# Patient Record
Sex: MALE | Race: WHITE | Employment: OTHER | URBAN - METROPOLITAN AREA
[De-identification: names, ages, dates, MRNs, and addresses within clinical notes are randomized per-mention and may not be internally consistent; named-entity substitution may affect disease eponyms.]

---

## 2019-03-14 ENCOUNTER — HOSPITAL ENCOUNTER (OUTPATIENT)
Age: 74
Discharge: HOME | End: 2019-03-16
Payer: MEDICARE

## 2019-03-14 PROCEDURE — 87088 URINE BACTERIA CULTURE: CPT

## 2019-03-16 LAB — URINE CULTURE, ROUTINE: NORMAL

## 2019-11-05 ENCOUNTER — HOSPITAL ENCOUNTER (OUTPATIENT)
Age: 74
Discharge: HOME | End: 2019-11-07
Payer: MEDICARE

## 2019-11-05 LAB — PROSTATE SPECIFIC ANTIGEN: 1.42 NG/ML (ref 0–4)

## 2019-11-05 PROCEDURE — 84153 ASSAY OF PSA TOTAL: CPT

## 2022-03-25 ENCOUNTER — APPOINTMENT (OUTPATIENT)
Dept: ULTRASOUND IMAGING | Age: 77
End: 2022-03-25
Payer: MEDICARE

## 2022-03-25 ENCOUNTER — APPOINTMENT (OUTPATIENT)
Dept: GENERAL RADIOLOGY | Age: 77
End: 2022-03-25
Payer: MEDICARE

## 2022-03-25 ENCOUNTER — HOSPITAL ENCOUNTER (EMERGENCY)
Age: 77
Discharge: HOME | End: 2022-03-25
Attending: EMERGENCY MEDICINE
Payer: MEDICARE

## 2022-03-25 VITALS
BODY MASS INDEX: 31.83 KG/M2 | TEMPERATURE: 98.3 F | OXYGEN SATURATION: 95 % | SYSTOLIC BLOOD PRESSURE: 104 MMHG | WEIGHT: 235 LBS | DIASTOLIC BLOOD PRESSURE: 84 MMHG | RESPIRATION RATE: 14 BRPM | HEART RATE: 102 BPM | HEIGHT: 72 IN

## 2022-03-25 DIAGNOSIS — I48.91 ATRIAL FIBRILLATION WITH RVR (HCC): ICD-10-CM

## 2022-03-25 DIAGNOSIS — I48.91 NEW ONSET ATRIAL FIBRILLATION (HCC): Primary | ICD-10-CM

## 2022-03-25 LAB
ALBUMIN SERPL-MCNC: 4.3 G/DL (ref 3.5–5.2)
ALP BLD-CCNC: 90 U/L (ref 40–129)
ALT SERPL-CCNC: 19 U/L (ref 0–40)
ANION GAP SERPL CALCULATED.3IONS-SCNC: 11 MMOL/L (ref 7–16)
APTT: 27.2 SEC (ref 24.5–35.1)
AST SERPL-CCNC: 19 U/L (ref 0–39)
BACTERIA: NORMAL /HPF
BASOPHILS ABSOLUTE: 0.03 E9/L (ref 0–0.2)
BASOPHILS RELATIVE PERCENT: 0.7 % (ref 0–2)
BILIRUB SERPL-MCNC: 0.6 MG/DL (ref 0–1.2)
BILIRUBIN URINE: NEGATIVE
BLOOD, URINE: NEGATIVE
BUN BLDV-MCNC: 21 MG/DL (ref 6–23)
CALCIUM SERPL-MCNC: 9.2 MG/DL (ref 8.6–10.2)
CHLORIDE BLD-SCNC: 103 MMOL/L (ref 98–107)
CLARITY: CLEAR
CO2: 26 MMOL/L (ref 22–29)
COLOR: YELLOW
CREAT SERPL-MCNC: 1.2 MG/DL (ref 0.7–1.2)
EOSINOPHILS ABSOLUTE: 0.15 E9/L (ref 0.05–0.5)
EOSINOPHILS RELATIVE PERCENT: 3.6 % (ref 0–6)
GFR AFRICAN AMERICAN: >60
GFR NON-AFRICAN AMERICAN: 58 ML/MIN/1.73
GLUCOSE BLD-MCNC: 118 MG/DL (ref 74–99)
GLUCOSE URINE: NEGATIVE MG/DL
HCT VFR BLD CALC: 43.4 % (ref 37–54)
HEMOGLOBIN: 14.4 G/DL (ref 12.5–16.5)
IMMATURE GRANULOCYTES #: 0.01 E9/L
IMMATURE GRANULOCYTES %: 0.2 % (ref 0–5)
INR BLD: 1
KETONES, URINE: NEGATIVE MG/DL
LEUKOCYTE ESTERASE, URINE: NEGATIVE
LYMPHOCYTES ABSOLUTE: 1.26 E9/L (ref 1.5–4)
LYMPHOCYTES RELATIVE PERCENT: 30.4 % (ref 20–42)
MCH RBC QN AUTO: 31 PG (ref 26–35)
MCHC RBC AUTO-ENTMCNC: 33.2 % (ref 32–34.5)
MCV RBC AUTO: 93.5 FL (ref 80–99.9)
MONOCYTES ABSOLUTE: 0.34 E9/L (ref 0.1–0.95)
MONOCYTES RELATIVE PERCENT: 8.2 % (ref 2–12)
NEUTROPHILS ABSOLUTE: 2.35 E9/L (ref 1.8–7.3)
NEUTROPHILS RELATIVE PERCENT: 56.9 % (ref 43–80)
NITRITE, URINE: NEGATIVE
PDW BLD-RTO: 14.2 FL (ref 11.5–15)
PH UA: 6 (ref 5–9)
PLATELET # BLD: 237 E9/L (ref 130–450)
PMV BLD AUTO: 9.4 FL (ref 7–12)
POTASSIUM REFLEX MAGNESIUM: 3.6 MMOL/L (ref 3.5–5)
PRO-BNP: 2382 PG/ML (ref 0–450)
PROTEIN UA: NEGATIVE MG/DL
PROTHROMBIN TIME: 11.2 SEC (ref 9.3–12.4)
RBC # BLD: 4.64 E12/L (ref 3.8–5.8)
RBC UA: NORMAL /HPF (ref 0–2)
SODIUM BLD-SCNC: 140 MMOL/L (ref 132–146)
SPECIFIC GRAVITY UA: 1.01 (ref 1–1.03)
TOTAL PROTEIN: 7.1 G/DL (ref 6.4–8.3)
TROPONIN, HIGH SENSITIVITY: 22 NG/L (ref 0–11)
TROPONIN, HIGH SENSITIVITY: 26 NG/L (ref 0–11)
UROBILINOGEN, URINE: 0.2 E.U./DL
WBC # BLD: 4.1 E9/L (ref 4.5–11.5)
WBC UA: NORMAL /HPF (ref 0–5)

## 2022-03-25 PROCEDURE — 99285 EMERGENCY DEPT VISIT HI MDM: CPT

## 2022-03-25 PROCEDURE — 85025 COMPLETE CBC W/AUTO DIFF WBC: CPT

## 2022-03-25 PROCEDURE — 81001 URINALYSIS AUTO W/SCOPE: CPT

## 2022-03-25 PROCEDURE — 71045 X-RAY EXAM CHEST 1 VIEW: CPT

## 2022-03-25 PROCEDURE — 85610 PROTHROMBIN TIME: CPT

## 2022-03-25 PROCEDURE — 80053 COMPREHEN METABOLIC PANEL: CPT

## 2022-03-25 PROCEDURE — 85730 THROMBOPLASTIN TIME PARTIAL: CPT

## 2022-03-25 PROCEDURE — 6370000000 HC RX 637 (ALT 250 FOR IP): Performed by: STUDENT IN AN ORGANIZED HEALTH CARE EDUCATION/TRAINING PROGRAM

## 2022-03-25 PROCEDURE — 84484 ASSAY OF TROPONIN QUANT: CPT

## 2022-03-25 PROCEDURE — 93005 ELECTROCARDIOGRAM TRACING: CPT | Performed by: STUDENT IN AN ORGANIZED HEALTH CARE EDUCATION/TRAINING PROGRAM

## 2022-03-25 PROCEDURE — 83880 ASSAY OF NATRIURETIC PEPTIDE: CPT

## 2022-03-25 PROCEDURE — 93970 EXTREMITY STUDY: CPT

## 2022-03-25 PROCEDURE — 36415 COLL VENOUS BLD VENIPUNCTURE: CPT

## 2022-03-25 RX ORDER — ACETAMINOPHEN 500 MG
500 TABLET ORAL NIGHTLY
COMMUNITY
End: 2022-04-01

## 2022-03-25 RX ORDER — METOPROLOL SUCCINATE 25 MG/1
25 TABLET, EXTENDED RELEASE ORAL EVERY MORNING
COMMUNITY
End: 2022-03-25

## 2022-03-25 RX ORDER — LORAZEPAM 1 MG/1
.5-1 TABLET ORAL EVERY 12 HOURS PRN
COMMUNITY

## 2022-03-25 RX ORDER — KETOCONAZOLE 20 MG/G
CREAM TOPICAL NIGHTLY
COMMUNITY
End: 2022-04-01

## 2022-03-25 RX ORDER — METOPROLOL SUCCINATE 25 MG/1
50 TABLET, EXTENDED RELEASE ORAL EVERY MORNING
Qty: 14 TABLET | Refills: 1 | Status: SHIPPED | OUTPATIENT
Start: 2022-03-25 | End: 2022-05-06 | Stop reason: DRUGHIGH

## 2022-03-25 RX ORDER — HYDROCHLOROTHIAZIDE 12.5 MG/1
12.5 CAPSULE, GELATIN COATED ORAL EVERY MORNING
COMMUNITY
End: 2022-04-01 | Stop reason: ALTCHOICE

## 2022-03-25 RX ADMIN — METOPROLOL TARTRATE 25 MG: 25 TABLET, FILM COATED ORAL at 11:50

## 2022-03-25 RX ADMIN — APIXABAN 5 MG: 5 TABLET, FILM COATED ORAL at 09:25

## 2022-03-25 ASSESSMENT — ENCOUNTER SYMPTOMS
COUGH: 0
SORE THROAT: 0
BACK PAIN: 0
SHORTNESS OF BREATH: 1
NAUSEA: 0
ABDOMINAL PAIN: 0
DIARRHEA: 0
VOMITING: 0
RHINORRHEA: 0

## 2022-03-25 NOTE — ED PROVIDER NOTES
[address]  Department of Emergency Medicine     Written by: Cj Donis DO  Patient Name: Garry Myers  Attending Provider: Catherine Cordon DO  Admit Date: 3/25/2022  8:09 AM  MRN: [id number]    : 1945        Chief Complaint   Patient presents with    Irregular Heart Beat     pt states that he has a rapid irregular HR that began last night.     - Chief complaint    HPI   Garry Myers is a 64 y.o. male presenting to the ED for evaluation of Irregular Heart Beat (pt states that he has a rapid irregular HR that began last night. )      Patient has a past medical history of HTN, renal cell carcinoma s/p right-sided nephrectomy in  and is in remission, anxiety for which he takes occasional as-needed ativan. Patient is presenting for evaluation of irregular heart rate; associated symptoms have included restlessness at night and what seems to be described as orthopnea (patient states that he has been having to sleep with few extra pillows at night). His wife presents with him at bedside; states this evening she felt his pulse and it seemed very strong and fast so she looked at his apple watch and his EKG tracing was irregular and abnormal appearing to her so they came in. Patient denies any history of abnormal heart rhythm. He takes 1 baby aspirin daily, no other blood thinners. Patient also reports bilateral lower extremity swelling; states that he has had symptoms like the orthopnea and swelling in his legs on and off over the past year; states that he does not follow with a cardiologist.  His last stress test in heart catheterization was in ; denies any history of stents or ACS. He last saw his PCP in January for the symptoms and states he had an EKG done at that time and was not told that he had any sort of regular rhythm or abnormalities. Patient's complaints are moderate in severity, and intermittent and have been ongoing for the past 3 days.   No specific aggravating or alleviating factors. Denies any chest pain, palpitations, or sensation of heart racing. Denies any history of DVT or PE. Denies any lower extremity tenderness. Denies any fevers, neck pain or stiffness, cough or sore throat, abdominal pain, nausea or vomiting, diarrhea, black or bloody stools, urinary symptoms, numbness or tingling anywhere. Denies any exertional symptoms. Review of Systems   Constitutional: Negative for chills and fever. HENT: Negative for rhinorrhea and sore throat. Eyes: Negative for visual disturbance. Respiratory: Positive for shortness of breath (while laying flat at night). Negative for cough. Cardiovascular: Positive for leg swelling. Negative for chest pain and palpitations. Gastrointestinal: Negative for abdominal pain, diarrhea, nausea and vomiting. Genitourinary: Negative for dysuria and frequency. Musculoskeletal: Negative for back pain and myalgias. Skin: Negative for rash and wound. Neurological: Negative for weakness and headaches. Psychiatric/Behavioral: Negative for confusion. All other systems reviewed and are negative. Physical Exam  Vitals and nursing note reviewed. Constitutional:       General: He is not in acute distress. Appearance: He is not toxic-appearing. HENT:      Head: Normocephalic and atraumatic. Right Ear: External ear normal.      Left Ear: External ear normal.      Nose: Nose normal. No rhinorrhea. Mouth/Throat:      Mouth: Mucous membranes are moist.      Pharynx: Oropharynx is clear. Eyes:      Extraocular Movements: Extraocular movements intact. Conjunctiva/sclera: Conjunctivae normal.      Pupils: Pupils are equal, round, and reactive to light. Cardiovascular:      Rate and Rhythm: Tachycardia present. Rhythm irregular. Pulses: Normal pulses. Heart sounds: Normal heart sounds. No murmur heard.       Pulmonary:      Effort: Pulmonary effort is normal. No respiratory distress. Breath sounds: Normal breath sounds. No wheezing or rales. Abdominal:      General: Bowel sounds are normal.      Palpations: Abdomen is soft. Tenderness: There is no abdominal tenderness. There is no guarding or rebound. Musculoskeletal:         General: No tenderness. Normal range of motion. Cervical back: Normal range of motion and neck supple. Right lower leg: No edema. Left lower leg: No edema. Skin:     General: Skin is warm and dry. Capillary Refill: Capillary refill takes less than 2 seconds. Coloration: Skin is not jaundiced or pale. Findings: No rash. Neurological:      General: No focal deficit present. Mental Status: He is alert and oriented to person, place, and time. Sensory: No sensory deficit. Motor: No weakness. Psychiatric:         Mood and Affect: Mood normal.         Behavior: Behavior normal.          Procedures       [facility]     ED Course as of 03/27/22 2036   Fri Mar 25, 2022   1036 US DUP LOWER EXTREMITIES BILATERAL VENOUS  IMPRESSION:  1. No sonographic evidence of deep venous thrombosis in either lower  extremity. 2. Left popliteal cyst. [VG]   0674 Spoke with Dr. Juan Steve on behalf of Dr. Monk , discussed case, states patient can follow up in the office Monday and recommend d/c on eliquis and metoprolol. [VG]      ED Course User Index  [VG] Javid Zamorano DO       MDM    This is a 64 y.o. male presenting for evaluation of irregular heart rate; associated symptoms have included restlessness at night and what seems to be described as orthopnea (patient states that he has been having to sleep with few extra pillows at night). Please see HPI for further details. No acute distress, chest pain or sensation of palpitations. Vitals are notable for heart rate in the 120s130s and irregular; EKG shows atrial fibrillation. Patient takes 25 mg XR metoprolol every morning he states for hypertension.   States he did take abnormality in lead I and aVL; when compared to previous EKG from 2014, atrial fibrillation has now replaced sinus rhythm. I have discussed this patient with my attending, who has seen the patient and agrees with this disposition. Patient was seen and evaluated by myself and my attending Kalie Nagel DO. Assessment and Plan discussed with attending provider, please see attestation for final plan of care.       --------------------------------------------- PAST HISTORY ---------------------------------------------  Past Medical History:  has a past medical history of Arthritis, Environmental allergies, Hypertension, and Renal cell carcinoma ([facility] [address] Past Surgical History:  has a past surgical history that includes Diagnostic Cardiac Cath Lab Procedure; hernia repair; Knee cartilage surgery (years ago); Pilonidal cyst excision; joint replacement; Colonoscopy; Testicle removal (1995); and total nephrectomy (7/22/14). Social History:  reports that he has quit smoking. He has never used smokeless tobacco. He reports that he does not drink alcohol and does not use drugs. Family History: family history includes Heart Failure in his father. The patients home medications have been reviewed. Allergies: Patient has no known allergies.     -------------------------------------------------- RESULTS -------------------------------------------------  Labs:  Results for orders placed or performed during the hospital encounter of 03/25/22   CBC with Auto Differential   Result Value Ref Range    WBC 4.1 (L) 4.5 - 11.5 E9/L    RBC 4.64 3.80 - 5.80 E12/L    Hemoglobin 14.4 12.5 - 16.5 g/dL    Hematocrit 43.4 37.0 - 54.0 %    MCV 93.5 80.0 - 99.9 fL    MCH 31.0 26.0 - 35.0 pg    MCHC 33.2 32.0 - 34.5 %    RDW 14.2 11.5 - 15.0 fL    Platelets 559 787 - 070 E9/L    MPV 9.4 7.0 - 12.0 fL    Neutrophils % 56.9 43.0 - 80.0 %    Immature Granulocytes % 0.2 0.0 - 5.0 %    Lymphocytes % 30.4 20.0 - 42.0 %    Monocytes % 8.2 2.0 - 12.0 %    Eosinophils % 3.6 0.0 - 6.0 %    Basophils % 0.7 0.0 - 2.0 %    Neutrophils Absolute 2.35 1.80 - 7.30 E9/L    Immature Granulocytes # 0.01 E9/L    Lymphocytes Absolute 1.26 (L) 1.50 - 4.00 E9/L    Monocytes Absolute 0.34 0.10 - 0.95 E9/L    Eosinophils Absolute 0.15 0.05 - 0.50 E9/L    Basophils Absolute 0.03 0.00 - 0.20 E9/L   Comprehensive Metabolic Panel w/ Reflex to MG   Result Value Ref Range    Sodium 140 132 - 146 mmol/L    Potassium reflex Magnesium 3.6 3.5 - 5.0 mmol/L    Chloride 103 98 - 107 mmol/L    CO2 26 22 - 29 mmol/L    Anion Gap 11 7 - 16 mmol/L    Glucose 118 (H) 74 - 99 mg/dL    BUN 21 6 - 23 mg/dL    CREATININE 1.2 0.7 - 1.2 mg/dL    GFR Non-African American 58 >=60 mL/min/1.73    GFR African American >60     Calcium 9.2 8.6 - 10.2 mg/dL    Total Protein 7.1 6.4 - 8.3 g/dL    Albumin 4.3 3.5 - 5.2 g/dL    Total Bilirubin 0.6 0.0 - 1.2 mg/dL    Alkaline Phosphatase 90 40 - 129 U/L    ALT 19 0 - 40 U/L    AST 19 0 - 39 U/L   Troponin   Result Value Ref Range    Troponin, High Sensitivity 26 (H) 0 - 11 ng/L   Brain Natriuretic Peptide   Result Value Ref Range    Pro-BNP 2,382 (H) 0 - 450 pg/mL   Protime-INR   Result Value Ref Range    Protime 11.2 9.3 - 12.4 sec    INR 1.0    APTT   Result Value Ref Range    aPTT 27.2 24.5 - 35.1 sec   Urinalysis with Microscopic   Result Value Ref Range    Color, UA Yellow Straw/Yellow    Clarity, UA Clear Clear    Glucose, Ur Negative Negative mg/dL    Bilirubin Urine Negative Negative    Ketones, Urine Negative Negative mg/dL    Specific Gravity, UA 1.015 1.005 - 1.030    Blood, Urine Negative Negative    pH, UA 6.0 5.0 - 9.0    Protein, UA Negative Negative mg/dL    Urobilinogen, Urine 0.2 <2.0 E.U./dL    Nitrite, Urine Negative Negative    Leukocyte Esterase, Urine Negative Negative    WBC, UA NONE 0 - 5 /HPF    RBC, UA NONE 0 - 2 /HPF    Bacteria, UA NONE SEEN None Seen /HPF   Troponin   Result Value Ref Range    Troponin, High Sensitivity 22 (H) 0 - 11 ng/L   EKG 12 Lead   Result Value Ref Range    Ventricular Rate 127 BPM    Atrial Rate 83 BPM    QRS Duration 108 ms    Q-T Interval 342 ms    QTc Calculation (Bazett) 497 ms    R Axis -41 degrees    T Axis 90 degrees       Radiology:  US DUP LOWER EXTREMITIES BILATERAL VENOUS   Final Result   1. No sonographic evidence of deep venous thrombosis in either lower   extremity. 2. Left popliteal cyst.         XR CHEST PORTABLE   Final Result   Mild to moderate pulmonary vascular congestion.               ------------------------- NURSING NOTES AND VITALS REVIEWED ---------------------------  Date / Time Roomed:  3/25/2022  8:09 AM  ED Bed Assignment:  CAROL/CARLO    The nursing notes within the ED encounter and vital signs as below have been reviewed. /84   Pulse 102   Temp 98.3 °F (36.8 °C) (Oral)   Resp 14   Ht 6' (1.829 m)   Wt 235 lb (106.6 kg)   SpO2 95%   BMI 31.87 kg/m²   Oxygen Saturation Interpretation: Normal      ------------------------------------------ PROGRESS NOTES ------------------------------------------  8:57 PM EDT  I have spoken with the patient and discussed todays results, in addition to providing specific details for the plan of care and counseling regarding the diagnosis and prognosis. Their questions are answered at this time and they are agreeable with the plan. I discussed at length with them reasons for immediate return here for re evaluation. They will followup with their EP referral and primary care physician by calling their office on Monday.      --------------------------------- ADDITIONAL PROVIDER NOTES ---------------------------------  At this time the patient is without objective evidence of an acute process requiring hospitalization or inpatient management. They have remained hemodynamically stable throughout their entire ED visit and are stable for discharge with outpatient follow-up.      The plan has been discussed in detail and they are aware of the specific conditions for emergent return, as well as the importance of follow-up. Discharge Medication List as of 3/25/2022 12:07 PM      START taking these medications    Details   apixaban (ELIQUIS) 5 MG TABS tablet Take 1 tablet by mouth 2 times daily for 7 days, Disp-14 tablet, R-0Print             Diagnosis:  1. New onset atrial fibrillation ([facility] [address]    2. Atrial fibrillation with RVR ([facility] [address]        Disposition:  Patient's disposition: Discharge to home  Patient's condition is stable.        Rigo Presley, DO  Resident  03/27/22 0134

## 2022-03-25 NOTE — ED NOTES
Patient ambulated throughout the hallway without assistance. Patient tolerated well.      Evelyn Lanier, KRYSTINA  03/25/22 4409

## 2022-03-26 LAB
EKG ATRIAL RATE: 83 BPM
EKG Q-T INTERVAL: 342 MS
EKG QRS DURATION: 108 MS
EKG QTC CALCULATION (BAZETT): 497 MS
EKG R AXIS: -41 DEGREES
EKG T AXIS: 90 DEGREES
EKG VENTRICULAR RATE: 127 BPM

## 2022-03-26 PROCEDURE — 93010 ELECTROCARDIOGRAM REPORT: CPT | Performed by: INTERNAL MEDICINE

## 2022-04-01 ENCOUNTER — OFFICE VISIT (OUTPATIENT)
Dept: CARDIOLOGY CLINIC | Age: 77
End: 2022-04-01
Payer: MEDICARE

## 2022-04-01 VITALS
HEART RATE: 105 BPM | BODY MASS INDEX: 32.23 KG/M2 | DIASTOLIC BLOOD PRESSURE: 82 MMHG | WEIGHT: 238 LBS | RESPIRATION RATE: 12 BRPM | SYSTOLIC BLOOD PRESSURE: 108 MMHG | HEIGHT: 72 IN

## 2022-04-01 DIAGNOSIS — I10 HTN (HYPERTENSION), BENIGN: Primary | ICD-10-CM

## 2022-04-01 DIAGNOSIS — G47.33 OSA (OBSTRUCTIVE SLEEP APNEA): ICD-10-CM

## 2022-04-01 PROCEDURE — 93000 ELECTROCARDIOGRAM COMPLETE: CPT | Performed by: INTERNAL MEDICINE

## 2022-04-01 PROCEDURE — 99205 OFFICE O/P NEW HI 60 MIN: CPT | Performed by: INTERNAL MEDICINE

## 2022-04-01 RX ORDER — POTASSIUM CHLORIDE 20 MEQ/1
20 TABLET, EXTENDED RELEASE ORAL DAILY
Qty: 30 TABLET | Refills: 3 | Status: SHIPPED
Start: 2022-04-01 | End: 2022-04-06

## 2022-04-01 RX ORDER — SERTRALINE HYDROCHLORIDE 25 MG/1
25 TABLET, FILM COATED ORAL EVERY MORNING
COMMUNITY
Start: 2022-03-29

## 2022-04-01 RX ORDER — FUROSEMIDE 40 MG/1
40 TABLET ORAL DAILY
Qty: 30 TABLET | Refills: 3 | Status: SHIPPED
Start: 2022-04-01 | End: 2022-04-06

## 2022-04-01 NOTE — PROGRESS NOTES
OUTPATIENT CARDIOLOGY CONSULT    Name: Shaji Aviles    Age: 64 y.o. Date of Service: 4/1/2022    Reason for Consultation: New onset atrial fibrillation    Referring Physician: Radha Rosenbaum MD    History of Present Illness:  Shaji Aviles is a 64 y.o. male who presents today for further evaluation of new onset A. fib. No prior cardiac issues. He is active older gentleman who works out at [employer] most days per week for several hours. However over the past few weeks has noted decreased exercise tolerance and palpitations. Saw his PCP but was apparently in normal rhythm at the time. Palpitations got worse, also noted orthopnea requiring extra pillows. Went to the ER and found to be in atrial fibrillation with RVR in 130s. He was put on apixaban and metoprolol dose was increased and he was sent home. proBNP was elevated in the 2000s and x-ray showed evidence of pulmonary vascular congestion. He denies ever having chest pain. Since then he is feeling less palpitations but still short of breath with exertion and not able to exercise as much as he normally does. Notes mild lower extremity edema which he thinks is related to amlodipine. Wife states he has episodes of apnea while sleeping but does not snore. Does not drink alcohol. Has history of renal cell carcinoma and right nephrectomy. Review of Systems:  Complete review of systems otherwise negative except as described above.     Past Medical History:  Past Medical History:   Diagnosis Date    Arthritis     Environmental allergies     grass mold trees    Hypertension     Renal cell carcinoma [facility]) July 2014    s/p right nephrectomy       Past Surgical History:  Past Surgical History:   Procedure Laterality Date    COLONOSCOPY      DIAGNOSTIC CARDIAC CATH LAB PROCEDURE      HERNIA REPAIR      JOINT REPLACEMENT      beto hips    KNEE CARTILAGE SURGERY  years ago    right    PILONIDAL CYST EXCISION      TESTICLE REMOVAL  1995    TOTAL NEPHRECTOMY  7/22/14    right radical nephrectomy laparoscopic hand assisted with cystoscopy right ureteral catheter insertion        Family History:  Family History   Problem Relation Age of Onset    Heart Failure Father        Social History:  Social History     Tobacco Use    Smoking status: Former Smoker    Smokeless tobacco: Never Used    Tobacco comment: quit in 1971   Substance Use Topics    Alcohol use: No    Drug use: No        Allergies:  No Known Allergies    Current Medications:    Current Outpatient Medications:     sertraline (ZOLOFT) 25 MG tablet, TAKE ONE TABLET BY MOUTH EVERY DAY, Disp: , Rfl:     furosemide (LASIX) 40 MG tablet, Take 1 tablet by mouth daily, Disp: 30 tablet, Rfl: 3    potassium chloride (KLOR-CON M) 20 MEQ extended release tablet, Take 1 tablet by mouth daily, Disp: 30 tablet, Rfl: 3    LORazepam (ATIVAN) 1 MG tablet, Take 1 mg by mouth every 12 hours as needed for Anxiety. , Disp: , Rfl:     APPLE CIDER VINEGAR PO, Take 1 tablet by mouth every morning, Disp: , Rfl:     apixaban (ELIQUIS) 5 MG TABS tablet, Take 1 tablet by mouth 2 times daily for 7 days, Disp: 14 tablet, Rfl: 0    metoprolol succinate (TOPROL XL) 25 MG extended release tablet, Take 2 tablets by mouth every morning, Disp: 14 tablet, Rfl: 1    doxazosin (CARDURA) 8 MG tablet, Take 4 mg by mouth 2 times daily , Disp: , Rfl:     meloxicam (MOBIC) 15 MG tablet, Take 7.5 mg by mouth 2 times daily , Disp: , Rfl:     amLODIPine (NORVASC) 10 MG tablet, Take 10 mg by mouth nightly , Disp: , Rfl:     Physical Exam:  /82   Pulse 105   Resp 12   Ht 6' (1.829 m)   Wt 238 lb (108 kg)   BMI 32.28 kg/m²   Wt Readings from Last 3 Encounters:   04/01/22 238 lb (108 kg)   03/25/22 235 lb (106.6 kg)   07/15/14 222 lb (100.7 kg)     Appearance: Awake, alert, no acute respiratory distress  Skin: Intact, no rash  Eyes: EOMI, no conjunctival erythema  ENMT: Moist mucous membranes.     Neck: Supple, no elevated JVP, no carotid bruits  Lungs: Scant basilar rales  Cardiac: Irregular rhythm with a borderline tachycardic rate. S1 & S2 normal, no murmurs  Abdomen: Soft, nontender, +bowel sounds  Extremities: Moves all extremities x 4, 1+ pitting bilateral lower extremity edema  Neurologic: No focal motor deficits apparent, normal mood and affect  Peripheral Pulses: Intact posterior tibial pulses bilaterally    Laboratory Tests:  Lab Results   Component Value Date    CREATININE 1.2 03/25/2022    BUN 21 03/25/2022     03/25/2022    K 3.6 03/25/2022     03/25/2022    CO2 26 03/25/2022     No results found for: MG  Lab Results   Component Value Date    WBC 4.1 (L) 03/25/2022    HGB 14.4 03/25/2022    HCT 43.4 03/25/2022    MCV 93.5 03/25/2022     03/25/2022     Lab Results   Component Value Date    ALT 19 03/25/2022    AST 19 03/25/2022    ALKPHOS 90 03/25/2022    BILITOT 0.6 03/25/2022     Lab Results   Component Value Date    CKTOTAL 74 08/01/2014    CKMB 1.9 08/01/2014    TROPONINI <0.01 08/01/2014     Lab Results   Component Value Date    INR 1.0 03/25/2022    INR 1.2 08/01/2014    PROTIME 11.2 03/25/2022    PROTIME 12.9 (H) 08/01/2014     No results found for: TSHFT4, TSH  No results found for: LABA1C  No results found for: EAG  No results found for: CHOL  No results found for: TRIG  No results found for: HDL  No results found for: LDLCALC, LDLCHOLESTEROL  No results found for: LABVLDL, VLDL  No results found for: CHOLHDLRATIO  No results for input(s): PROBNP in the last 72 hours. Pro-BNP (pg/mL)   Date Value   03/25/2022 2,382 (H)      Troponin, High Sensitivity (ng/L)   Date Value   03/25/2022 22 (H)   03/25/2022 26 (H)     Cardiac Tests:  ECG:   Atrial fibrillation 105 bpm.  Normal axis and intervals. IVCD QRS duration 120 ms. Nonspecific ST changes    Chest X-ray:   3/25/22    Impression   Mild to moderate pulmonary vascular congestion.      Echocardiogram:    Stress test:      Cardiac catheterization:  Cath in [address] ([address]) \"minimal plaque\" in LAD   EF 55%    Orders Placed This Encounter   Procedures    Basic Metabolic Panel    Brain Natriuretic Peptide    EKG 12 lead    Baseline diagnostic sleep study        Requested Prescriptions     Signed Prescriptions Disp Refills    furosemide (LASIX) 40 MG tablet 30 tablet 3     Sig: Take 1 tablet by mouth daily    potassium chloride (KLOR-CON M) 20 MEQ extended release tablet 30 tablet 3     Sig: Take 1 tablet by mouth daily        ASSESSMENT / PLAN:  1. Newly diagnosed atrial fibrillation. [id number] at least 4-5  2. Mild acute heart failure, no ejection fraction. proBNP 2400 mildly hypervolemic  3. Hypertension  4. 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 History of renal cell cancer s/p right nephrectomy  6. Suspected sleep apnea    Recommendations:  Definitely symptomatic with likely very recent onset of atrial fibrillation and evidence of CHF. · Discontinue hydrochlorothiazide, start furosemide 40 mg daily with potassium supplementation  · Continue apixaban for stroke risk reduction  · Continue current cardiac medications otherwise; decrease amlodipine and/or doxazosin if blood pressure remains low  · Schedule MERT cardioversion with me  · Depending on EF, adjust GDMT as dictated  · Depending on EF, ischemic evaluation with heart catheterization versus stress test  · Notify me if breathing gets worse, or severe go back to the ER  · Check blood work in a week  · Recommend sleep study  · Aggressive risk factor modification  · Will plan follow-up after cardioversion    Risks and benefits of MERT cardioversion explained to patient, including risk of esophageal perforation, CVA, failure to restore sinus rhythm, and recurrence of atrial arrhythmias. He understands and agrees to proceed. Greater than 60 minutes spent on this encounter    Thank you for allowing me to participate in your patient's care.  Please feel free to contact me if you have any questions or concerns.     Autumn Daniel MD, [address] [facility]

## 2022-04-06 ENCOUNTER — HOSPITAL ENCOUNTER (INPATIENT)
Age: 77
LOS: 2 days | Discharge: HOME | DRG: 308 | End: 2022-04-08
Attending: EMERGENCY MEDICINE | Admitting: INTERNAL MEDICINE
Payer: MEDICARE

## 2022-04-06 ENCOUNTER — APPOINTMENT (OUTPATIENT)
Dept: GENERAL RADIOLOGY | Age: 77
DRG: 308 | End: 2022-04-06
Payer: MEDICARE

## 2022-04-06 DIAGNOSIS — I48.91 ATRIAL FIBRILLATION WITH RAPID VENTRICULAR RESPONSE (HCC): Primary | ICD-10-CM

## 2022-04-06 LAB
ALBUMIN SERPL-MCNC: 4.3 G/DL (ref 3.5–5.2)
ALP BLD-CCNC: 95 U/L (ref 40–129)
ALT SERPL-CCNC: 49 U/L (ref 0–40)
ANION GAP SERPL CALCULATED.3IONS-SCNC: 11 MMOL/L (ref 7–16)
AST SERPL-CCNC: 24 U/L (ref 0–39)
BASOPHILS ABSOLUTE: 0.02 E9/L (ref 0–0.2)
BASOPHILS RELATIVE PERCENT: 0.5 % (ref 0–2)
BILIRUB SERPL-MCNC: 1 MG/DL (ref 0–1.2)
BUN BLDV-MCNC: 13 MG/DL (ref 6–23)
CALCIUM SERPL-MCNC: 9.3 MG/DL (ref 8.6–10.2)
CHLORIDE BLD-SCNC: 102 MMOL/L (ref 98–107)
CO2: 24 MMOL/L (ref 22–29)
CREAT SERPL-MCNC: 1.1 MG/DL (ref 0.7–1.2)
EOSINOPHILS ABSOLUTE: 0.05 E9/L (ref 0.05–0.5)
EOSINOPHILS RELATIVE PERCENT: 1.2 % (ref 0–6)
GFR AFRICAN AMERICAN: >60
GFR NON-AFRICAN AMERICAN: >60 ML/MIN/1.73
GLUCOSE BLD-MCNC: 127 MG/DL (ref 74–99)
HCT VFR BLD CALC: 40.4 % (ref 37–54)
HEMOGLOBIN: 13.2 G/DL (ref 12.5–16.5)
IMMATURE GRANULOCYTES #: 0 E9/L
IMMATURE GRANULOCYTES %: 0 % (ref 0–5)
LYMPHOCYTES ABSOLUTE: 0.73 E9/L (ref 1.5–4)
LYMPHOCYTES RELATIVE PERCENT: 17.7 % (ref 20–42)
MCH RBC QN AUTO: 30.1 PG (ref 26–35)
MCHC RBC AUTO-ENTMCNC: 32.7 % (ref 32–34.5)
MCV RBC AUTO: 92 FL (ref 80–99.9)
MONOCYTES ABSOLUTE: 0.37 E9/L (ref 0.1–0.95)
MONOCYTES RELATIVE PERCENT: 9 % (ref 2–12)
NEUTROPHILS ABSOLUTE: 2.95 E9/L (ref 1.8–7.3)
NEUTROPHILS RELATIVE PERCENT: 71.6 % (ref 43–80)
PDW BLD-RTO: 13.9 FL (ref 11.5–15)
PLATELET # BLD: 207 E9/L (ref 130–450)
PMV BLD AUTO: 10.1 FL (ref 7–12)
POTASSIUM REFLEX MAGNESIUM: 3.8 MMOL/L (ref 3.5–5)
PRO-BNP: 2358 PG/ML (ref 0–450)
RBC # BLD: 4.39 E12/L (ref 3.8–5.8)
SARS-COV-2, NAAT: NOT DETECTED
SODIUM BLD-SCNC: 137 MMOL/L (ref 132–146)
TOTAL PROTEIN: 7.1 G/DL (ref 6.4–8.3)
TROPONIN, HIGH SENSITIVITY: 21 NG/L (ref 0–11)
TROPONIN, HIGH SENSITIVITY: 23 NG/L (ref 0–11)
TSH SERPL DL<=0.05 MIU/L-ACNC: 1.35 UIU/ML (ref 0.27–4.2)
WBC # BLD: 4.1 E9/L (ref 4.5–11.5)

## 2022-04-06 PROCEDURE — 85025 COMPLETE CBC W/AUTO DIFF WBC: CPT

## 2022-04-06 PROCEDURE — 71045 X-RAY EXAM CHEST 1 VIEW: CPT

## 2022-04-06 PROCEDURE — 99223 1ST HOSP IP/OBS HIGH 75: CPT | Performed by: INTERNAL MEDICINE

## 2022-04-06 PROCEDURE — 96361 HYDRATE IV INFUSION ADD-ON: CPT

## 2022-04-06 PROCEDURE — 36415 COLL VENOUS BLD VENIPUNCTURE: CPT

## 2022-04-06 PROCEDURE — 6360000002 HC RX W HCPCS: Performed by: INTERNAL MEDICINE

## 2022-04-06 PROCEDURE — 99284 EMERGENCY DEPT VISIT MOD MDM: CPT

## 2022-04-06 PROCEDURE — 93005 ELECTROCARDIOGRAM TRACING: CPT | Performed by: STUDENT IN AN ORGANIZED HEALTH CARE EDUCATION/TRAINING PROGRAM

## 2022-04-06 PROCEDURE — 84443 ASSAY THYROID STIM HORMONE: CPT

## 2022-04-06 PROCEDURE — 87635 SARS-COV-2 COVID-19 AMP PRB: CPT

## 2022-04-06 PROCEDURE — 6370000000 HC RX 637 (ALT 250 FOR IP): Performed by: INTERNAL MEDICINE

## 2022-04-06 PROCEDURE — 84484 ASSAY OF TROPONIN QUANT: CPT

## 2022-04-06 PROCEDURE — 96360 HYDRATION IV INFUSION INIT: CPT

## 2022-04-06 PROCEDURE — 80053 COMPREHEN METABOLIC PANEL: CPT

## 2022-04-06 PROCEDURE — 83880 ASSAY OF NATRIURETIC PEPTIDE: CPT

## 2022-04-06 PROCEDURE — 2580000003 HC RX 258: Performed by: INTERNAL MEDICINE

## 2022-04-06 PROCEDURE — 2580000003 HC RX 258: Performed by: EMERGENCY MEDICINE

## 2022-04-06 PROCEDURE — APPSS60 APP SPLIT SHARED TIME 46-60 MINUTES: Performed by: NURSE PRACTITIONER

## 2022-04-06 PROCEDURE — 2060000000 HC ICU INTERMEDIATE R&B

## 2022-04-06 PROCEDURE — 6370000000 HC RX 637 (ALT 250 FOR IP): Performed by: EMERGENCY MEDICINE

## 2022-04-06 RX ORDER — ONDANSETRON 2 MG/ML
4 INJECTION INTRAMUSCULAR; INTRAVENOUS EVERY 6 HOURS PRN
Status: DISCONTINUED | OUTPATIENT
Start: 2022-04-06 | End: 2022-04-08 | Stop reason: HOSPADM

## 2022-04-06 RX ORDER — POTASSIUM CHLORIDE 20 MEQ/1
20 TABLET, EXTENDED RELEASE ORAL EVERY MORNING
COMMUNITY
End: 2022-04-29 | Stop reason: ALTCHOICE

## 2022-04-06 RX ORDER — DOXAZOSIN MESYLATE 4 MG/1
4 TABLET ORAL 2 TIMES DAILY
Status: DISCONTINUED | OUTPATIENT
Start: 2022-04-06 | End: 2022-04-08

## 2022-04-06 RX ORDER — AMLODIPINE BESYLATE 10 MG/1
10 TABLET ORAL NIGHTLY
Status: DISCONTINUED | OUTPATIENT
Start: 2022-04-06 | End: 2022-04-08

## 2022-04-06 RX ORDER — MAGNESIUM SULFATE IN WATER 40 MG/ML
INJECTION, SOLUTION INTRAVENOUS
Status: DISCONTINUED
Start: 2022-04-06 | End: 2022-04-06 | Stop reason: WASHOUT

## 2022-04-06 RX ORDER — FUROSEMIDE 10 MG/ML
40 INJECTION INTRAMUSCULAR; INTRAVENOUS ONCE
Status: COMPLETED | OUTPATIENT
Start: 2022-04-06 | End: 2022-04-06

## 2022-04-06 RX ORDER — METOPROLOL SUCCINATE 25 MG/1
50 TABLET, EXTENDED RELEASE ORAL DAILY
Status: DISCONTINUED | OUTPATIENT
Start: 2022-04-06 | End: 2022-04-08 | Stop reason: HOSPADM

## 2022-04-06 RX ORDER — 0.9 % SODIUM CHLORIDE 0.9 %
1000 INTRAVENOUS SOLUTION INTRAVENOUS ONCE
Status: COMPLETED | OUTPATIENT
Start: 2022-04-06 | End: 2022-04-06

## 2022-04-06 RX ORDER — POTASSIUM CHLORIDE 20 MEQ/1
20 TABLET, EXTENDED RELEASE ORAL EVERY MORNING
Status: DISCONTINUED | OUTPATIENT
Start: 2022-04-07 | End: 2022-04-08

## 2022-04-06 RX ORDER — SODIUM CHLORIDE 0.9 % (FLUSH) 0.9 %
5-40 SYRINGE (ML) INJECTION EVERY 12 HOURS SCHEDULED
Status: DISCONTINUED | OUTPATIENT
Start: 2022-04-06 | End: 2022-04-08 | Stop reason: HOSPADM

## 2022-04-06 RX ORDER — LORAZEPAM 0.5 MG/1
0.5 TABLET ORAL EVERY 12 HOURS PRN
Status: DISCONTINUED | OUTPATIENT
Start: 2022-04-06 | End: 2022-04-08 | Stop reason: HOSPADM

## 2022-04-06 RX ORDER — ACETAMINOPHEN 325 MG/1
650 TABLET ORAL EVERY 4 HOURS PRN
Status: DISCONTINUED | OUTPATIENT
Start: 2022-04-06 | End: 2022-04-08 | Stop reason: HOSPADM

## 2022-04-06 RX ORDER — POTASSIUM CHLORIDE 20 MEQ/1
40 TABLET, EXTENDED RELEASE ORAL ONCE
Status: COMPLETED | OUTPATIENT
Start: 2022-04-06 | End: 2022-04-06

## 2022-04-06 RX ORDER — FUROSEMIDE 40 MG/1
40 TABLET ORAL EVERY MORNING
COMMUNITY
End: 2022-06-20 | Stop reason: ALTCHOICE

## 2022-04-06 RX ORDER — MAGNESIUM SULFATE HEPTAHYDRATE 500 MG/ML
2000 INJECTION, SOLUTION INTRAMUSCULAR; INTRAVENOUS ONCE
Status: DISCONTINUED | OUTPATIENT
Start: 2022-04-06 | End: 2022-04-06 | Stop reason: ALTCHOICE

## 2022-04-06 RX ORDER — FUROSEMIDE 40 MG/1
40 TABLET ORAL EVERY MORNING
Status: DISCONTINUED | OUTPATIENT
Start: 2022-04-07 | End: 2022-04-08 | Stop reason: HOSPADM

## 2022-04-06 RX ORDER — SODIUM CHLORIDE 9 MG/ML
INJECTION, SOLUTION INTRAVENOUS PRN
Status: DISCONTINUED | OUTPATIENT
Start: 2022-04-06 | End: 2022-04-08 | Stop reason: HOSPADM

## 2022-04-06 RX ORDER — SODIUM CHLORIDE 0.9 % (FLUSH) 0.9 %
5-40 SYRINGE (ML) INJECTION PRN
Status: DISCONTINUED | OUTPATIENT
Start: 2022-04-06 | End: 2022-04-08 | Stop reason: HOSPADM

## 2022-04-06 RX ORDER — ONDANSETRON 4 MG/1
4 TABLET, ORALLY DISINTEGRATING ORAL EVERY 8 HOURS PRN
Status: DISCONTINUED | OUTPATIENT
Start: 2022-04-06 | End: 2022-04-08 | Stop reason: HOSPADM

## 2022-04-06 RX ADMIN — POTASSIUM CHLORIDE 40 MEQ: 20 TABLET, EXTENDED RELEASE ORAL at 18:42

## 2022-04-06 RX ADMIN — METOPROLOL TARTRATE 25 MG: 25 TABLET, FILM COATED ORAL at 12:53

## 2022-04-06 RX ADMIN — Medication 10 ML: at 21:41

## 2022-04-06 RX ADMIN — FUROSEMIDE 40 MG: 10 INJECTION, SOLUTION INTRAMUSCULAR; INTRAVENOUS at 17:07

## 2022-04-06 RX ADMIN — LORAZEPAM 0.5 MG: 0.5 TABLET ORAL at 22:12

## 2022-04-06 RX ADMIN — APIXABAN 5 MG: 5 TABLET, FILM COATED ORAL at 21:40

## 2022-04-06 RX ADMIN — DOXAZOSIN 4 MG: 4 TABLET ORAL at 21:39

## 2022-04-06 RX ADMIN — SODIUM CHLORIDE 1000 ML: 9 INJECTION, SOLUTION INTRAVENOUS at 12:54

## 2022-04-06 RX ADMIN — METOPROLOL SUCCINATE 50 MG: 25 TABLET, EXTENDED RELEASE ORAL at 21:40

## 2022-04-06 ASSESSMENT — ENCOUNTER SYMPTOMS
ABDOMINAL PAIN: 0
VOICE CHANGE: 0
DIARRHEA: 0
VOMITING: 0
SHORTNESS OF BREATH: 1
TROUBLE SWALLOWING: 0
PHOTOPHOBIA: 0
COUGH: 0
NAUSEA: 0

## 2022-04-06 ASSESSMENT — PAIN SCALES - GENERAL
PAINLEVEL_OUTOF10: 0

## 2022-04-06 NOTE — PROGRESS NOTES
Database initiated. Patient A&O from home with wife. States he uses no assistive devices and is RA at baseline. He did leave his hearing aides at home. Pharmacy tech verified home medications.

## 2022-04-06 NOTE — ED PROVIDER NOTES
HPI    Patient is a 77-year-old male with past medical history of hypertension presenting the emergency department for evaluation of tachycardia. Patient has recent diagnosis of A. fib 2 weeks ago. He was seen in the emergency department and given a dose of metoprolol and eventually discharged. Patient follows with Dr. Yassine Anderson for cardiology. He states that he saw him last Friday. Patient is currently on Eliquis and taking as prescribed. Apparently, he was supposed to be cardioverted on 4/19/2022 with outpatient MERT to be done then as well. Today, patient was fatigued and wife checked his vitals at home and noticed a very high heart rate. Patient endorses shortness of breath but denies any chest pain, fever, chills, nausea, vomiting, diaphoresis, lightheadedness, syncope, urinary symptoms, constipation or diarrhea. Appears to be in A. fib RVR on monitor. Patient awake, alert and oriented x3 and hemodynamically stable. EKG and lab work obtained. Review of Systems   Constitutional: Positive for appetite change and fatigue. Negative for chills and fever. Eating and drinking less. Generalized weakness and fatigue. HENT: Negative for congestion, trouble swallowing and voice change. Eyes: Negative for photophobia and visual disturbance. Respiratory: Positive for shortness of breath. Negative for cough. Cardiovascular: Negative for chest pain and palpitations. Gastrointestinal: Negative for abdominal pain, diarrhea, nausea and vomiting. Genitourinary: Negative for dysuria, frequency and hematuria. Musculoskeletal: Negative for arthralgias. Skin: Negative for rash and wound. Neurological: Negative for dizziness, weakness and headaches. Psychiatric/Behavioral: Negative for behavioral problems and confusion. Physical Exam  Constitutional:       General: He is not in acute distress. Appearance: Normal appearance. He is not ill-appearing.    HENT:      Head: Normocephalic and atraumatic. Eyes:      Pupils: Pupils are equal, round, and reactive to light. Cardiovascular:      Rate and Rhythm: Tachycardia present. Rhythm irregular. Pulses: Normal pulses. Heart sounds: Normal heart sounds. Pulmonary:      Effort: Pulmonary effort is normal. No respiratory distress. Breath sounds: Normal breath sounds. No wheezing or rales. Abdominal:      Tenderness: There is no abdominal tenderness. There is no guarding or rebound. Musculoskeletal:      Cervical back: Normal range of motion and neck supple. Skin:     General: Skin is warm and dry. Neurological:      General: No focal deficit present. Mental Status: He is alert and oriented to person, place, and time. Cranial Nerves: No cranial nerve deficit. Coordination: Coordination normal.          Procedures   EKG: This EKG is signed and interpreted by me. Atrial fibrillation with rapid ventricular response. Ventricular rate of 121 bpm.  Left axis deviation. AR interval undetermined. QTC not prolonged. Minimal voltage criteria for LVH. Nonspecific ST abnormalities. No significant changes compared to previous EKG on 3/25/2022. MDM   Patient is a 77-year-old male with past medical history of hypertension presenting the ED due to tachycardia. Of note, patient recently diagnosed with A. fib 2 weeks ago. He sees Dr. Flo Moritz for cardiology. Scheduled for outpatient cardioversion on 4/19/2022. Today, patient felt fatigued and has had decreased appetite at home. Wife took his heart rate and noticed it was tachycardic. On initial evaluation, patient was awake, alert and hemodynamically stable. Appeared to be in A. fib RVR on monitor. Heart rate between 120 and 135. EKG showing A. fib RVR. Patient given Lopressor and fluids in the ED with improvement in his heart rate. Spoke with Dr. Aye Jiménez who will follow the patient as an inpatient.   Discussed the patient with Dr. Sandhya Rivera who accepted for admission. Patient stable for admission and agreeable with plan. ED Course as of 04/06/22 1602   Wed Apr 06, 2022   1303 Spoke with Dr. Jovon Bennett concerning patient. He is okay with work-up up until this point. Will see patient as inpatient. [PP]   [phone] with Dr. Jorge Palafox who agreed to accept the patient for admission. [PP]      ED Course User Index  [PP] Sophie Chase, DO      --------------------------------------------- PAST HISTORY ---------------------------------------------  Past Medical History:  has a past medical history of Arthritis, Environmental allergies, Hypertension, and Renal cell carcinoma ([facility] [address] Past Surgical History:  has a past surgical history that includes Diagnostic Cardiac Cath Lab Procedure; hernia repair; Knee cartilage surgery (years ago); Pilonidal cyst excision; joint replacement; Colonoscopy; Testicle removal (1995); and total nephrectomy (7/22/14). Social History:  reports that he has quit smoking. He has never used smokeless tobacco. He reports that he does not drink alcohol and does not use drugs. Family History: family history includes Heart Failure in his father. The patients home medications have been reviewed. Allergies: Patient has no known allergies.     -------------------------------------------------- RESULTS -------------------------------------------------    LABS:  Results for orders placed or performed during the hospital encounter of 04/06/22   COVID-19, Rapid    Specimen: Nasopharyngeal Swab   Result Value Ref Range    SARS-CoV-2, NAAT Not Detected Not Detected   CBC with Auto Differential   Result Value Ref Range    WBC 4.1 (L) 4.5 - 11.5 E9/L    RBC 4.39 3.80 - 5.80 E12/L    Hemoglobin 13.2 12.5 - 16.5 g/dL    Hematocrit 40.4 37.0 - 54.0 %    MCV 92.0 80.0 - 99.9 fL    MCH 30.1 26.0 - 35.0 pg    MCHC 32.7 32.0 - 34.5 %    RDW 13.9 11.5 - 15.0 fL    Platelets 848 811 - 358 E9/L    MPV 10.1 7.0 - 12.0 fL    Neutrophils % 71.6 43.0 - 80.0 % Immature Granulocytes % 0.0 0.0 - 5.0 %    Lymphocytes % 17.7 (L) 20.0 - 42.0 %    Monocytes % 9.0 2.0 - 12.0 %    Eosinophils % 1.2 0.0 - 6.0 %    Basophils % 0.5 0.0 - 2.0 %    Neutrophils Absolute 2.95 1.80 - 7.30 E9/L    Immature Granulocytes # 0.00 E9/L    Lymphocytes Absolute 0.73 (L) 1.50 - 4.00 E9/L    Monocytes Absolute 0.37 0.10 - 0.95 E9/L    Eosinophils Absolute 0.05 0.05 - 0.50 E9/L    Basophils Absolute 0.02 0.00 - 0.20 E9/L   Comprehensive Metabolic Panel w/ Reflex to MG   Result Value Ref Range    Sodium 137 132 - 146 mmol/L    Potassium reflex Magnesium 3.8 3.5 - 5.0 mmol/L    Chloride 102 98 - 107 mmol/L    CO2 24 22 - 29 mmol/L    Anion Gap 11 7 - 16 mmol/L    Glucose 127 (H) 74 - 99 mg/dL    BUN 13 6 - 23 mg/dL    CREATININE 1.1 0.7 - 1.2 mg/dL    GFR Non-African American >60 >=60 mL/min/1.73    GFR African American >60     Calcium 9.3 8.6 - 10.2 mg/dL    Total Protein 7.1 6.4 - 8.3 g/dL    Albumin 4.3 3.5 - 5.2 g/dL    Total Bilirubin 1.0 0.0 - 1.2 mg/dL    Alkaline Phosphatase 95 40 - 129 U/L    ALT 49 (H) 0 - 40 U/L    AST 24 0 - 39 U/L   Troponin   Result Value Ref Range    Troponin, High Sensitivity 23 (H) 0 - 11 ng/L   Brain Natriuretic Peptide   Result Value Ref Range    Pro-BNP 2,358 (H) 0 - 450 pg/mL   Troponin   Result Value Ref Range    Troponin, High Sensitivity 21 (H) 0 - 11 ng/L   TSH   Result Value Ref Range    TSH 1.350 0.270 - 4.200 uIU/mL   EKG 12 Lead   Result Value Ref Range    Ventricular Rate 121 BPM    Atrial Rate 129 BPM    QRS Duration 110 ms    Q-T Interval 302 ms    QTc Calculation (Bazett) 428 ms    R Axis -34 degrees    T Axis 46 degrees       RADIOLOGY:  XR CHEST PORTABLE   Final Result   Cardiomegaly.   There are no findings of failure or pneumonia.               ------------------------- NURSING NOTES AND VITALS REVIEWED ---------------------------  Date / Time Roomed:  4/6/2022  9:25 AM  ED Bed Assignment:  17/17    The nursing notes within the ED encounter and vital signs as below have been reviewed. Patient Vitals for the past 24 hrs:   BP Temp Pulse Resp SpO2   04/06/22 1509 128/89 -- 113 18 94 %   04/06/22 1315 (!) 150/88 -- 103 18 98 %   04/06/22 1253 -- -- 111 -- --   04/06/22 1211 (!) 187/98 -- 118 16 92 %   04/06/22 1145 -- -- 108 10 93 %   04/06/22 1050 (!) 147/103 -- 112 16 97 %   04/06/22 1045 -- -- -- (!) 6 --   04/06/22 0930 (!) 146/102 97.6 °F (36.4 °C) 125 16 95 %       Oxygen Saturation Interpretation: Normal    ------------------------------------------ PROGRESS NOTES ------------------------------------------    Counseling:  I have spoken with the patient and discussed todays results, in addition to providing specific details for the plan of care and counseling regarding the diagnosis and prognosis. Their questions are answered at this time and they are agreeable with the plan of admission.    --------------------------------- ADDITIONAL PROVIDER NOTES ---------------------------------  Consultations:  Time: 0402. Spoke with Dr. Jose Kamara.  Discussed case. They will admit the patient. This patient's ED course included: a personal history and physicial examination, re-evaluation prior to disposition, multiple bedside re-evaluations, IV medications, cardiac monitoring and continuous pulse oximetry    This patient has remained hemodynamically stable during their ED course. Diagnosis:  1. Atrial fibrillation with rapid ventricular response (HCC)        Disposition:  Patient's disposition: Admit to telemetry  Patient's condition is stable.             Daniel Heath DO  Resident  04/06/22 9122

## 2022-04-06 NOTE — CONSULTS
Inpatient Cardiology Consultation      Reason for Consult:  AF with RVR    Consulting Physician: Dr. Bobby Comer    Requesting Physician:  Dr. Dayami Hinkle    Date of Consultation: 4/6/2022    HISTORY OF PRESENT ILLNESS:   Mr. Mercy Chew is a 64year old male who is known to Dr. Calista Davis. Patient initially was seen by his PCP 3/25/2022 for intermittent episodes of palpitations, decreased exercise tolerance (prior to 3/20/2022 patient was very active / rides a stationary bike / rowing machine up to 6 times per week) and new onset of orthopnea --> sent to [facility] on 3/25/2022. He was found to be in AF with RVR and was started on Eliquis 5 mg BID and Toprol-XL was increased to 50 mg QD. He was discharged to home and was seen during initial consultation with Dr. Calista Davis on 4/1/2022. EKG showed AF with  bpm, note to be mildly hypervolemic (proBNP 2400) and was started on Lasix 40 mg QD with potassium supplementation. He was recommended for sleep study and was scheduled for a MERT/DCCV on 4/19/2022 with Dr. Calista Davis. Patient reported that since he was discharged from the hospital on 3/25/2022 he has been gradually getting more weak, fatigued, decreased appetite, orthopnea without PND, abdominal bloating, early satiety with minimal lower extremity edema. He occasionally has palpitations but denies having any chest pain. He occasionally has a \"hollow sensation\" with ambulating approximately 50 feet prior to 3/25/2022 he denied having any symptoms. After he was seen by Dr. Calista Davis on 4/1/2022 he was started on Lasix 40 mg daily and has had no improved urine output. In fact he believes that he has been urinating less. His wife who is currently at the bedside reported that his blood pressure has been >150/120 at times with a pulse rate up to 140. Due to recurrent symptoms he presented to [facility] on 4/6/2022. Upon arrival to the ED: /102, , Afebrile, 95% on RA.    Labs: Na+ 137, K+ 3.8, BUN/Cr 13/1.1, proBNP 2358.  Hs-cTnT 23>>21. WBC 4.1, H/H 13.2/40.4, platelet count 080. CXR: Cardiomegaly, no findings of failure or pneumonia. ER medications: 1L IVF bolus, Lopressor 25 mg QD. EKG: AF with RVR, PVCs, Left axis deviation, NSSTT changes, rate 121 bpm.     Please note: past medical records were reviewed per electronic medical record (EMR) - see detailed reports under Past Medical/ Surgical History. Past Medical History:    1. HTN  2. Arthritis   3. Hx of solid renal mass s/p renal cell carcinoma s/p right nephrectomy (7/2014)  4. Hx of Hernia repair   5. Hx of \"minimal plaque\" in LAD, LVEF 55% on [facility] (1996, Dr. Brian Padilla). 6. Newly diagnosed AF with RVR (duration unknown, captured on EKG 3/25/2022). · [id number] score at least 4-5. On Eliquis. Scheduled for MERT/DCCV on 4/19/2022.   7. Obesity: BMI 32.28 kg/m2  8. Suspected POLINA  9. Former smoker: quit in 1971  10. Depression   11. Left popliteal cyst on US DUP LE: 3/25/2022. Medications Prior to admit:  Prior to Admission medications    Medication Sig Start Date End Date Taking? Authorizing Provider   apixaban (ELIQUIS) 5 MG TABS tablet Take 5 mg by mouth 2 times daily   Yes Historical Provider, MD   furosemide (LASIX) 40 MG tablet Take 40 mg by mouth every morning   Yes Historical Provider, MD   potassium chloride (KLOR-CON M) 20 MEQ extended release tablet Take 20 mEq by mouth every morning   Yes Historical Provider, MD   sertraline (ZOLOFT) 25 MG tablet Take 25 mg by mouth every morning  3/29/22   Historical Provider, MD   LORazepam (ATIVAN) 1 MG tablet Take 0.5-1 mg by mouth every 12 hours as needed for Anxiety.      Historical Provider, MD   APPLE CIDER VINEGAR PO Take 1 tablet by mouth every morning    Historical Provider, MD   metoprolol succinate (TOPROL XL) 25 MG extended release tablet Take 2 tablets by mouth every morning 3/25/22   Ramone Cervantes DO   doxazosin (CARDURA) 8 MG tablet Take 4 mg by mouth 2 times daily     Historical Provider, MD   meloxicam (MOBIC) 15 MG tablet Take 7.5 mg by mouth 2 times daily     Historical Provider, MD   amLODIPine (NORVASC) 10 MG tablet Take 10 mg by mouth nightly     Historical Provider, MD       Current Medications:    Current Facility-Administered Medications: metoprolol tartrate (LOPRESSOR) 25 MG tablet, , ,     Allergies:  Patient has no known allergies. Social History:    Resides with his wife. Denies alcohol and illicit drug use. Former smoker quit approximately 50 years ago: 2 packs/week x5 years. Caffeine intake: 1 cup of coffee per day. Denies tea and soda. Family History:   Father: Hx of HF (diagnosed at age 79)  Mother: History of hypertension. REVIEW OF SYSTEMS:     · Constitutional: + fatigue. Denies fevers, chills or night sweats  · Eyes: Denies visual changes or drainage  · ENT: Denies headaches or hearing loss. No mouth sores or sore throat. No epistaxis   · Cardiovascular: See HPI. Denies chest pain, pressure or palpitations. + mild lower extremity swelling. · Respiratory: + CAREY, Denies cough, + orthopnea. Denies  PND. No hemoptysis   · Gastrointestinal: Denies hematemesis or anorexia. No hematochezia or melena    · Genitourinary: Denies urgency. +dysuria. Denies hematuria. · Musculoskeletal: Denies gait disturbance, weakness or joint complaints  · Integumentary: Denies rash, hives or pruritis   · Neurological: Denies dizziness, headaches or seizures. No numbness or tingling  · Psychiatric: Denies anxiety or depression. · Endocrine: Denies temperature intolerance. No recent weight change. · Hematologic/Lymphatic: Denies abnormal bruising or bleeding. No swollen lymph nodes    PHYSICAL EXAM:   BP (!) 187/98   Pulse 111   Temp 97.6 °F (36.4 °C)   Resp 16   SpO2 92%   CONST:  Well developed, well nourished elderly obese  male who appears of stated age. Awake, alert and cooperative. No apparent distress.    HEENT:   Head- Normocephalic, atraumatic   Eyes- Conjunctivae pink, anicteric  Throat- Oral mucosa pink and moist  Neck-  No stridor, trachea midline, mild jugular venous distention. No carotid bruit. CHEST: Chest symmetrical and non-tender to palpation. No accessory muscle use or intercostal retractions  RESPIRATORY: Lung sounds - clear throughout fields. On room air  CARDIOVASCULAR:     Heart Inspection- shows no noted pulsations  Heart Palpation- no heaves or thrills; PMI is non-displaced   Heart Ausculation-IRR, no murmur. No s3 or rub   PV: Trace lower extremity edema. No varicosities. Pedal pulses palpable, no clubbing or cyanosis   ABDOMEN: Soft but appears distended, non-tender to light palpation. Bowel sounds present. No palpable masses no organomegaly; no abdominal bruit  MS: Good muscle strength and tone. No atrophy or abnormal movements. : Deferred  SKIN: Warm and dry no statis dermatitis or ulcers   NEURO / PSYCH: Oriented to person, place and time. Speech clear and appropriate. Follows all commands. Pleasant affect     DATA:    ECG: See HPI. Tele strips: AF with RVR. Diagnostic:    Labs:   CBC:   Recent Labs     04/06/22  0945   WBC 4.1*   HGB 13.2   HCT 40.4        BMP:   Recent Labs     04/06/22  0945      K 3.8   CO2 24   BUN 13   CREATININE 1.1   LABGLOM >60   CALCIUM 9.3     Mag: No results for input(s): MG in the last 72 hours. Phos: No results for input(s): PHOS in the last 72 hours. TFT: No results found for: TSH, X4VSSGE, O4HEFON, THYROIDAB, FT3, T4FREE   HgA1c: No results found for: LABA1C  No results found for: EAG  proBNP:   Recent Labs     04/06/22  0945   PROBNP 2,358*     CARDIAC ENZYMES:  Recent Labs     04/06/22  0945 04/06/22  1208   TROPHS 23* 21*     LIVER PROFILE:  Recent Labs     04/06/22  0945   AST 24   ALT 49*   LABALBU 4.3     CXR: 4/6/2022: Cardiomegaly.  There are no findings of failure or pneumonia. Assessment/Plan as per Dr. Brice Poe.   Electronically signed by JOSE Estrella - JEFFRY on 4/6/22 at 2:09 PM EDT    The above documentation has been prepared under my direction and personally reviewed by me in its entirety. I confirm that the note above accurately reflects all work, treatment, procedures, and medical decision making performed by me. The patient's history was independently obtained. The patient was independently examined. Electrocardiogram, prior and present cardiovascular assessment, and laboratory studies were reviewed. The patient is a 73-year-old white male known to [facility] with primary cardiovascular care provided by Sidney Clement. He is normally been in an active functional state including a routine exercise program but was recently hospitalized with palpitations and a decreased exercise tolerance as well as that of progressive dyspnea and orthopnea as well as early satiety and noted to be in atrial fibrillation with initiation of anticoagulation and rate control. At the time of subsequent outpatient consultation with Geovanna Nation, on the basis of volume overload, diuretics were initiated in the face of moderate obesity in characteristics of obstructive sleep apnea plans of an outpatient sleep assessment in addition to plans of electrical cardioversion following therapeutic anticoagulation. He additionally has a history of hypertension, a right renal cell carcinoma and moderate obesity.   In spite of his present regimen, his symptoms have persisted and mildly progressed contributing to present emergency room evaluation at which time a resting electrocardiogram reviewed time of his assessment demonstrated evidence of atrial fibrillation with a mean ventricular response of approximately 120 bpm with evidence of left axis deviation, borderline voltage for left ventricular hypertrophy and a delayed precordial transition zone as well as a chest x-ray again reviewed demonstrating borderline cardiomegaly and interstitial infiltrates with mild elevation of high-sensitivity troponin levels in a pattern not suggestive of an acute coronary syndrome and unchanged from that of previous assessment as well as that of a proBNP level of 2360 pg/mL again unchanged from that previously documented. He was initially treated with intravenous fluids and a single dose of oral beta-blocker in the emergency room with persistent marginal rate control of his atrial fibrillation. At the time of evaluation he specifically denies any symptoms of anginal-like chest discomfort or other ischemic equivalents and becomes mildly tachypneic with conversation, especially in the semirecumbent position with minimal pretibial edema. At the time of evaluation, the patient's medications and allergies were reviewed as well as that of his past medical history and review of systems document. On examination, the patient appears in no significant discomfort nor distress as mentioned above is mildly tachypneic with conversation and with mild manifestations of orthopnea. He is hemodynamically stable vital signs as documented. Jugular venous pressure appears approximately 6 cm with no carotid bruits. Lung fields are essentially clear to auscultation. Cardiac examination is notable for an irregular rhythm with no gallop rhythm or cardiac murmur. A benign abdominal examination is present and trivial pretibial edema noted. Diagnostic Assessment and Plan: On a clinical basis, the patient presents with persistent atrial fibrillation with marginal rate control and associated heart failure either of a diastolic or combined systolic and diastolic component with potential adverse effects on left ventricular systolic dysfunction in the face of his persistent tachycardia.   Mild clinical volume overload is noted and especially in view of fluids administered in the emergency room, diuresis will be initiated with a short-term rate control and anticoagulation strategy and plans of attempted transesophageal echocardiographic guided cardioversion tomorrow. Based on echocardiographic results, further optimization of medical management may be indicated inclusive of discontinuation of his alpha antagonist (doxazosin) as well as recommended discontinuation of his nonsteroidal anti-inflammatory therapy to reduce both risks of fluid retention and adverse renal effects. At the time of his evaluation, he has condition including proposed transesophageal echocardiographic guided cardioversion inclusive of benefits, risks and alternatives were discussed with he and his wife. Thank you for allowing me to participate in your patient's care. Please feel free to contact me if you have any questions or concerns. King Epley.  Connor Medina, [address] [facility]

## 2022-04-07 ENCOUNTER — APPOINTMENT (OUTPATIENT)
Dept: NON INVASIVE DIAGNOSTICS | Age: 77
DRG: 308 | End: 2022-04-07
Payer: MEDICARE

## 2022-04-07 ENCOUNTER — ANESTHESIA EVENT (OUTPATIENT)
Dept: NON INVASIVE DIAGNOSTICS | Age: 77
DRG: 308 | End: 2022-04-07
Payer: MEDICARE

## 2022-04-07 ENCOUNTER — ANESTHESIA (OUTPATIENT)
Dept: NON INVASIVE DIAGNOSTICS | Age: 77
DRG: 308 | End: 2022-04-07
Payer: MEDICARE

## 2022-04-07 VITALS
OXYGEN SATURATION: 94 % | RESPIRATION RATE: 34 BRPM | DIASTOLIC BLOOD PRESSURE: 80 MMHG | SYSTOLIC BLOOD PRESSURE: 133 MMHG

## 2022-04-07 LAB
ANION GAP SERPL CALCULATED.3IONS-SCNC: 12 MMOL/L (ref 7–16)
BUN BLDV-MCNC: 12 MG/DL (ref 6–23)
CALCIUM SERPL-MCNC: 9.3 MG/DL (ref 8.6–10.2)
CHLORIDE BLD-SCNC: 106 MMOL/L (ref 98–107)
CO2: 24 MMOL/L (ref 22–29)
CREAT SERPL-MCNC: 1.1 MG/DL (ref 0.7–1.2)
EKG ATRIAL RATE: 129 BPM
EKG Q-T INTERVAL: 302 MS
EKG QRS DURATION: 110 MS
EKG QTC CALCULATION (BAZETT): 428 MS
EKG R AXIS: -34 DEGREES
EKG T AXIS: 46 DEGREES
EKG VENTRICULAR RATE: 121 BPM
GFR AFRICAN AMERICAN: >60
GFR NON-AFRICAN AMERICAN: >60 ML/MIN/1.73
GLUCOSE BLD-MCNC: 105 MG/DL (ref 74–99)
HCT VFR BLD CALC: 40.3 % (ref 37–54)
HEMOGLOBIN: 13.5 G/DL (ref 12.5–16.5)
MCH RBC QN AUTO: 31 PG (ref 26–35)
MCHC RBC AUTO-ENTMCNC: 33.5 % (ref 32–34.5)
MCV RBC AUTO: 92.4 FL (ref 80–99.9)
PDW BLD-RTO: 13.9 FL (ref 11.5–15)
PLATELET # BLD: 220 E9/L (ref 130–450)
PMV BLD AUTO: 10.1 FL (ref 7–12)
POTASSIUM REFLEX MAGNESIUM: 3.7 MMOL/L (ref 3.5–5)
RBC # BLD: 4.36 E12/L (ref 3.8–5.8)
SODIUM BLD-SCNC: 142 MMOL/L (ref 132–146)
WBC # BLD: 4.1 E9/L (ref 4.5–11.5)

## 2022-04-07 PROCEDURE — 3700000001 HC ADD 15 MINUTES (ANESTHESIA)

## 2022-04-07 PROCEDURE — 6370000000 HC RX 637 (ALT 250 FOR IP): Performed by: INTERNAL MEDICINE

## 2022-04-07 PROCEDURE — 7100000011 HC PHASE II RECOVERY - ADDTL 15 MIN

## 2022-04-07 PROCEDURE — 93005 ELECTROCARDIOGRAM TRACING: CPT | Performed by: INTERNAL MEDICINE

## 2022-04-07 PROCEDURE — 92960 CARDIOVERSION ELECTRIC EXT: CPT

## 2022-04-07 PROCEDURE — 2580000003 HC RX 258: Performed by: NURSE ANESTHETIST, CERTIFIED REGISTERED

## 2022-04-07 PROCEDURE — 80048 BASIC METABOLIC PNL TOTAL CA: CPT

## 2022-04-07 PROCEDURE — 99233 SBSQ HOSP IP/OBS HIGH 50: CPT | Performed by: INTERNAL MEDICINE

## 2022-04-07 PROCEDURE — 92960 CARDIOVERSION ELECTRIC EXT: CPT | Performed by: INTERNAL MEDICINE

## 2022-04-07 PROCEDURE — 93320 DOPPLER ECHO COMPLETE: CPT

## 2022-04-07 PROCEDURE — 93312 ECHO TRANSESOPHAGEAL: CPT

## 2022-04-07 PROCEDURE — 5A2204Z RESTORATION OF CARDIAC RHYTHM, SINGLE: ICD-10-PCS | Performed by: INTERNAL MEDICINE

## 2022-04-07 PROCEDURE — 36415 COLL VENOUS BLD VENIPUNCTURE: CPT

## 2022-04-07 PROCEDURE — B246ZZ4 ULTRASONOGRAPHY OF RIGHT AND LEFT HEART, TRANSESOPHAGEAL: ICD-10-PCS | Performed by: INTERNAL MEDICINE

## 2022-04-07 PROCEDURE — 85027 COMPLETE CBC AUTOMATED: CPT

## 2022-04-07 PROCEDURE — 93010 ELECTROCARDIOGRAM REPORT: CPT | Performed by: INTERNAL MEDICINE

## 2022-04-07 PROCEDURE — 2060000000 HC ICU INTERMEDIATE R&B

## 2022-04-07 PROCEDURE — 93325 DOPPLER ECHO COLOR FLOW MAPG: CPT

## 2022-04-07 PROCEDURE — 2580000003 HC RX 258: Performed by: INTERNAL MEDICINE

## 2022-04-07 PROCEDURE — 3700000000 HC ANESTHESIA ATTENDED CARE

## 2022-04-07 PROCEDURE — 6360000002 HC RX W HCPCS: Performed by: NURSE ANESTHETIST, CERTIFIED REGISTERED

## 2022-04-07 PROCEDURE — 7100000010 HC PHASE II RECOVERY - FIRST 15 MIN

## 2022-04-07 RX ORDER — SODIUM CHLORIDE 9 MG/ML
INJECTION, SOLUTION INTRAVENOUS CONTINUOUS PRN
Status: DISCONTINUED | OUTPATIENT
Start: 2022-04-07 | End: 2022-04-07 | Stop reason: SDUPTHER

## 2022-04-07 RX ORDER — PROPOFOL 10 MG/ML
INJECTION, EMULSION INTRAVENOUS PRN
Status: DISCONTINUED | OUTPATIENT
Start: 2022-04-07 | End: 2022-04-07 | Stop reason: SDUPTHER

## 2022-04-07 RX ADMIN — METOPROLOL SUCCINATE 50 MG: 25 TABLET, EXTENDED RELEASE ORAL at 08:13

## 2022-04-07 RX ADMIN — DOXAZOSIN 4 MG: 4 TABLET ORAL at 08:39

## 2022-04-07 RX ADMIN — FUROSEMIDE 40 MG: 40 TABLET ORAL at 08:39

## 2022-04-07 RX ADMIN — APIXABAN 5 MG: 5 TABLET, FILM COATED ORAL at 20:35

## 2022-04-07 RX ADMIN — Medication 10 ML: at 08:40

## 2022-04-07 RX ADMIN — DOXAZOSIN 4 MG: 4 TABLET ORAL at 20:34

## 2022-04-07 RX ADMIN — ACETAMINOPHEN 650 MG: 325 TABLET ORAL at 20:48

## 2022-04-07 RX ADMIN — PROPOFOL 210 MG: 10 INJECTION, EMULSION INTRAVENOUS at 13:48

## 2022-04-07 RX ADMIN — SODIUM CHLORIDE: 9 INJECTION, SOLUTION INTRAVENOUS at 13:44

## 2022-04-07 RX ADMIN — APIXABAN 5 MG: 5 TABLET, FILM COATED ORAL at 08:39

## 2022-04-07 RX ADMIN — Medication 10 ML: at 21:43

## 2022-04-07 RX ADMIN — LORAZEPAM 0.5 MG: 0.5 TABLET ORAL at 20:35

## 2022-04-07 ASSESSMENT — PAIN SCALES - GENERAL
PAINLEVEL_OUTOF10: 0
PAINLEVEL_OUTOF10: 0
PAINLEVEL_OUTOF10: 3
PAINLEVEL_OUTOF10: 0
PAINLEVEL_OUTOF10: 0

## 2022-04-07 ASSESSMENT — PAIN - FUNCTIONAL ASSESSMENT: PAIN_FUNCTIONAL_ASSESSMENT: 0-10

## 2022-04-07 NOTE — PROCEDURES
TRANSESOPHAGEAL ECHOCARDIOGRAPHY / CARDIOVERSION    CARDIOLOGIST: Dr. Natividad Smith: MERT / Cardioversion    DATE OF PROCEDURE: 4/7/2022    HISTORY: Symptomatic atrial fibrillation    MERT findings: Grossly normal LV function, normal RV function, no hemodynamically significant valve disease, no evidence of vegetations, no left atrial or left atrial appendage thrombus (no evidence of spontaneous echo contrast), no intraatrial shunting on bubble study, no significant atherosclerosis of the aorta. TECHNIQUE: Risks, benefits and alternatives to MERT guided DC cardioversion were explained to the patient at length, and the patient acknowledged understanding. The patient was in a stable fasting condition. Cardiac rhythm, arterial oxygen saturation and systemic blood pressure were continuously monitored on a beat to beat basis. The patient was sedated by the Department of Anesthesiology. After the procedure, the patient was observed and when alert, was discharged in stable condition. RESULTS:  Patch/Paddle Position: Anterior/posterior  Energy (Joules): 200  Initial Rhythm: Atrial fibrillation  Outcome Rhythm: Sinus rhythm  COMPLICATIONS: None  CONCLUSION:  Successful electrical cardioversion of atrial fibrillation. Codie Monterrosos, [address] [facility]

## 2022-04-07 NOTE — PROGRESS NOTES
Internal Medicine  Progress Note  Gregory Gilbert MD     Subjective:     Patient is alert. Patient reports OK nervous. Tolerating diet well no other c/o. Scheduled Meds:   metoprolol succinate  50 mg Oral Daily    sodium chloride flush  5-40 mL IntraVENous 2 times per day    [Held by provider] amLODIPine  10 mg Oral Nightly    apixaban  5 mg Oral BID    doxazosin  4 mg Oral BID    furosemide  40 mg Oral QAM    potassium chloride  20 mEq Oral QAM    sertraline  25 mg Oral QAM     Continuous Infusions:   sodium chloride       PRN Meds:sodium chloride flush, sodium chloride, acetaminophen, ondansetron **OR** ondansetron, LORazepam    Objective:      Physical Exam:  Vitals:   /79   Pulse 118   Temp 98.6 °F (37 °C) (Oral)   Resp 18   Ht 6' (1.829 m)   Wt 232 lb 1.6 oz (105.3 kg)   SpO2 93%   BMI 31.48 kg/m²   Orthostatic BPs and Heart Rates:     I/O's    Intake/Output Summary (Last 24 hours) at 4/7/2022 5328  Last data filed at 4/6/2022 1507  Gross per 24 hour   Intake --   Output 500 ml   Net -500 ml     Exam:  General appearance: alert, appears stated age and cooperative  Head: Normocephalic, without obvious abnormality, atraumatic  Eyes: conjunctivae/corneas clear. PERRL, EOM's intact. Fundi benign. Throat: lips, mucosa, and tongue normal; teeth and gums normal  Neck: no adenopathy, no carotid bruit, no JVD, supple, symmetrical, trachea midline and thyroid not enlarged, symmetric, no tenderness/mass/nodules  Back: symmetric, no curvature. ROM normal. No CVA tenderness.   Lungs: clear to auscultation bilaterally  Chest wall: no tenderness  Heart: irregularly irregular rhythm  Abdomen: soft, non-tender; bowel sounds normal; no masses,  no organomegaly  Extremities: extremities normal, atraumatic, no cyanosis or edema  Pulses: 2+ and symmetric  Skin: Skin color, texture, turgor normal. No rashes or lesions  Lymph nodes: Cervical, supraclavicular, and axillary nodes normal.  Neurologic: Grossly normal      Imaging     Chest  Xray:  No results found for this or any previous visit. Results for orders placed during the hospital encounter of 04/06/22    XR CHEST PORTABLE    Narrative  EXAMINATION:  ONE XRAY VIEW OF THE CHEST    4/6/2022 10:03 am    COMPARISON:  03/25/2022    HISTORY:  ORDERING SYSTEM PROVIDED HISTORY: afib  TECHNOLOGIST PROVIDED HISTORY:  Reason for exam:->afib    FINDINGS:  The heart is enlarged. There is no mediastinal widening. There are no  findings of CHF or pneumonia. There is no pleural effusion. Impression  Cardiomegaly. There are no findings of failure or pneumonia.       Additional Imaging:  none    Lab Review   Recent Results (from the past 24 hour(s))   EKG 12 Lead    Collection Time: 04/06/22  9:30 AM   Result Value Ref Range    Ventricular Rate 121 BPM    Atrial Rate 129 BPM    QRS Duration 110 ms    Q-T Interval 302 ms    QTc Calculation (Bazett) 428 ms    R Axis -34 degrees    T Axis 46 degrees   CBC with Auto Differential    Collection Time: 04/06/22  9:45 AM   Result Value Ref Range    WBC 4.1 (L) 4.5 - 11.5 E9/L    RBC 4.39 3.80 - 5.80 E12/L    Hemoglobin 13.2 12.5 - 16.5 g/dL    Hematocrit 40.4 37.0 - 54.0 %    MCV 92.0 80.0 - 99.9 fL    MCH 30.1 26.0 - 35.0 pg    MCHC 32.7 32.0 - 34.5 %    RDW 13.9 11.5 - 15.0 fL    Platelets 598 843 - 893 E9/L    MPV 10.1 7.0 - 12.0 fL    Neutrophils % 71.6 43.0 - 80.0 %    Immature Granulocytes % 0.0 0.0 - 5.0 %    Lymphocytes % 17.7 (L) 20.0 - 42.0 %    Monocytes % 9.0 2.0 - 12.0 %    Eosinophils % 1.2 0.0 - 6.0 %    Basophils % 0.5 0.0 - 2.0 %    Neutrophils Absolute 2.95 1.80 - 7.30 E9/L    Immature Granulocytes # 0.00 E9/L    Lymphocytes Absolute 0.73 (L) 1.50 - 4.00 E9/L    Monocytes Absolute 0.37 0.10 - 0.95 E9/L    Eosinophils Absolute 0.05 0.05 - 0.50 E9/L    Basophils Absolute 0.02 0.00 - 0.20 E9/L   Comprehensive Metabolic Panel w/ Reflex to MG    Collection Time: 04/06/22  9:45 AM   Result Value Ref Range    Sodium 137 132 - 146 mmol/L    Potassium reflex Magnesium 3.8 3.5 - 5.0 mmol/L    Chloride 102 98 - 107 mmol/L    CO2 24 22 - 29 mmol/L    Anion Gap 11 7 - 16 mmol/L    Glucose 127 (H) 74 - 99 mg/dL    BUN 13 6 - 23 mg/dL    CREATININE 1.1 0.7 - 1.2 mg/dL    GFR Non-African American >60 >=60 mL/min/1.73    GFR African American >60     Calcium 9.3 8.6 - 10.2 mg/dL    Total Protein 7.1 6.4 - 8.3 g/dL    Albumin 4.3 3.5 - 5.2 g/dL    Total Bilirubin 1.0 0.0 - 1.2 mg/dL    Alkaline Phosphatase 95 40 - 129 U/L    ALT 49 (H) 0 - 40 U/L    AST 24 0 - 39 U/L   Troponin    Collection Time: 04/06/22  9:45 AM   Result Value Ref Range    Troponin, High Sensitivity 23 (H) 0 - 11 ng/L   Brain Natriuretic Peptide    Collection Time: 04/06/22  9:45 AM   Result Value Ref Range    Pro-BNP 2,358 (H) 0 - 450 pg/mL   Troponin    Collection Time: 04/06/22 12:08 PM   Result Value Ref Range    Troponin, High Sensitivity 21 (H) 0 - 11 ng/L   COVID-19, Rapid    Collection Time: 04/06/22  2:27 PM    Specimen: Nasopharyngeal Swab   Result Value Ref Range    SARS-CoV-2, NAAT Not Detected Not Detected   TSH    Collection Time: 04/06/22  2:27 PM   Result Value Ref Range    TSH 1.350 0.270 - 4.200 uIU/mL     Assessment:     Active Problems:    Atrial fibrillation with rapid ventricular response (HCC)  Resolved Problems:    * No resolved hospital problems. *      Plan:    For cardioversion  Likely home tomorrow  Vinita Bauman MD  4/7/2022  6:33 AM

## 2022-04-07 NOTE — ANESTHESIA POSTPROCEDURE EVALUATION
Department of Anesthesiology  Postprocedure Note    Patient: Shaji Aviles  MRN: [id number]  YOB: 1945  Date of evaluation: 4/7/2022  Time:  2:07 PM     Procedure Summary     Date: 04/07/22 Room / Location: [facility] Echocardiography    Anesthesia Start: 1344 Anesthesia Stop: 1406    Procedure: TRANSESOPHAGEAL ECHO Diagnosis:     Scheduled Providers:  Responsible Provider: Krissy Barry MD    Anesthesia Type: MAC ASA Status: 4          Anesthesia Type: MAC    Brian Phase I:      Brian Phase II:      Last vitals: Reviewed and per EMR flowsheets.        Anesthesia Post Evaluation    Patient location during evaluation: bedside  Patient participation: complete - patient participated  Level of consciousness: awake and alert  Airway patency: patent  Nausea & Vomiting: no nausea and no vomiting  Complications: no  Cardiovascular status: blood pressure returned to baseline  Respiratory status: acceptable  Hydration status: euvolemic

## 2022-04-07 NOTE — PROGRESS NOTES
Patient states he was told not to take any meds prior to MERT today, confirmed with cardiology NP and Dr. Javier Torrez requesting patient to take lasix and eliquis.

## 2022-04-07 NOTE — PROGRESS NOTES
INPATIENT CARDIOLOGY FOLLOW-UP    Name: Yoni Crump    Age: 64 y.o. Date of Admission: 4/6/2022  9:25 AM    Date of Service: 4/7/2022    Chief Complaint: Follow-up for acute superimposed upon chronic presumed diastolic heart failure, persistent atrial fibrillation, hypertension, moderate obesity    Interim History: The patient continues to remain mildly symptomatic with mild orthopnea in spite of adequate oxygen saturations in the absence of supplemental oxygen requirements. Persistent suboptimal rate control of his atrial fibrillation is noted. Review of Systems: The remainder of a complete multisystem review including consitutional, central nervous, respiratory, circulatory, gastrointestinal, genitourinary, endocrinologic, hematologic, musculoskeletal and psychiatric are negative.     Problem List:  Patient Active Problem List   Diagnosis    History of cardiac catheterization    [facility] [address]    Renal cell cancer ([facility] [address]    HTN (hypertension), benign    Hypokalemia    Atrial fibrillation with rapid ventricular response (HCC)       Allergies:  No Known Allergies    Current Medications:  Current Facility-Administered Medications   Medication Dose Route Frequency Provider Last Rate Last Admin    metoprolol succinate (TOPROL XL) extended release tablet 50 mg  50 mg Oral Daily Latanya Patton MD   50 mg at 04/07/22 0813    sodium chloride flush 0.9 % injection 5-40 mL  5-40 mL IntraVENous 2 times per day Latanya Patton MD   10 mL at 04/07/22 0840    sodium chloride flush 0.9 % injection 5-40 mL  5-40 mL IntraVENous PRN Latanya Patton MD        0.9 % sodium chloride infusion   IntraVENous PRN MD Ghulam Pace acetaminophen (TYLENOL) tablet 650 mg  650 mg Oral Q4H PRN Latanya Patton MD        ondansetron (ZOFRAN-ODT) disintegrating tablet 4 mg  4 mg Oral Q8H PRN Latanya Patton MD        Or    ondansetron [facility]) injection 4 mg  4 mg IntraVENous Q6H PRN MD Ghulam Pace [facility] by provider] amLODIPine (NORVASC) tablet 10 mg  10 mg Oral Nightly Madeline Mendez MD        apixaban Romel Mince) tablet 5 mg  5 mg Oral BID Madeline Mendez MD   5 mg at 04/07/22 0839    doxazosin (CARDURA) tablet 4 mg  4 mg Oral BID Madeline Mendez MD   4 mg at 04/07/22 5721    furosemide (LASIX) tablet 40 mg  40 mg Oral QAM Madeline Mendez MD   40 mg at 04/07/22 6732    LORazepam (ATIVAN) tablet 0.5 mg  0.5 mg Oral Q12H PRN Madeline Mendez MD   0.5 mg at 04/06/22 2212    potassium chloride (KLOR-CON M) extended release tablet 20 mEq  20 mEq Oral SHELBI Mendez MD        sertraline (ZOLOFT) tablet 25 mg  25 mg Oral SHELBI Mendez MD          sodium chloride         Physical Exam:  BP (!) 141/81   Pulse 115   Temp 98.2 °F (36.8 °C) (Oral)   Resp 18   Ht 6' (1.829 m)   Wt 232 lb 1.6 oz (105.3 kg)   SpO2 97%   BMI 31.48 kg/m²   Weight change: Wt Readings from Last 3 Encounters:   04/07/22 232 lb 1.6 oz (105.3 kg)   04/01/22 238 lb (108 kg)   03/25/22 235 lb (106.6 kg)     The patient is awake, alert and in no discomfort or distress. No gross musculoskeletal deformity is present. No significant skin or nail changes are present. Gross examination of head, eyes, nose and throat are negative. Jugular venous pressure is normal and no carotid bruits are present. Normal respiratory effort is noted with no accessory muscle usage present. Lung fields are clear to ascultation. Cardiac examination is notable for an irregular rhythm with no palpable thrill. No gallop rhythm or cardiac murmur are identified. A benign abdominal examination is present with no masses or organomegaly. Intact pulses are present throughout all extremities and no peripheral edema is present. No focal neurologic deficits are present. Intake/Output:    Intake/Output Summary (Last 24 hours) at 4/7/2022 0928  Last data filed at 4/6/2022 1507  Gross per 24 hour   Intake --   Output 500 ml   Net -500 ml     No intake/output data recorded.     Laboratory Tests:  Lab Results   Component Value Date    CREATININE 1.1 04/06/2022    BUN 13 04/06/2022     04/06/2022    K 3.8 04/06/2022     04/06/2022    CO2 24 04/06/2022     No results for input(s): CKTOTAL, CKMB in the last 72 hours. Invalid input(s): TROPONONI  No results found for: BNP  Lab Results   Component Value Date    WBC 4.1 04/06/2022    RBC 4.39 04/06/2022    HGB 13.2 04/06/2022    HCT 40.4 04/06/2022    MCV 92.0 04/06/2022    MCH 30.1 04/06/2022    MCHC 32.7 04/06/2022    RDW 13.9 04/06/2022     04/06/2022    MPV 10.1 04/06/2022     Recent Labs     04/06/22  0945   ALKPHOS 95   ALT 49*   AST 24   PROT 7.1   BILITOT 1.0   LABALBU 4.3     No results found for: MG  Lab Results   Component Value Date    PROTIME 11.2 03/25/2022    INR 1.0 03/25/2022     Lab Results   Component Value Date    TSH 1.350 04/06/2022     No components found for: CHLPL  No results found for: TRIG  No results found for: HDL  No results found for: [facility]    Cardiac Tests:  Telemetry findings reviewed: atrial fibrillation with a mean ventricular response of approximately 120 bpm, no new tachy/bradyarrhythmias overnight      ASSESSMENT / PLAN: On a clinical basis, the patient appears compensated from a cardiovascular standpoint with persistent symptoms of mild orthopnea and in the absence of significant volume overload and persistent suboptimal rate control of his atrial arrhythmias. A transesophageal echocardiographic guided cardioversion has been scheduled for later today with echocardiographic findings determining further modification of his medical regimen including if systolic dysfunction is noted the discontinuation of his alpha antagonist and optimization of his antihypertensive medical regimen in addition to that of additional recommendations should sinus rhythm be able to be restored.   Continued appropriate monitoring of his volume status in addition to that of renal function electrolytes will be necessary with optimization of medical management. At the time of evaluation, the transesophageal echocardiographic guided procedure has been again discussed as well as an extensive discussion of his clinical status and recommendations with primary care. On long-term basis, continued aggressive risk factor modification blood pressure and serum lipids will remain essential to reducing risk of future atherosclerotic development in addition to that of needs of rescheduling of his formal sleep assessment to assist in management of likely associated obstructive sleep apnea to reduce risk of adverse cardiovascular effects including that of an increased risk of recurrent arrhythmias. Note: This report was completed utilizing computer voice recognition software. Every effort has been made to ensure accuracy, however; inadvertent computerized transcription errors may be present. Víctor Magallanes.  [facility], [facility]

## 2022-04-07 NOTE — CARE COORDINATION
COVID - 4/6. For MERT/CV today. Met w/ patient and wife Miguel Severance. Explained role of  and plan of care. Lives in a 2 story house- 4 steps to entrance. Independent PTA. Hx Navarino snf. No Hx HHC. On Eliquis PTA. States is following up w/ an OPT sleep study for home CPA/Bipap eval. PCP is Dr. Jose Kamara and pharmacy is [address] Per pt, plan is to return home on discharge-anticipating no needs.  Will follow Thania Bryan, RN case manager

## 2022-04-07 NOTE — ANESTHESIA PRE PROCEDURE
Department of Anesthesiology  Preprocedure Note       Name:  Shi Angelo   Age:  64 y.o.  :  1945                                          MRN:  [id number]         Date:  2022      Surgeon: * No surgeons listed *    Procedure: * No procedures listed *    Medications prior to admission:   Prior to Admission medications    Medication Sig Start Date End Date Taking? Authorizing Provider   apixaban (ELIQUIS) 5 MG TABS tablet Take 5 mg by mouth 2 times daily   Yes Historical Provider, MD   furosemide (LASIX) 40 MG tablet Take 40 mg by mouth every morning   Yes Historical Provider, MD   potassium chloride (KLOR-CON M) 20 MEQ extended release tablet Take 20 mEq by mouth every morning   Yes Historical Provider, MD   sertraline (ZOLOFT) 25 MG tablet Take 25 mg by mouth every morning  3/29/22   Historical Provider, MD   LORazepam (ATIVAN) 1 MG tablet Take 0.5-1 mg by mouth every 12 hours as needed for Anxiety.      Historical Provider, MD   APPLE CIDER VINEGAR PO Take 1 tablet by mouth every morning    Historical Provider, MD   metoprolol succinate (TOPROL XL) 25 MG extended release tablet Take 2 tablets by mouth every morning 3/25/22   Regina Robertson DO   doxazosin (CARDURA) 8 MG tablet Take 4 mg by mouth 2 times daily     Historical Provider, MD   meloxicam (MOBIC) 15 MG tablet Take 7.5 mg by mouth 2 times daily     Historical Provider, MD   amLODIPine (NORVASC) 10 MG tablet Take 10 mg by mouth nightly     Historical Provider, MD       Current medications:    Current Facility-Administered Medications   Medication Dose Route Frequency Provider Last Rate Last Admin    metoprolol succinate (TOPROL XL) extended release tablet 50 mg  50 mg Oral Daily Zaida Wick MD   50 mg at 22 0813    sodium chloride flush 0.9 % injection 5-40 mL  5-40 mL IntraVENous 2 times per day Zaida Wick MD   10 mL at 22 0840    sodium chloride flush 0.9 % injection 5-40 mL  5-40 mL IntraVENous PRN Zaida Wick MD       [facility] 0.9 % sodium chloride infusion   IntraVENous PRN Brandi Rodas MD        acetaminophen (TYLENOL) tablet 650 mg  650 mg Oral Q4H PRN Brandi Rodas MD        ondansetron (ZOFRAN-ODT) disintegrating tablet 4 mg  4 mg Oral Q8H PRN Brandi Rodas MD        Or    [facility]ron [facility]) injection 4 mg  4 mg IntraVENous Q6H PRN MD Maxwell [facility] [place] by provider] amLODIPine (NORVASC) tablet 10 mg  10 mg Oral Nightly Brandi Rodas MD        apixaban Raffaele Beards) tablet 5 mg  5 mg Oral BID Brandi Rodas MD   5 mg at 04/07/22 7171    doxazosin (CARDURA) tablet 4 mg  4 mg Oral BID Brandi Rodas MD   4 mg at 04/07/22 1073    furosemide (LASIX) tablet 40 mg  40 mg Oral QAM Brandi Rodas MD   40 mg at 04/07/22 8406    LORazepam (ATIVAN) tablet 0.5 mg  0.5 mg Oral Q12H PRN Brandi Rodas MD   0.5 mg at 04/06/22 2212    potassium chloride (KLOR-CON M) extended release tablet 20 mEq  20 mEq Oral QAM Brandi Rodas MD        sertraline (ZOLOFT) tablet 25 mg  25 mg Oral QAM Brandi Rodas MD           Allergies:  No Known Allergies    Problem List:    Patient Active Problem List   Diagnosis Code    History of cardiac catheterization Z98.890    Ileus ([facility] [address] K56.7    Renal cell cancer ([address] C64.9    HTN (hypertension), benign I10    Hypokalemia E87.6    Atrial fibrillation with rapid ventricular response ([address] I48.91       Past Medical History:        Diagnosis Date    Arthritis     Environmental allergies     grass mold trees    Hypertension     Renal cell carcinoma ([address] July 2014    s/p right nephrectomy       Past Surgical History:        Procedure Laterality Date    COLONOSCOPY      DIAGNOSTIC CARDIAC CATH LAB PROCEDURE      HERNIA REPAIR      JOINT REPLACEMENT      beto hips    KNEE CARTILAGE SURGERY  years ago    right    PILONIDAL CYST EXCISION      TESTICLE REMOVAL  1995    TOTAL NEPHRECTOMY  7/22/14    right radical nephrectomy laparoscopic hand assisted with cystoscopy right ureteral catheter insertion        Social History: Social History     Tobacco Use    Smoking status: Former Smoker    Smokeless tobacco: Never Used    Tobacco comment: quit in 1971   Substance Use Topics    Alcohol use: No                                Counseling given: Not Answered  Comment: quit in 1971      Vital Signs (Current):   Vitals:    04/07/22 0200 04/07/22 0532 04/07/22 0800 04/07/22 1204   BP: 108/79  (!) 141/81 126/70   Pulse: 118  115 114   Resp: 18  18 16   Temp: 98.6 °F (37 °C)  98.2 °F (36.8 °C)    TempSrc: Oral  Oral    SpO2:   97% 96%   Weight:  232 lb 1.6 oz (105.3 kg)  232 lb (105.2 kg)   Height:    6' (1.829 m)                                              BP Readings from Last 3 Encounters:   04/07/22 126/70   04/01/22 108/82   03/25/22 104/84       NPO Status: Time of last liquid consumption: 2100                        Time of last solid consumption: 2100                        Date of last liquid consumption: 04/06/22                        Date of last solid food consumption: 04/06/22    BMI:   Wt Readings from Last 3 Encounters:   04/07/22 232 lb (105.2 kg)   04/01/22 238 lb (108 kg)   03/25/22 235 lb (106.6 kg)     Body mass index is 31.46 kg/m². CBC:   Lab Results   Component Value Date    WBC 4.1 04/07/2022    RBC 4.36 04/07/2022    HGB 13.5 04/07/2022    HCT 40.3 04/07/2022    MCV 92.4 04/07/2022    RDW 13.9 04/07/2022     04/07/2022       CMP:   Lab Results   Component Value Date     04/07/2022    K 3.7 04/07/2022     04/07/2022    CO2 24 04/07/2022    BUN 12 04/07/2022    CREATININE 1.1 04/07/2022    GFRAA >60 04/07/2022    LABGLOM >60 04/07/2022    GLUCOSE 105 04/07/2022    PROT 7.1 04/06/2022    CALCIUM 9.3 04/07/2022    BILITOT 1.0 04/06/2022    ALKPHOS 95 04/06/2022    AST 24 04/06/2022    ALT 49 04/06/2022       POC Tests: No results for input(s): POCGLU, POCNA, POCK, POCCL, POCBUN, POCHEMO, POCHCT in the last 72 hours.     Coags:   Lab Results   Component Value Date    PROTIME 11.2 03/25/2022 INR 1.0 03/25/2022    APTT 27.2 03/25/2022       HCG (If Applicable): No results found for: PREGTESTUR, PREGSERUM, HCG, HCGQUANT     ABGs: No results found for: PHART, PO2ART, SHN1UME, NLF7OZJ, BEART, A5SUNSPN     Type & Screen (If Applicable):  No results found for: LABABO, LABRH    Drug/Infectious Status (If Applicable):  No results found for: HIV, HEPCAB    COVID-19 Screening (If Applicable):   Lab Results   Component Value Date    COVID19 Not Detected 04/06/2022           Anesthesia Evaluation  Patient summary reviewed no history of anesthetic complications:   Airway: Mallampati: I  TM distance: >3 FB   Neck ROM: full   Dental:          Pulmonary:Negative Pulmonary ROS breath sounds clear to auscultation                             Cardiovascular:    (+) hypertension:, dysrhythmias: atrial fibrillation, CHF:,         Rhythm: irregular  Rate: abnormal           Beta Blocker:  Dose within 24 Hrs         Neuro/Psych:   Negative Neuro/Psych ROS              GI/Hepatic/Renal: Neg GI/Hepatic/Renal ROS            Endo/Other:    (+) blood dyscrasia: anticoagulation therapy, arthritis: OA., malignancy/cancer (Renal cell carcinoma ([address]). ROS comment: obese Abdominal:             Vascular: negative vascular ROS. Other Findings:             Anesthesia Plan      MAC     ASA 4       Induction: intravenous. Anesthetic plan and risks discussed with patient. Plan discussed with CRNA.                   Stoney Taveras MD   4/7/2022

## 2022-04-08 ENCOUNTER — TELEPHONE (OUTPATIENT)
Dept: CARDIOLOGY CLINIC | Age: 77
End: 2022-04-08

## 2022-04-08 VITALS
WEIGHT: 232.38 LBS | HEIGHT: 72 IN | TEMPERATURE: 98.6 F | DIASTOLIC BLOOD PRESSURE: 95 MMHG | SYSTOLIC BLOOD PRESSURE: 146 MMHG | RESPIRATION RATE: 18 BRPM | BODY MASS INDEX: 31.48 KG/M2 | HEART RATE: 66 BPM | OXYGEN SATURATION: 95 %

## 2022-04-08 LAB
ANION GAP SERPL CALCULATED.3IONS-SCNC: 10 MMOL/L (ref 7–16)
BUN BLDV-MCNC: 17 MG/DL (ref 6–23)
CALCIUM SERPL-MCNC: 9.1 MG/DL (ref 8.6–10.2)
CHLORIDE BLD-SCNC: 106 MMOL/L (ref 98–107)
CO2: 26 MMOL/L (ref 22–29)
CREAT SERPL-MCNC: 1.1 MG/DL (ref 0.7–1.2)
GFR AFRICAN AMERICAN: >60
GFR NON-AFRICAN AMERICAN: >60 ML/MIN/1.73
GLUCOSE BLD-MCNC: 93 MG/DL (ref 74–99)
POTASSIUM SERPL-SCNC: 3.3 MMOL/L (ref 3.5–5)
SODIUM BLD-SCNC: 142 MMOL/L (ref 132–146)

## 2022-04-08 PROCEDURE — 36415 COLL VENOUS BLD VENIPUNCTURE: CPT

## 2022-04-08 PROCEDURE — 80048 BASIC METABOLIC PNL TOTAL CA: CPT

## 2022-04-08 PROCEDURE — 2580000003 HC RX 258: Performed by: INTERNAL MEDICINE

## 2022-04-08 PROCEDURE — 99233 SBSQ HOSP IP/OBS HIGH 50: CPT | Performed by: INTERNAL MEDICINE

## 2022-04-08 PROCEDURE — 93308 TTE F-UP OR LMTD: CPT

## 2022-04-08 PROCEDURE — 6370000000 HC RX 637 (ALT 250 FOR IP): Performed by: INTERNAL MEDICINE

## 2022-04-08 RX ORDER — VALSARTAN 80 MG/1
80 TABLET ORAL DAILY
Status: DISCONTINUED | OUTPATIENT
Start: 2022-04-08 | End: 2022-04-08 | Stop reason: HOSPADM

## 2022-04-08 RX ORDER — VALSARTAN 80 MG/1
80 TABLET ORAL DAILY
Qty: 30 TABLET | Refills: 3 | Status: SHIPPED | OUTPATIENT
Start: 2022-04-09 | End: 2022-04-14 | Stop reason: ALTCHOICE

## 2022-04-08 RX ORDER — POTASSIUM CHLORIDE 20 MEQ/1
40 TABLET, EXTENDED RELEASE ORAL ONCE
Status: COMPLETED | OUTPATIENT
Start: 2022-04-08 | End: 2022-04-08

## 2022-04-08 RX ADMIN — Medication 10 ML: at 08:31

## 2022-04-08 RX ADMIN — POTASSIUM CHLORIDE 40 MEQ: 20 TABLET, EXTENDED RELEASE ORAL at 08:29

## 2022-04-08 RX ADMIN — METOPROLOL SUCCINATE 50 MG: 25 TABLET, EXTENDED RELEASE ORAL at 08:29

## 2022-04-08 RX ADMIN — VALSARTAN 80 MG: 80 TABLET, FILM COATED ORAL at 09:41

## 2022-04-08 RX ADMIN — APIXABAN 5 MG: 5 TABLET, FILM COATED ORAL at 08:29

## 2022-04-08 RX ADMIN — SERTRALINE HYDROCHLORIDE 25 MG: 50 TABLET ORAL at 08:29

## 2022-04-08 RX ADMIN — FUROSEMIDE 40 MG: 40 TABLET ORAL at 08:29

## 2022-04-08 ASSESSMENT — PAIN SCALES - GENERAL: PAINLEVEL_OUTOF10: 0

## 2022-04-08 NOTE — CARE COORDINATION
4/8/2022  Social Work Discharge Planning:Pt has no needs at discharge. Will return home. Independent.  Electronically signed by NAN Jean on 4/8/2022 at 9:09 AM

## 2022-04-08 NOTE — PROGRESS NOTES
Internal Medicine  Progress Note  Beverly Richey MD     Subjective:     Patient is alert. Patient reports OK. Tolerating diet well no other c/o. Scheduled Meds:   metoprolol succinate  50 mg Oral Daily    sodium chloride flush  5-40 mL IntraVENous 2 times per day    [Held by provider] amLODIPine  10 mg Oral Nightly    apixaban  5 mg Oral BID    doxazosin  4 mg Oral BID    furosemide  40 mg Oral QAM    potassium chloride  20 mEq Oral QAM    sertraline  25 mg Oral QAM     Continuous Infusions:   sodium chloride       PRN Meds:perflutren lipid microspheres, sodium chloride flush, sodium chloride, acetaminophen, ondansetron **OR** ondansetron, LORazepam    Objective:      Physical Exam:  Vitals:   BP (!) 140/90   Pulse 70   Temp 98.1 °F (36.7 °C) (Oral)   Resp 16   Ht 6' (1.829 m)   Wt 232 lb 6 oz (105.4 kg)   SpO2 94%   BMI 31.52 kg/m²   Orthostatic BPs and Heart Rates:     I/O's    Intake/Output Summary (Last 24 hours) at 4/8/2022 0645  Last data filed at 4/7/2022 1404  Gross per 24 hour   Intake 500 ml   Output --   Net 500 ml     Exam:  General appearance: alert, appears stated age and cooperative  Head: Normocephalic, without obvious abnormality, atraumatic  Eyes: negative  Throat: normal findings: lips normal without lesions  Neck: no adenopathy, no carotid bruit, no JVD and supple, symmetrical, trachea midline  Back: negative  Lungs: clear to auscultation bilaterally  Chest wall: no tenderness  Heart: regular rate and rhythm, S1, S2 normal, no murmur, click, rub or gallop  Abdomen: soft, non-tender; bowel sounds normal; no masses,  no organomegaly  Extremities: extremities normal, atraumatic, no cyanosis or edema  Pulses: 2+ and symmetric  Skin: Skin color, texture, turgor normal. No rashes or lesions  Lymph nodes: Cervical, supraclavicular, and axillary nodes normal.  Neurologic: Grossly normal      Imaging     Chest  Xray:  No results found for this or any previous visit.     Results for orders placed during the hospital encounter of 04/06/22    XR CHEST PORTABLE    Narrative  EXAMINATION:  ONE XRAY VIEW OF THE CHEST    4/6/2022 10:03 am    COMPARISON:  03/25/2022    HISTORY:  ORDERING SYSTEM PROVIDED HISTORY: afib  TECHNOLOGIST PROVIDED HISTORY:  Reason for exam:->afib    FINDINGS:  The heart is enlarged. There is no mediastinal widening. There are no  findings of CHF or pneumonia. There is no pleural effusion. Impression  Cardiomegaly. There are no findings of failure or pneumonia.       Additional Imaging:  none    Lab Review   Recent Results (from the past 24 hour(s))   Basic Metabolic Panel w/ Reflex to MG    Collection Time: 04/07/22  9:38 AM   Result Value Ref Range    Sodium 142 132 - 146 mmol/L    Potassium reflex Magnesium 3.7 3.5 - 5.0 mmol/L    Chloride 106 98 - 107 mmol/L    CO2 24 22 - 29 mmol/L    Anion Gap 12 7 - 16 mmol/L    Glucose 105 (H) 74 - 99 mg/dL    BUN 12 6 - 23 mg/dL    CREATININE 1.1 0.7 - 1.2 mg/dL    GFR Non-African American >60 >=60 mL/min/1.73    GFR African American >60     Calcium 9.3 8.6 - 10.2 mg/dL   CBC    Collection Time: 04/07/22  9:38 AM   Result Value Ref Range    WBC 4.1 (L) 4.5 - 11.5 E9/L    RBC 4.36 3.80 - 5.80 E12/L    Hemoglobin 13.5 12.5 - 16.5 g/dL    Hematocrit 40.3 37.0 - 54.0 %    MCV 92.4 80.0 - 99.9 fL    MCH 31.0 26.0 - 35.0 pg    MCHC 33.5 32.0 - 34.5 %    RDW 13.9 11.5 - 15.0 fL    Platelets 622 776 - 433 E9/L    MPV 10.1 7.0 - 12.0 fL   EKG 12 Lead    Collection Time: 04/07/22  1:13 PM   Result Value Ref Range    Ventricular Rate 74 BPM    Atrial Rate 74 BPM    P-R Interval 196 ms    QRS Duration 114 ms    Q-T Interval 452 ms    QTc Calculation (Bazett) 501 ms    P Axis 1 degrees    R Axis -36 degrees    T Axis 6 degrees     Assessment:     Active Problems:    Primary hypertension    Atrial fibrillation with rapid ventricular response (HCC)    Persistent atrial fibrillation (HCC)    Acute heart failure (HCC)    Moderate obesity  Resolved Problems:    * No resolved hospital problems.  *      Plan:   Discharge today  Dietrich Rinne, MD  4/8/2022  6:45 AM

## 2022-04-08 NOTE — TELEPHONE ENCOUNTER
----- Message from Grace Najera MD sent at 4/8/2022  8:19 AM EDT -----  The patient underwent successful direct current cardioversion following transesophageal echocardiographic guidance during hospitalization.   I am not sure if he already has a follow-up appointment arranged with Rakel Barrera but if not would suggest he be reassessed within the next month

## 2022-04-08 NOTE — PROGRESS NOTES
The patient echocardiogram reviewed with findings of a significantly dilated left ventricular chamber with severe left ventricular systolic dysfunction estimated ejection fraction of 30% with stage II diastolic dysfunction. Biatrial enlargement is present with mild mitral regurgitation. At the time of assessment, these findings were discussed with the patient as well as reinforcing optimization of medical management as previously outlined in clinical correspondence and needs of appropriate monitoring and optimization of medical therapy in addition to reassessment of systolic function following correction of his atrial arrhythmias.

## 2022-04-08 NOTE — PROGRESS NOTES
INPATIENT CARDIOLOGY FOLLOW-UP    Name: Loren Shetty    Age: 64 y.o. Date of Admission: 4/6/2022  9:25 AM    Date of Service: 4/8/2022    Chief Complaint: Follow-up for acute superimposed upon chronic diastolic heart failure, paroxysmal atrial fibrillation, hypertension, moderate obesity, hypokalemia    Interim History: The patient presently has noted symptomatic improvement following successful restoration of sinus rhythm with his transesophageal echocardiogram suggesting normal left ventricular systolic function. He remains hemodynamically stable with present evidence of hypokalemia. Review of Systems: The remainder of a complete multisystem review including consitutional, central nervous, respiratory, circulatory, gastrointestinal, genitourinary, endocrinologic, hematologic, musculoskeletal and psychiatric are negative.     Problem List:  Patient Active Problem List   Diagnosis    History of cardiac catheterization    Ileus ([facility] [address]    Renal cell cancer ([facility] [address]    Primary hypertension    Hypokalemia    Atrial fibrillation with rapid ventricular response (HCC)    Persistent atrial fibrillation (HCC)    Acute heart failure (HCC)    Moderate obesity       Allergies:  No Known Allergies    Current Medications:  Current Facility-Administered Medications   Medication Dose Route Frequency Provider Last Rate Last Admin    perflutren lipid microspheres (DEFINITY) injection 1.65 mg  1.5 mL IntraVENous ONCE PRN Anjum Day MD        metoprolol succinate (TOPROL XL) extended release tablet 50 mg  50 mg Oral Daily Olga Lewis MD   50 mg at 04/07/22 0813    sodium chloride flush 0.9 % injection 5-40 mL  5-40 mL IntraVENous 2 times per day Olga Lewis MD   10 mL at 04/07/22 2143    sodium chloride flush 0.9 % injection 5-40 mL  5-40 mL IntraVENous PRN Olga Lewis MD        0.9 % sodium chloride infusion   IntraVENous PRN Olga Lewis MD        acetaminophen (TYLENOL) tablet 650 mg  650 mg Oral Q4H PRN Sierra Aguirre MD   650 mg at 04/07/22 2048    ondansetron (ZOFRAN-ODT) disintegrating tablet 4 mg  4 mg Oral Q8H PRN Sierra Aguirre MD        Or    [facility] injection 4 mg  4 mg IntraVENous Q6H PRN Sierra Aguirre MD       [facility] [place] by provider] amLODIPine (NORVASC) tablet 10 mg  10 mg Oral Nightly Sierra Aguirre MD        apixaban [facility]) tablet 5 mg  5 mg Oral BID Sierra Aguirre MD   5 mg at 04/07/22 2035    doxazosin (CARDURA) tablet 4 mg  4 mg Oral BID Sierra Aguirre MD   4 mg at 04/07/22 2034    furosemide (LASIX) tablet 40 mg  40 mg Oral SHELBI Aguirre MD   40 mg at 04/07/22 6101    LORazepam (ATIVAN) tablet 0.5 mg  0.5 mg Oral Q12H PRN Sierra Aguirre MD   0.5 mg at 04/07/22 2035    potassium chloride (KLOR-CON M) extended release tablet 20 mEq  20 mEq Oral SHELBI Aguirre MD        sertraline (ZOLOFT) tablet 25 mg  25 mg Oral SHELBI Aguirre MD          sodium chloride         Physical Exam:  BP (!) 140/90   Pulse 70   Temp 98.1 °F (36.7 °C) (Oral)   Resp 16   Ht 6' (1.829 m)   Wt 232 lb 6 oz (105.4 kg)   SpO2 94%   BMI 31.52 kg/m²   Weight change: -1.6 oz (-0.045 kg)  Wt Readings from Last 3 Encounters:   04/08/22 232 lb 6 oz (105.4 kg)   04/01/22 238 lb (108 kg)   03/25/22 235 lb (106.6 kg)     The patient is awake, alert and in no discomfort or distress. No gross musculoskeletal deformity is present. No significant skin or nail changes are present. Gross examination of head, eyes, nose and throat are negative. Jugular venous pressure is normal and no carotid bruits are present. Normal respiratory effort is noted with no accessory muscle usage present. Lung fields are clear to ascultation. Cardiac examination is notable for a regular rate and rhythm with no palpable thrill. No gallop rhythm or cardiac murmur are identified. A benign abdominal examination is present with no masses or organomegaly. Intact pulses are present throughout all extremities and trivial pretibial edema is present.  No focal neurologic deficits are present. Intake/Output:    Intake/Output Summary (Last 24 hours) at 4/8/2022 0759  Last data filed at 4/7/2022 1404  Gross per 24 hour   Intake 500 ml   Output --   Net 500 ml     No intake/output data recorded. Laboratory Tests:  Lab Results   Component Value Date    CREATININE 1.1 04/08/2022    BUN 17 04/08/2022     04/08/2022    K 3.3 (L) 04/08/2022     04/08/2022    CO2 26 04/08/2022     No results for input(s): CKTOTAL, CKMB in the last 72 hours.     Invalid input(s): TROPONONI  No results found for: BNP  Lab Results   Component Value Date    WBC 4.1 04/07/2022    RBC 4.36 04/07/2022    HGB 13.5 04/07/2022    HCT 40.3 04/07/2022    MCV 92.4 04/07/2022    MCH 31.0 04/07/2022    MCHC 33.5 04/07/2022    RDW 13.9 04/07/2022     04/07/2022    MPV 10.1 04/07/2022     Recent Labs     04/06/22  0945   ALKPHOS 95   ALT 49*   AST 24   PROT 7.1   BILITOT 1.0   LABALBU 4.3     No results found for: MG  Lab Results   Component Value Date    PROTIME 11.2 03/25/2022    INR 1.0 03/25/2022     Lab Results   Component Value Date    TSH 1.350 04/06/2022     No components found for: CHLPL  No results found for: TRIG  No results found for: HDL  No results found for: [facility]    Cardiac Tests:  ECG: A resting electrocardiogram post cardioversion reviewed at time of evaluation demonstrates evidence of sinus rhythm with occasional ventricular ectopy with left atrial enlargement, left axis deviation and left ventricular hypertrophy  Telemetry findings reviewed: sinus rhythm with a transient episode of paroxysmal atrial fibrillation, no new tachy/bradyarrhythmias overnight  Last Echocardiogram: A transesophageal echocardiogram performed in conjunction with cardioversion demonstrated evidence of a normal-sized left ventricular chamber with borderline concentric left ventricular hypertrophy and normal left ventricular systolic function with mild left atrial enlargement and no significant valvular pathology      ASSESSMENT / PLAN: On a clinical basis, the patient appears compensated from cardiovascular standpoint with subjective improvement following the restoration of sinus rhythm. Presently, this is been maintained with a transient episode of paroxysmal atrial fibrillation in the face of hypokalemia with present supplementation in order to reduce risk of recurrent arrhythmias. Based on suboptimal definition of the degree of left ventricular hypertrophy from his transesophageal echocardiogram, a limited echocardiogram will be obtained and reviewed upon completion with present plans of alteration of his medical regimen with the discontinuation of his amlodipine and doxazosin in favor of an angiotensin receptor blocker to further optimize cardiac performance. Rescheduling of his sleep assessment will be essential with needs of management of anticipated obstructive sleep apnea as appropriate to assist diastolic cardiac performance as well as to reduce risk of recurrent atrial arrhythmias. Continued careful monitoring of his volume status will be necessary following the restoration of sinus rhythm with potential alteration of diuretic dosing in addition to that of renal function and electrolytes following optimization of his medical regimen. In addition in view of his history of prostatic hypertrophy if necessary with discontinuation of his alpha antagonist, further adjustment of medical management including the initiation of tamsulosin or an alternative treatment may be indicated and deferred to primary care ongoing aggressive risk factor modification of blood pressure and serum lipids will remain essential to reducing risk of future atherosclerotic development.   Following the completion of his echocardiogram and its review, he is otherwise compensated from a cardiovascular standpoint for discharge with arrangements made for follow-up with his primary cardiologist, April Lockwood on an outpatient basis. At the time of his assessment his condition including potential plans of optimization of his medical regimen as above were discussed with his primary care physician. Note: This report was completed utilizing computer voice recognition software. Every effort has been made to ensure accuracy, however; inadvertent computerized transcription errors may be present. Rosemary Portillo.  Chase Galvez, [address] [place] [address]

## 2022-04-09 LAB
EKG ATRIAL RATE: 74 BPM
EKG P AXIS: 1 DEGREES
EKG P-R INTERVAL: 196 MS
EKG Q-T INTERVAL: 452 MS
EKG QRS DURATION: 114 MS
EKG QTC CALCULATION (BAZETT): 501 MS
EKG R AXIS: -36 DEGREES
EKG T AXIS: 6 DEGREES
EKG VENTRICULAR RATE: 74 BPM

## 2022-04-09 PROCEDURE — 93010 ELECTROCARDIOGRAM REPORT: CPT | Performed by: INTERNAL MEDICINE

## 2022-04-12 NOTE — TELEPHONE ENCOUNTER
Contacted patient to schedule HFU per Dr. Joanne Hartley. They will be out of town and will contact our office upon return to schedule in June 2022.

## 2022-04-13 ENCOUNTER — TELEPHONE (OUTPATIENT)
Dept: CARDIOLOGY CLINIC | Age: 77
End: 2022-04-13

## 2022-04-14 ENCOUNTER — OFFICE VISIT (OUTPATIENT)
Dept: CARDIOLOGY CLINIC | Age: 77
End: 2022-04-14
Payer: MEDICARE

## 2022-04-14 VITALS
SYSTOLIC BLOOD PRESSURE: 156 MMHG | DIASTOLIC BLOOD PRESSURE: 78 MMHG | RESPIRATION RATE: 18 BRPM | HEART RATE: 65 BPM | BODY MASS INDEX: 30.88 KG/M2 | WEIGHT: 228 LBS | HEIGHT: 72 IN

## 2022-04-14 DIAGNOSIS — I48.91 ATRIAL FIBRILLATION WITH RAPID VENTRICULAR RESPONSE (HCC): ICD-10-CM

## 2022-04-14 DIAGNOSIS — I50.20 HFREF (HEART FAILURE WITH REDUCED EJECTION FRACTION) (HCC): Primary | ICD-10-CM

## 2022-04-14 PROCEDURE — 93000 ELECTROCARDIOGRAM COMPLETE: CPT | Performed by: INTERNAL MEDICINE

## 2022-04-14 PROCEDURE — 99215 OFFICE O/P EST HI 40 MIN: CPT | Performed by: NURSE PRACTITIONER

## 2022-04-14 ASSESSMENT — EJECTION FRACTION
EF_VALUE: 30
EF_SOURCE: TEE

## 2022-04-14 NOTE — PROGRESS NOTES
[id number] [facility] Cardiology  Office Visit         Reason for Visit: Heart Failure    Primary Cardiologist: Dr. Diamond Arellano         History of Present Illness:     Mr. Gaurang Park is a 64-year-old male with a PMHx of chronic HFrEF, presumed nonischemic cardiomyopathy, PAF, HTN, possible POLINA, obesity and history of renal cell carcinoma status post right nephrectomy. Interim History:    · PCP 3/25/2022 for intermittent episodes of palpitations, decreased exercise tolerance (prior to 3/20/2022 patient was very active / rides a stationary bike / rowing machine up to 6 times per week) and new onset of orthopnea    · [facility] on 3/25/2022 he was found to be in AF with RVR and was started on Eliquis 5 mg BID and Toprol-XL was increased to 50 mg QD. He was discharged to home and was seen during initial consultation with Dr. Diamond Arellano on 4/1/2022. EKG showed AF with  bpm, note to be mildly hypervolemic (proBNP 2400) and was started on Lasix 40 mg QD with potassium supplementation. He was recommended for sleep study and was scheduled for a MERT/DCCV on 4/19/2022 with Dr. Diamond Arellano. · Outpatient visit with Dr. Diamond Arellano (4/1/2022) since he was discharged from the hospital on 3/25/2022 he has been gradually getting more weak, fatigued, decreased appetite, orthopnea without PND, abdominal bloating, early satiety with minimal lower extremity edema. He occasionally has palpitations but denies having any chest pain. He occasionally has a \"hollow sensation\" with ambulating approximately 50 feet prior. He was started on Lasix 40 mg daily and has had no improved urine output. · Represented to the ED on 4/6/2022 due to recurrent symptoms. Was noted to be in atrial fibrillation with RVR. He underwent successful MERT/DCCV to sinus rhythm. During hospitalization he also underwent limited echocardiogram that demonstrated LVEF 30%, stage II DD preserved RV size and function, mild MR, dilated ascending aorta (4.2 cm).     He presents today in hospital follow-up, since discharge from the hospital he has been compliant with all of his current cardiac medications. He has noted elevated blood pressure since discharge but denies any palpitations, headaches, blurred vision or strokelike symptoms. He has chronic dyspnea with exertion, shortness of breath, or decline in overall functional capacity. He denies orthopnea, PND, nocturnal cough or hemoptysis. He denies abdominal distention or bloating, early satiety, anorexia/change in appetite, unintentional weight loss. He does not lower extremity edema. He denies exertional lightheadedness. He denies palpitations, syncope or near syncope. Review of systems is negative for chest pain, pressure, discomfort. When ambulating on an incline, He does not leg claudication. History is negative for neurological symptoms including transient loss of vision, asymmetric weakness, aphasia, dysphasia, numbness, tingling. Patient Active Problem List    Diagnosis Date Noted    Persistent atrial fibrillation ([facility]     Acute heart failure (HCC)     Moderate obesity     Atrial fibrillation with rapid ventricular response ([facility] 04/06/2022    Hypokalemia 07/28/2014    Ileus ([facility] 07/26/2014    Renal cell cancer ([facility] 07/26/2014     replace inactive diagnosis      Primary hypertension 07/26/2014    History of cardiac catheterization 06/25/2014     A. Cath in [address] [place] [address]) \"minimal plaque\" in LAD   EF 55%       Past Medical History:    1. HTN  2. Arthritis   3. Hx of solid renal mass s/p renal cell carcinoma s/p right nephrectomy (7/2014)  4. Hx of Hernia repair   5. Hx of \"minimal plaque\" in LAD, LVEF 55% on [facility] (1996, Dr. Joe Pham). 6. Newly diagnosed AF with RVR (duration unknown, captured on EKG 3/25/2022). ? [id number] score at least 4-5. On Eliquis. Scheduled for MERT/DCCV on 4/19/2022.   7. Obesity: BMI 32.28 kg/m2  8. Suspected POLINA  9. Former smoker: quit in 1971  10. Depression   11.  Left popliteal cyst on US DUP LE: 3/25/2022. Past Medical History:   Diagnosis Date    Arthritis     Environmental allergies     grass mold trees    Hypertension     Renal cell carcinoma ([facility] July 2014    s/p right nephrectomy           Past Surgical History:   Procedure Laterality Date    COLONOSCOPY      DIAGNOSTIC CARDIAC CATH LAB PROCEDURE      HERNIA REPAIR      JOINT REPLACEMENT      beto hips    KNEE CARTILAGE SURGERY  years ago    right    PILONIDAL CYST EXCISION      TESTICLE REMOVAL  1995    TOTAL NEPHRECTOMY  7/22/14    right radical nephrectomy laparoscopic hand assisted with cystoscopy right ureteral catheter insertion              No Known Allergies      Outpatient Medications Marked as Taking for the 4/14/22 encounter (Office Visit) with JOSE Colon CNP   Medication Sig Dispense Refill    valsartan (DIOVAN) 80 MG tablet Take 1 tablet by mouth daily (Patient taking differently: Take 160 mg by mouth daily Takes 80mg in the am and 80mg in the pm) 30 tablet 3    apixaban (ELIQUIS) 5 MG TABS tablet Take 5 mg by mouth 2 times daily      furosemide (LASIX) 40 MG tablet Take 40 mg by mouth every morning      potassium chloride (KLOR-CON M) 20 MEQ extended release tablet Take 20 mEq by mouth every morning      sertraline (ZOLOFT) 25 MG tablet Take 25 mg by mouth every morning       LORazepam (ATIVAN) 1 MG tablet Take 0.5-1 mg by mouth every 12 hours as needed for Anxiety.        APPLE CIDER VINEGAR PO Take 1 tablet by mouth every morning      metoprolol succinate (TOPROL XL) 25 MG extended release tablet Take 2 tablets by mouth every morning 14 tablet 1    meloxicam (MOBIC) 15 MG tablet Take 7.5 mg by mouth 2 times daily          Review of Systems:   Cardiac: As per HPI  General: No fever, chills, rigors  Pulmonary: As per HPI  HEENT: No visual disturbances, difficult swallowing  GI: No nausea, vomiting, abdominal pain  : No dysuria or hematuria  Endocrine: No thyroid disease or diabetes  Musculoskeletal: SIMON x 4, no focal motor deficits  Skin: Intact, no rashes  Neuro/Psych: No headache or seizures          Weights: Wt Readings from Last 3 Encounters:   04/14/22 228 lb (103.4 kg)   04/08/22 232 lb 6 oz (105.4 kg)   04/01/22 238 lb (108 kg)           Physical Examination:     BP (!) 156/78   Pulse 65   Resp 18   Ht 6' (1.829 m)   Wt 228 lb (103.4 kg)   BMI 30.92 kg/m²     CONSTITUTIONAL: Alert and oriented times 3, no acute distress and cooperative to examination with proper mood and affect. SKIN: Skin color, texture, turgor normal. No rashes or lesions. LYMPH: no cervical nodes, no inguinal nodes  HEENT: Head is normocephalic, atraumatic. EOMI, PERRLA. NECK: Supple, symmetrical, trachea midline, no adenopathy, thyroid symmetric, not enlarged and no tenderness, skin normal.  CHEST/LUNGS: chest symmetric with normal A/P diameter, normal respiratory rate and rhythm, lungs clear to auscultation without wheezes, rales or rhonchi. No accessory muscle use. Scars None   CARDIOVASCULAR: Heart sounds are normal.  Regular rate and rhythm without murmur, gallop or rub. Normal S1 and S2. . Carotid and femoral pulses 2+/4 and equal bilaterally. ABDOMEN: Normal shape. No and Laparoscopic scar(s) present. Normal bowel sounds. No bruits. soft, nondistended, no masses or organomegaly. no evidence of hernia. Percussion: Normal without hepatosplenomegally. Tenderness: absent. RECTAL: deferred, not clinically indicated  NEUROLOGIC: There are no focalizing motor or sensory deficits. CN II-XII are grossly intact. Papi Confer EXTREMITIES: no cyanosis, no clubbing and no edema. All the following diagnostics were personally reviewed and interpreted by me.        LAB DATA:     4/7/2022 09:38 4/8/2022 06:25   Sodium 142 142   Potassium 3.7 3.3 (L)   Chloride 106 106   CO2 24 26   BUN,BUNPL 12 17   Creatinine 1.1 1.1   Anion Gap 12 10   GFR Non-African American >60 >60   GFR  >60 >60 GLUCOSE, FASTING, (H) 93   CALCIUM, SERUM, 104985 9.3 9.1   WBC 4.1 (L)    RBC 4.36    Hemoglobin Quant 13.5    Hematocrit 40.3    MCV 92.4    MCH 31.0    MCHC 33.5    MPV 10.1    RDW 13.9    Platelet Count 100        IMAGING:    CXR (4/6/2022)  FINDINGS:  The heart is enlarged.  There is no mediastinal widening.  There are no  findings of CHF or pneumonia.  There is no pleural effusion. Impression  Cardiomegaly. Maris Eric are no findings of failure or pneumonia. CARDIAC TESTING:    MERT (4/7/2022)   Summary   Left ventricle is grossly normal in size. Borderline left ventricular concentric hypertrophy noted. Normal left ventricle systolic function. Mildly dilated left atrium. Mild spontaneous echo contrast was present in LA cavity. Mild mitral regurgitation is present. Mild aortic regurgitation is noted. Limited TTE (4/8/2022)   Summary: Moderate-to-severe dilated left ventricle. No regional wall motion abnormalities seen with severe generalized   hypokinesis. Severely reduced left ventricular systolic dysfunction. There is doppler evidence of stage II diastolic dysfunction. The left atrium is severely dilated. Moderately enlarged left atrial size. Mild centrally directed mitral regurgitation. Mildly dilated aortic root. Dilation of the ascending aorta. EKG  Sinus Rhythm  Occasional PAC     LAFB  LVH  ST depression + Negative T-waves       ASSESSMENT:  1. Chronic HFrEF  2. ACC stage C / NYHA class II  3. Euvolemic   4. New cardiomyopathy, possible arrthymia induced. Remote nonischemic cath   5. LVEF 30%, LVEDD 7.3, LVMI 155  6. PAF - OAC with Eliquis   7. HTN  8. Hx of renal cell carcinoma s/p right nephrectomy  9. Possible POLINA  10. Obesity         PLAN:  1. Stop Valsartan     2. Start Entresto 49/51 mg twice daily     3. Continue rest of current cardiac medications     4.  Referral to the CHF clinic in [facility] in 1 week  - once we review visit will continue to adjust your heart failure medications    5. Following with sleep medicine     6. Consider ischemia evaluation    7. Follow up with Dr. Alla Dang in June 8. Weigh yourself daily    -Stay Hydrated    -Diet should sodium restricted to 2 grams    -Again watch your daily weight trends and if you gain water weight please follow below instructions.    -If you gain 3-5 pounds in 2-3 days OR notice that you are retaining fluid in anyway just like you did before then take an extra dose of your water pill (furosemide/Lasix OR bumetanide/Bumex OR Demadex/Torsemide) every day until you lose the weight or feel better.     -If you notice that you have taken more than 2 extra doses in 1 week then please call and let us know. -If at any time you feel that you are retaining fluid, your medications are not working, or you feel ill in anyway, then please call us for either same day appointment or the next day, and for instructions. Our goal is to keep you out of the emergency room and the hospital and we have ways to do it. You just need to call us in a timely manner.     -If you become sick for other reasons, and notice that you are not urinating as much, the urine is very dark, you have significant diarrhea or vomiting, then please DO NOT take your water pill and CALL US immediately.     Buster Cotto APRN-CNP  [id number] [facility] Card[facility]

## 2022-04-14 NOTE — PATIENT INSTRUCTIONS
1. Stop valsartan     2. Start Entresto 49/51 mg twice daily     3. Continue rest of current cardiac medications     4. Referral to the CHF clinic in [facility] in 1 week  - once we review visit will continue to adjust your heart failure medications    5. Following with sleep medicine     5. Follow up with Dr. Calista Davis in June - will consider ischemic evaluation    6. Weigh yourself daily    -Stay Hydrated    -Diet should sodium restricted to 2 grams    -Again watch your daily weight trends and if you gain water weight please follow below instructions.    -If you gain 3-5 pounds in 2-3 days OR notice that you are retaining fluid in anyway just like you did before then take an extra dose of your water pill (furosemide/Lasix OR bumetanide/Bumex OR Demadex/Torsemide) every day until you lose the weight or feel better.     -If you notice that you have taken more than 2 extra doses in 1 week then please call and let us know. -If at any time you feel that you are retaining fluid, your medications are not working, or you feel ill in anyway, then please call us for either same day appointment or the next day, and for instructions. Our goal is to keep you out of the emergency room and the hospital and we have ways to do it. You just need to call us in a timely manner.     -If you become sick for other reasons, and notice that you are not urinating as much, the urine is very dark, you have significant diarrhea or vomiting, then please DO NOT take your water pill and CALL US immediately.

## 2022-04-21 ENCOUNTER — HOSPITAL ENCOUNTER (OUTPATIENT)
Dept: OTHER | Age: 77
Setting detail: THERAPIES SERIES
Discharge: HOME | End: 2022-04-21
Payer: MEDICARE

## 2022-04-21 VITALS
HEART RATE: 72 BPM | OXYGEN SATURATION: 95 % | SYSTOLIC BLOOD PRESSURE: 154 MMHG | RESPIRATION RATE: 18 BRPM | DIASTOLIC BLOOD PRESSURE: 86 MMHG

## 2022-04-21 LAB
ANION GAP SERPL CALCULATED.3IONS-SCNC: 12 MMOL/L (ref 7–16)
BUN BLDV-MCNC: 14 MG/DL (ref 6–23)
CALCIUM SERPL-MCNC: 9.4 MG/DL (ref 8.6–10.2)
CHLORIDE BLD-SCNC: 102 MMOL/L (ref 98–107)
CO2: 24 MMOL/L (ref 22–29)
CREAT SERPL-MCNC: 1.2 MG/DL (ref 0.7–1.2)
GFR AFRICAN AMERICAN: >60
GFR NON-AFRICAN AMERICAN: 58 ML/MIN/1.73
GLUCOSE BLD-MCNC: 138 MG/DL (ref 74–99)
POTASSIUM SERPL-SCNC: 4 MMOL/L (ref 3.5–5)
PRO-BNP: 1713 PG/ML (ref 0–450)
SODIUM BLD-SCNC: 138 MMOL/L (ref 132–146)

## 2022-04-21 PROCEDURE — 36415 COLL VENOUS BLD VENIPUNCTURE: CPT

## 2022-04-21 PROCEDURE — 99204 OFFICE O/P NEW MOD 45 MIN: CPT

## 2022-04-21 PROCEDURE — 80048 BASIC METABOLIC PNL TOTAL CA: CPT

## 2022-04-21 PROCEDURE — 83880 ASSAY OF NATRIURETIC PEPTIDE: CPT

## 2022-04-21 ASSESSMENT — PAIN SCALES - GENERAL: PAINLEVEL_OUTOF10: 0

## 2022-04-21 NOTE — PROGRESS NOTES
Congestive Heart Failure [address] [facility] [place] [address]   1945          Referring Provider: Evelyn Richards  Primary Care Physician: Favian Mehta  Cardiologist: Yvette Bae  Nephrologist:         History of Present Illness:     Wilfred Bryan is a 64 y.o. male with a history of HFrEF, most recent EF 30%. Patient Story:    He does not  have dyspnea with exertion, shortness of breath, or decline in overall functional capacity. He does not have orthopnea, PND, nocturnal cough or hemoptysis. He does not have abdominal distention or bloating, early satiety, anorexia/change in appetite. He does has a good urinary response to  oral diuretic. He does have  lower extremity edema. He denies lightheadedness, dizziness. He denies palpitations, syncope or near syncope. He does not complain of chest pain, pressure, discomfort. No Known Allergies      No outpatient medications have been marked as taking for the 4/21/22 encounter [facility] Encounter) with [facility] CHF ROOM 1. Prior to Visit Medications    Medication Sig Taking? Authorizing Provider   sacubitril-valsartan (ENTRESTO) 49-51 MG per tablet Take 1 tablet by mouth 2 times daily  JOSE Kaplan - CNP   apixaban (ELIQUIS) 5 MG TABS tablet Take 5 mg by mouth 2 times daily  Historical Provider, MD   furosemide (LASIX) 40 MG tablet Take 40 mg by mouth every morning  Historical Provider, MD   potassium chloride (KLOR-CON M) 20 MEQ extended release tablet Take 20 mEq by mouth every morning  Historical Provider, MD   sertraline (ZOLOFT) 25 MG tablet Take 25 mg by mouth every morning   Historical Provider, MD   LORazepam (ATIVAN) 1 MG tablet Take 0.5-1 mg by mouth every 12 hours as needed for Anxiety.    Historical Provider, MD   APPLE CIDER VINEGAR PO Take 1 tablet by mouth every morning  Historical Provider, MD   metoprolol succinate (TOPROL XL) 25 MG extended release tablet Take 2 tablets by mouth every morning  Arnoldo ESCOBAR DO Lauren   meloxicam (MOBIC) 15 MG tablet Take 7.5 mg by mouth 2 times daily   Historical Provider, MD           Guideline directed medical:  ARNI/ACE I/ARB: Yes  Beta blocker: Yes  Aldosterone antagonist:  No        Physical Examination:     BP (!) 154/86   Pulse 72   Resp 18   SpO2 95%     Assessment  Charting Type: Shift assessment (CHf clinic )    Neurological  Level of Consciousness: Alert (0)  Orientation Level: Oriented X4  Speech: Clear         HEENT (Head, Ears, Eyes, Nose, & Throat)  HEENT (WDL): Exceptions to WDL  Right Eye: Impaired vision  Left Eye: Impaired vision                   Cardiac  Cardiac Regularity: Regular  Cardiac Rhythm: Sinus rhythm    Rhythm Interpretation  Pulse: 72         Gastrointestinal  Abdominal (WDL): Within Defined Limits  Abdomen Inspection: Rounded,Soft  RUQ Bowel Sounds: Active  LUQ Bowel Sounds: Active  RLQ Bowel Sounds: Active  LLQ Bowel Sounds: Active          Bowel Sounds  RUQ Bowel Sounds: Active  LUQ Bowel Sounds: Active  RLQ Bowel Sounds: Active  LLQ Bowel Sounds:  Active    Peripheral Vascular  Peripheral Vascular (WDL): Within Defined Limits  Edema: Right lower extremity,Left lower extremity  RLE Edema: Trace  LLE Edema: Trace                   Genitourinary  Genitourinary (WDL): Within Defined Limits    Psychosocial  Psychosocial (WDL): Within Defined Limits    Pain Assessment  Pain Assessment: None - Denies Pain  Pain Level: 0                   Pulse: 72                     LAB DATA:    Last 3 BMP      Sodium (mmol/L)   Date Value   04/21/2022 138   04/08/2022 142   04/07/2022 142     Potassium (mmol/L)   Date Value   04/21/2022 4.0   04/08/2022 3.3 (L)     Potassium reflex Magnesium (mmol/L)   Date Value   04/07/2022 3.7   04/06/2022 3.8   03/25/2022 3.6     Chloride (mmol/L)   Date Value   04/21/2022 102   04/08/2022 106   04/07/2022 106     CO2 (mmol/L)   Date Value   04/21/2022 24   04/08/2022 26   04/07/2022 24     BUN (mg/dL)   Date Value   04/21/2022 14   04/08/2022 17   04/07/2022 12     Glucose (mg/dL)   Date Value   04/21/2022 138 (H)   04/08/2022 93   04/07/2022 105 (H)     Calcium (mg/dL)   Date Value   04/21/2022 9.4   04/08/2022 9.1   04/07/2022 9.3       Last 3 BNP       Pro-BNP (pg/mL)   Date Value   04/21/2022 1,713 (H)   04/06/2022 2,358 (H)   03/25/2022 2,382 (H)          CBC: No results for input(s): WBC, HGB, PLT in the last 72 hours. BMP:    Recent Labs     04/21/22  0851      K 4.0      CO2 24   BUN 14   CREATININE 1.2   GLUCOSE 138*     Hepatic: No results for input(s): AST, ALT, ALB, BILITOT, ALKPHOS in the last 72 hours. Troponin: No results for input(s): TROPONINI in the last 72 hours. BNP: No results for input(s): BNP in the last 72 hours. Lipids: No results for input(s): CHOL, HDL in the last 72 hours. Invalid input(s): LDLCALCU  INR: No results for input(s): INR in the last 72 hours. WEIGHTS:    Wt Readings from Last 3 Encounters:   04/14/22 228 lb (103.4 kg)   04/08/22 232 lb 6 oz (105.4 kg)   04/01/22 238 lb (108 kg)         TELEMETRY:  Cardiac Regularity: Regular  Cardiac Rhythm/Interpretation: SR        ASSESSMENT:First visit with CHF clinic today. Patient presented with new diagnosis AFIB and chf. Discussed with patient and wife the purpose of CHF clinic and importance of daily weights and doing a self check every day to monitor for changes. Went over the three heart failure zones and to call cardiologist if in yellow zone immediately to prevent any further decline. Patient has a working scale. Patient is agreeable to future CHF clinic visits. Next 3 consecutive weekly appointments made today so that patient has a total of 4 visits within first 30 days. Srikanth Tristan is evolemic with stable weights   Sleeps on 3 pillows. Urinates 6x a day and 2x at night, clear.       Interventions completed this visit:  IV diuretics given no  Lab work obtained yes, BNP/BMP   Reviewed currently prescribed medications with patient, educated on importance of compliance and answered any questions regarding their medication  Educated on signs and symptoms of HF  Educated on low sodium diet    PLAN:  Scheduled to follow up in CHF clinic on   Future Appointments   Date Time Provider Kristen Anders   4/28/2022  7:30 AM [facility] ROOM 1 [facility]   5/6/2022  8:30 AM [facility] ROOM 1 [facility]   5/12/2022  8:30 AM [facility] ROOM 1 [facility]   5/23/2022  9:00 PM [facility] BEDROOM 5 Casey Nguyen 95     Given clinic phone number and aware of signs and symptoms to call with any HF change in symptoms.

## 2022-04-21 NOTE — RESULT ENCOUNTER NOTE
Labs and CHF clinic note reviewed  Vitals at CHF clinic: /86   Pulse 72     Current GDMT:  Entresto 49/51 mg BID  Toprol 50 mg daily   Lasix 40 mg daily  Potassium 20 meq daily     Please have him increase Entresto to high dose  Follow up labs at CHF clinic next week as scheduled    Thank you

## 2022-04-21 NOTE — PLAN OF CARE
Problem: Activity:  Goal: Capacity to carry out activities will improve  Description: Capacity to carry out activities will improve  Outcome: Progressing     Problem:  Activity:  Goal: Will verbalize the importance of balancing activity with adequate rest periods  Description: Will verbalize the importance of balancing activity with adequate rest periods  Outcome: Progressing     Problem: Cardiac:  Goal: Hemodynamic stability will improve  Description: Hemodynamic stability will improve  Outcome: Progressing     Problem: Cardiac:  Goal: Ability to maintain an adequate cardiac output will improve  Description: Ability to maintain an adequate cardiac output will improve  Outcome: Progressing

## 2022-04-24 NOTE — H&P
Internal Medicine  Progress Note  Jerry Laird MD     Subjective:      Patient is alert. Patient reports OK nervous. Tolerating diet well no other c/o.     Scheduled Meds:  Scheduled Medications    metoprolol succinate  50 mg Oral Daily    sodium chloride flush  5-40 mL IntraVENous 2 times per day    [Held by provider] amLODIPine  10 mg Oral Nightly    apixaban  5 mg Oral BID    doxazosin  4 mg Oral BID    furosemide  40 mg Oral QAM    potassium chloride  20 mEq Oral QAM    sertraline  25 mg Oral QAM         Continuous Infusions:  Infusions Meds    sodium chloride           PRN Meds:  PRN Medications   sodium chloride flush, sodium chloride, acetaminophen, ondansetron **OR** ondansetron, LORazepam        Objective:      Physical Exam:  Vitals:   /79   Pulse 118   Temp 98.6 °F (37 °C) (Oral)   Resp 18   Ht 6' (1.829 m)   Wt 232 lb 1.6 oz (105.3 kg)   SpO2 93%   BMI 31.48 kg/m²   Orthostatic BPs and Heart Rates:  I/O's     Intake/Output Summary (Last 24 hours) at 4/7/2022 0735  Last data filed at 4/6/2022 1507      Gross per 24 hour   Intake --   Output 500 ml   Net -500 ml      Exam:  General appearance: alert, appears stated age and cooperative  Head: Normocephalic, without obvious abnormality, atraumatic  Eyes: conjunctivae/corneas clear. PERRL, EOM's intact. Fundi benign. Throat: lips, mucosa, and tongue normal; teeth and gums normal  Neck: no adenopathy, no carotid bruit, no JVD, supple, symmetrical, trachea midline and thyroid not enlarged, symmetric, no tenderness/mass/nodules  Back: symmetric, no curvature. ROM normal. No CVA tenderness.   Lungs: clear to auscultation bilaterally  Chest wall: no tenderness  Heart: irregularly irregular rhythm  Abdomen: soft, non-tender; bowel sounds normal; no masses,  no organomegaly  Extremities: extremities normal, atraumatic, no cyanosis or edema  Pulses: 2+ and symmetric  Skin: Skin color, texture, turgor normal. No rashes or lesions  Lymph nodes: Cervical, supraclavicular, and axillary nodes normal.  Neurologic: Grossly normal        Imaging     Chest  Xray:  No results found for this or any previous visit.     Results for orders placed during the hospital encounter of 04/06/22     XR CHEST PORTABLE     Narrative  EXAMINATION:  ONE XRAY VIEW OF THE CHEST     4/6/2022 10:03 am     COMPARISON:  03/25/2022     HISTORY:  ORDERING SYSTEM PROVIDED HISTORY: afib  TECHNOLOGIST PROVIDED HISTORY:  Reason for exam:->afib     FINDINGS:  The heart is enlarged. There is no mediastinal widening. There are no  findings of CHF or pneumonia. There is no pleural effusion.     Impression  Cardiomegaly.   There are no findings of failure or pneumonia.        Additional Imaging:  none     Lab Review   Recent Results         Recent Results (from the past 24 hour(s))   EKG 12 Lead     Collection Time: 04/06/22  9:30 AM   Result Value Ref Range     Ventricular Rate 121 BPM     Atrial Rate 129 BPM     QRS Duration 110 ms     Q-T Interval 302 ms     QTc Calculation (Bazett) 428 ms     R Axis -34 degrees     T Axis 46 degrees   CBC with Auto Differential     Collection Time: 04/06/22  9:45 AM   Result Value Ref Range     WBC 4.1 (L) 4.5 - 11.5 E9/L     RBC 4.39 3.80 - 5.80 E12/L     Hemoglobin 13.2 12.5 - 16.5 g/dL     Hematocrit 40.4 37.0 - 54.0 %     MCV 92.0 80.0 - 99.9 fL     MCH 30.1 26.0 - 35.0 pg     MCHC 32.7 32.0 - 34.5 %     RDW 13.9 11.5 - 15.0 fL     Platelets 063 371 - 526 E9/L     MPV 10.1 7.0 - 12.0 fL     Neutrophils % 71.6 43.0 - 80.0 %     Immature Granulocytes % 0.0 0.0 - 5.0 %     Lymphocytes % 17.7 (L) 20.0 - 42.0 %     Monocytes % 9.0 2.0 - 12.0 %     Eosinophils % 1.2 0.0 - 6.0 %     Basophils % 0.5 0.0 - 2.0 %     Neutrophils Absolute 2.95 1.80 - 7.30 E9/L     Immature Granulocytes # 0.00 E9/L     Lymphocytes Absolute 0.73 (L) 1.50 - 4.00 E9/L     Monocytes Absolute 0.37 0.10 - 0.95 E9/L     Eosinophils Absolute 0.05 0.05 - 0.50 E9/L     Basophils Absolute 0.02 0.00 - 0.20 E9/L   Comprehensive Metabolic Panel w/ Reflex to MG     Collection Time: 04/06/22  9:45 AM   Result Value Ref Range     Sodium 137 132 - 146 mmol/L     Potassium reflex Magnesium 3.8 3.5 - 5.0 mmol/L     Chloride 102 98 - 107 mmol/L     CO2 24 22 - 29 mmol/L     Anion Gap 11 7 - 16 mmol/L     Glucose 127 (H) 74 - 99 mg/dL     BUN 13 6 - 23 mg/dL     CREATININE 1.1 0.7 - 1.2 mg/dL     GFR Non-African American >60 >=60 mL/min/1.73     GFR African American >60       Calcium 9.3 8.6 - 10.2 mg/dL     Total Protein 7.1 6.4 - 8.3 g/dL     Albumin 4.3 3.5 - 5.2 g/dL     Total Bilirubin 1.0 0.0 - 1.2 mg/dL     Alkaline Phosphatase 95 40 - 129 U/L     ALT 49 (H) 0 - 40 U/L     AST 24 0 - 39 U/L   Troponin     Collection Time: 04/06/22  9:45 AM   Result Value Ref Range     Troponin, High Sensitivity 23 (H) 0 - 11 ng/L   Brain Natriuretic Peptide     Collection Time: 04/06/22  9:45 AM   Result Value Ref Range     Pro-BNP 2,358 (H) 0 - 450 pg/mL   Troponin     Collection Time: 04/06/22 12:08 PM   Result Value Ref Range     Troponin, High Sensitivity 21 (H) 0 - 11 ng/L   COVID-19, Rapid     Collection Time: 04/06/22  2:27 PM     Specimen: Nasopharyngeal Swab   Result Value Ref Range     SARS-CoV-2, NAAT Not Detected Not Detected   TSH     Collection Time: 04/06/22  2:27 PM   Result Value Ref Range     TSH 1.350 0.270 - 4.200 uIU/mL         Assessment:      Active Problems:    Atrial fibrillation with rapid ventricular response (HCC)  Resolved Problems:    * No resolved hospital problems. *        Plan:    For cardioversion  Likely home tomorrow  Juancho Gamble MD  4/7/2022  6:33 AM

## 2022-04-26 ENCOUNTER — TELEPHONE (OUTPATIENT)
Dept: CARDIOLOGY CLINIC | Age: 77
End: 2022-04-26

## 2022-04-26 DIAGNOSIS — Z79.899 MEDICATION DOSE INCREASED: ICD-10-CM

## 2022-04-26 DIAGNOSIS — I50.20 HFREF (HEART FAILURE WITH REDUCED EJECTION FRACTION) (HCC): Primary | ICD-10-CM

## 2022-04-26 NOTE — TELEPHONE ENCOUNTER
----- Message from JOSE Purvis CNP sent at 4/21/2022  3:43 PM EDT -----  Labs and CHF clinic note reviewed  Vitals at CHF clinic: /86   Pulse 72     Current GDMT:  Entresto 49/51 mg BID  Toprol 50 mg daily   Lasix 40 mg daily  Potassium 20 meq daily     Please have him increase Entresto to high dose  Follow up labs at CHF clinic next week as scheduled    Thank you

## 2022-04-26 NOTE — TELEPHONE ENCOUNTER
[address] [place] [address], [address] [place] [address] [phone]  script went through for $42 through [employer]      158 / 92  bp today     I have reviewed the provider's instructions with the patient, answering all questions to his satisfaction.       Future Appointments   Date Time Provider Kristen Anders   4/28/2022  7:30 AM [facility] ROOM 1 [facility]   5/6/2022  8:30 AM [facility] ROOM 1 [facility]   5/12/2022  8:30 AM [facility] ROOM 1 [facility]

## 2022-04-28 ENCOUNTER — HOSPITAL ENCOUNTER (OUTPATIENT)
Dept: OTHER | Age: 77
Setting detail: THERAPIES SERIES
Discharge: HOME | End: 2022-04-28
Payer: MEDICARE

## 2022-04-28 VITALS
HEART RATE: 73 BPM | RESPIRATION RATE: 18 BRPM | WEIGHT: 224 LBS | DIASTOLIC BLOOD PRESSURE: 98 MMHG | SYSTOLIC BLOOD PRESSURE: 162 MMHG | OXYGEN SATURATION: 96 % | BODY MASS INDEX: 30.38 KG/M2

## 2022-04-28 LAB
ANION GAP SERPL CALCULATED.3IONS-SCNC: 12 MMOL/L (ref 7–16)
BUN BLDV-MCNC: 15 MG/DL (ref 6–23)
CALCIUM SERPL-MCNC: 9.3 MG/DL (ref 8.6–10.2)
CHLORIDE BLD-SCNC: 102 MMOL/L (ref 98–107)
CO2: 23 MMOL/L (ref 22–29)
CREAT SERPL-MCNC: 1.1 MG/DL (ref 0.7–1.2)
GFR AFRICAN AMERICAN: >60
GFR NON-AFRICAN AMERICAN: >60 ML/MIN/1.73
GLUCOSE BLD-MCNC: 121 MG/DL (ref 74–99)
POTASSIUM SERPL-SCNC: 3.5 MMOL/L (ref 3.5–5)
PRO-BNP: 1885 PG/ML (ref 0–450)
SODIUM BLD-SCNC: 137 MMOL/L (ref 132–146)

## 2022-04-28 PROCEDURE — 83880 ASSAY OF NATRIURETIC PEPTIDE: CPT

## 2022-04-28 PROCEDURE — 36415 COLL VENOUS BLD VENIPUNCTURE: CPT

## 2022-04-28 PROCEDURE — 2580000003 HC RX 258

## 2022-04-28 PROCEDURE — 99214 OFFICE O/P EST MOD 30 MIN: CPT

## 2022-04-28 PROCEDURE — 80048 BASIC METABOLIC PNL TOTAL CA: CPT

## 2022-04-28 PROCEDURE — 6360000002 HC RX W HCPCS

## 2022-04-28 PROCEDURE — 96374 THER/PROPH/DIAG INJ IV PUSH: CPT

## 2022-04-28 RX ORDER — SODIUM CHLORIDE 0.9 % (FLUSH) 0.9 %
10 SYRINGE (ML) INJECTION ONCE
Status: COMPLETED | OUTPATIENT
Start: 2022-04-28 | End: 2022-04-28

## 2022-04-28 RX ORDER — SODIUM CHLORIDE 0.9 % (FLUSH) 0.9 %
SYRINGE (ML) INJECTION
Status: COMPLETED
Start: 2022-04-28 | End: 2022-04-28

## 2022-04-28 RX ORDER — FUROSEMIDE 10 MG/ML
INJECTION INTRAMUSCULAR; INTRAVENOUS
Status: COMPLETED
Start: 2022-04-28 | End: 2022-04-28

## 2022-04-28 RX ORDER — FUROSEMIDE 10 MG/ML
40 INJECTION INTRAMUSCULAR; INTRAVENOUS ONCE
Status: COMPLETED | OUTPATIENT
Start: 2022-04-28 | End: 2022-04-28

## 2022-04-28 RX ADMIN — Medication 10 ML: at 07:56

## 2022-04-28 RX ADMIN — FUROSEMIDE 40 MG: 10 INJECTION, SOLUTION INTRAVENOUS at 07:57

## 2022-04-28 RX ADMIN — SODIUM CHLORIDE, PRESERVATIVE FREE 10 ML: 5 INJECTION INTRAVENOUS at 07:56

## 2022-04-28 RX ADMIN — FUROSEMIDE 40 MG: 10 INJECTION INTRAMUSCULAR; INTRAVENOUS at 07:57

## 2022-04-28 NOTE — PLAN OF CARE
Problem: Chronic Conditions and Co-morbidities  Goal: Patient's chronic conditions and co-morbidity symptoms are monitored and maintained or improved  Outcome: Progressing   CHF-continue plan of care

## 2022-04-28 NOTE — RESULT ENCOUNTER NOTE
Labs and CHF clinic note reviewed  Vitals at CHF clinic: /98   Pulse 73     Current GDMT:  Entresto 97/103 mg BID  Toprol 50 mg daily   Lasix 40 mg daily  Potassium 20 meq daily      Start spironolactone 25 mg daily   Stop potassium supplement  Follow up labs at CHF clinic as scheduled next week    Thank you

## 2022-04-28 NOTE — PROGRESS NOTES
Congestive Heart Failure [address] [facility] [place]   1945          Referring Provider: [facility]  Primary Care Physician: Anup Moses  Cardiologist: Eli Acuña  Nephrologist:         History of Present Illness:     Rosezetta Najjar is a 76 y.o. male with a history of HFrEF, most recent EF 30%. Patient Story:    He does not  have dyspnea with exertion, shortness of breath, or decline in overall functional capacity. He does not have orthopnea, PND, nocturnal cough or hemoptysis. He does not have abdominal distention or bloating, early satiety, anorexia/change in appetite. He does has a good urinary response to  oral diuretic. He does have pitting lower extremity edema. He denies lightheadedness, dizziness. He denies palpitations, syncope or near syncope. He does not complain of chest pain, pressure, discomfort. No Known Allergies      No outpatient medications have been marked as taking for the 4/28/22 encounter [facility] Encounter) with [facility] ROOM 1. Prior to Visit Medications    Medication Sig Taking? Authorizing Provider   sacubitril-valsartan (ENTRESTO)  MG per tablet Take 1 tablet by mouth 2 times daily  Rodrigo Blankenship APRN - CNP   apixaban (ELIQUIS) 5 MG TABS tablet Take 5 mg by mouth 2 times daily  Historical Provider, MD   furosemide (LASIX) 40 MG tablet Take 40 mg by mouth every morning  Historical Provider, MD   potassium chloride (KLOR-CON M) 20 MEQ extended release tablet Take 20 mEq by mouth every morning  Historical Provider, MD   sertraline (ZOLOFT) 25 MG tablet Take 25 mg by mouth every morning   Historical Provider, MD   LORazepam (ATIVAN) 1 MG tablet Take 0.5-1 mg by mouth every 12 hours as needed for Anxiety.    Historical Provider, MD   APPLE CIDER VINEGAR PO Take 1 tablet by mouth every morning  Historical Provider, MD   metoprolol succinate (TOPROL XL) 25 MG extended release tablet Take 2 tablets by mouth every morning  Myranda ESCOBAR DO Lauren   meloxicam (MOBIC) 15 MG tablet Take 7.5 mg by mouth 2 times daily   Historical Provider, MD           Guideline directed medical:  ARNI/ACE I/ARB: Yes  Beta blocker:   Yes  Aldosterone antagonist:  No        Physical Examination:     BP (!) 162/98   Pulse 73   Resp 18   Wt 224 lb (101.6 kg)   SpO2 96%   BMI 30.38 kg/m²     Assessment  Charting Type: Shift assessment (chf clinic)    Neurological  Level of Consciousness: Alert (0)              Respiratory  Respiratory Pattern: Regular  Respiratory Depth: Normal  Respiratory Quality/Effort: Unlabored  Chest Assessment: Chest expansion symmetrical  L Breath Sounds: Clear,Bilateral  R Breath Sounds: Clear,Bilateral              Cardiac  Cardiac Regularity: Regular  Heart Sounds: S1, S2  Cardiac Rhythm: Sinus rhythm    Rhythm Interpretation  Pulse: 73         Gastrointestinal  Abdominal (WDL): Exceptions to WDL  Abdomen Inspection: Rounded,Soft               Peripheral Vascular  Peripheral Vascular (WDL): Exceptions to WDL  Edema: Right lower extremity,Left lower extremity  RLE Edema: +1,Pitting  LLE Edema: Pitting,Trace                        Psychosocial  Psychosocial (WDL): Within Defined Limits                        Pulse: 73                     LAB DATA:    Last 3 BMP      Sodium (mmol/L)   Date Value   04/28/2022 137   04/21/2022 138   04/08/2022 142     Potassium (mmol/L)   Date Value   04/28/2022 3.5   04/21/2022 4.0   04/08/2022 3.3 (L)     Potassium reflex Magnesium (mmol/L)   Date Value   04/07/2022 3.7   04/06/2022 3.8   03/25/2022 3.6     Chloride (mmol/L)   Date Value   04/28/2022 102   04/21/2022 102   04/08/2022 106     CO2 (mmol/L)   Date Value   04/28/2022 23   04/21/2022 24   04/08/2022 26     BUN (mg/dL)   Date Value   04/28/2022 15   04/21/2022 14   04/08/2022 17     Glucose (mg/dL)   Date Value   04/28/2022 121 (H)   04/21/2022 138 (H)   04/08/2022 93     Calcium (mg/dL)   Date Value   04/28/2022 9.3   04/21/2022 9.4   04/08/2022 9.1       Last 3 BNP       Pro-BNP (pg/mL)   Date Value   04/28/2022 1,885 (H)   04/21/2022 1,713 (H)   04/06/2022 2,358 (H)          CBC: No results for input(s): WBC, HGB, PLT in the last 72 hours. BMP:    Recent Labs     04/28/22  0802      K 3.5      CO2 23   BUN 15   CREATININE 1.1   GLUCOSE 121*     Hepatic: No results for input(s): AST, ALT, ALB, BILITOT, ALKPHOS in the last 72 hours. Troponin: No results for input(s): TROPONINI in the last 72 hours. BNP: No results for input(s): BNP in the last 72 hours. Lipids: No results for input(s): CHOL, HDL in the last 72 hours. Invalid input(s): LDLCALCU  INR: No results for input(s): INR in the last 72 hours. WEIGHTS:    Wt Readings from Last 3 Encounters:   04/28/22 224 lb (101.6 kg)   04/14/22 228 lb (103.4 kg)   04/08/22 232 lb 6 oz (105.4 kg)         TELEMETRY:  Cardiac Regularity: Regular  Cardiac Rhythm/Interpretation: SR        ASSESSMENT:  Patient presented for second CHF visit today. Patient presented with daily weight log and medication list.  Patient has stable weight since last visit. On exam patient has pitting edema present in lower extremities. Last evening patient had chinese food. Educated patient on high concentrations of sodium in [place] food. Patient still reports urinating 6x a day and 2x at night. Patient has a follow up May 6th with CHF clinic.       Interventions completed this visit:  IV diuretics given: YES- IV lasix  Lab work obtained yes, BNP/BMP   Reviewed currently prescribed medications with patient, educated on importance of compliance and answered any questions regarding their medication  Educated on signs and symptoms of HF  Educated on low sodium diet    PLAN:  Scheduled to follow up in CHF clinic on   Future Appointments   Date Time Provider Kristen Anders   5/6/2022  8:30 AM [facility] ROOM 1 [facility]   5/12/2022  8:30 AM [facility] ROOM 1 [facility]     Given clinic phone number and aware of signs and symptoms to call with any HF change in symptoms.

## 2022-04-29 ENCOUNTER — TELEPHONE (OUTPATIENT)
Dept: CARDIOLOGY CLINIC | Age: 77
End: 2022-04-29

## 2022-04-29 DIAGNOSIS — I50.20 HFREF (HEART FAILURE WITH REDUCED EJECTION FRACTION) (HCC): Primary | ICD-10-CM

## 2022-04-29 RX ORDER — SPIRONOLACTONE 25 MG/1
25 TABLET ORAL DAILY
Qty: 90 TABLET | Refills: 1 | Status: SHIPPED
Start: 2022-04-29 | End: 2022-08-09

## 2022-04-29 NOTE — TELEPHONE ENCOUNTER
----- Message from JOSE Sykes CNP sent at 4/28/2022 11:38 AM EDT -----  Labs and CHF clinic note reviewed  Vitals at CHF clinic: /98   Pulse 73     Current GDMT:  Entresto 97/103 mg BID  Toprol 50 mg daily   Lasix 40 mg daily  Potassium 20 meq daily      Start spironolactone 25 mg daily   Stop potassium supplement  Follow up labs at CHF clinic as scheduled next week    Thank you

## 2022-04-29 NOTE — TELEPHONE ENCOUNTER
Use local rx for now,  [address] [place] [address], [address] [place] [address] [phone]      Severe sleep apnea. Wife will have [facility]  Fax results to [address] [place] [address]  Waiting for insurance approval for sleep apnea machine. I have reviewed the provider's instructions with the patient, answering all questions to his satisfaction.

## 2022-05-06 ENCOUNTER — HOSPITAL ENCOUNTER (OUTPATIENT)
Dept: OTHER | Age: 77
Setting detail: THERAPIES SERIES
Discharge: HOME | End: 2022-05-06
Payer: MEDICARE

## 2022-05-06 ENCOUNTER — TELEPHONE (OUTPATIENT)
Dept: CARDIOLOGY CLINIC | Age: 77
End: 2022-05-06

## 2022-05-06 VITALS
SYSTOLIC BLOOD PRESSURE: 138 MMHG | RESPIRATION RATE: 18 BRPM | OXYGEN SATURATION: 95 % | DIASTOLIC BLOOD PRESSURE: 88 MMHG | BODY MASS INDEX: 29.95 KG/M2 | HEART RATE: 72 BPM | WEIGHT: 220.8 LBS

## 2022-05-06 LAB
ANION GAP SERPL CALCULATED.3IONS-SCNC: 11 MMOL/L (ref 7–16)
BUN BLDV-MCNC: 17 MG/DL (ref 6–23)
CALCIUM SERPL-MCNC: 9.5 MG/DL (ref 8.6–10.2)
CHLORIDE BLD-SCNC: 102 MMOL/L (ref 98–107)
CO2: 24 MMOL/L (ref 22–29)
CREAT SERPL-MCNC: 1.1 MG/DL (ref 0.7–1.2)
GFR AFRICAN AMERICAN: >60
GFR NON-AFRICAN AMERICAN: >60 ML/MIN/1.73
GLUCOSE BLD-MCNC: 115 MG/DL (ref 74–99)
POTASSIUM SERPL-SCNC: 3.9 MMOL/L (ref 3.5–5)
PRO-BNP: 1589 PG/ML (ref 0–450)
SODIUM BLD-SCNC: 137 MMOL/L (ref 132–146)

## 2022-05-06 PROCEDURE — 99214 OFFICE O/P EST MOD 30 MIN: CPT

## 2022-05-06 PROCEDURE — 36415 COLL VENOUS BLD VENIPUNCTURE: CPT

## 2022-05-06 PROCEDURE — 80048 BASIC METABOLIC PNL TOTAL CA: CPT

## 2022-05-06 PROCEDURE — 83880 ASSAY OF NATRIURETIC PEPTIDE: CPT

## 2022-05-06 RX ORDER — METOPROLOL SUCCINATE 100 MG/1
100 TABLET, EXTENDED RELEASE ORAL DAILY
COMMUNITY
End: 2022-05-06 | Stop reason: CLARIF

## 2022-05-06 RX ORDER — METOPROLOL SUCCINATE 50 MG/1
50 TABLET, EXTENDED RELEASE ORAL 2 TIMES DAILY
COMMUNITY
End: 2022-05-23 | Stop reason: SDUPTHER

## 2022-05-06 NOTE — RESULT ENCOUNTER NOTE
Labs and CHF clinic note reviewed  Vitals at CHF clinic: /88   Pulse 72     Current GDMT:  Entresto 97/103 mg BID  Toprol 50 mg daily   Spironolactone 25 mg daily   Lasix 40 mg daily    Linda Rivera is on PTO.  Please have patient increase Toprol to 50 mg twice daily   Follow up labs and vitals at CHF clinic as scheduled next week  Anticipate starting SGLT2i    Thank you

## 2022-05-06 NOTE — PROGRESS NOTES
Congestive Heart Failure [address] [facility] [place] [address]   1945          Referring Provider: Sha Cordon  Primary Care Physician: Gama Shepard  Cardiologist: Low Marshall  Nephrologist:         History of Present Illness:     Luis Gilman is a 76 y.o. male with a history of HFrEF, most recent EF 30%. Patient Story:    He does not  have dyspnea with exertion, shortness of breath, or decline in overall functional capacity. He does not have orthopnea, PND, nocturnal cough or hemoptysis. He does not have abdominal distention or bloating, early satiety, anorexia/change in appetite. He does has a good urinary response to  oral diuretic. He does not have  lower extremity edema. He denies lightheadedness, dizziness. He denies palpitations, syncope or near syncope. He does not complain of chest pain, pressure, discomfort. No Known Allergies      No outpatient medications have been marked as taking for the 5/6/22 encounter [facility] Encounter) with [facility] ROOM 1. Prior to Visit Medications    Medication Sig Taking? Authorizing Provider   spironolactone (ALDACTONE) 25 MG tablet Take 1 tablet by mouth daily  JOSE Heller CNP   sacubitril-valsartan (ENTRESTO)  MG per tablet Take 1 tablet by mouth 2 times daily  JOSE Heller CNP   apixaban (ELIQUIS) 5 MG TABS tablet Take 5 mg by mouth 2 times daily  Historical Provider, MD   furosemide (LASIX) 40 MG tablet Take 40 mg by mouth every morning  Historical Provider, MD   sertraline (ZOLOFT) 25 MG tablet Take 25 mg by mouth every morning   Historical Provider, MD   LORazepam (ATIVAN) 1 MG tablet Take 0.5-1 mg by mouth every 12 hours as needed for Anxiety.    Historical Provider, MD   APPLE CIDER VINEGAR PO Take 1 tablet by mouth every morning  Historical Provider, MD   metoprolol succinate (TOPROL XL) 25 MG extended release tablet Take 2 tablets by mouth every morning  Michel ,    meloxicam (MOBIC) 15 MG tablet Take 7.5 mg by mouth 2 times daily   Historical Provider, MD           Guideline directed medical:  ARNI/ACE I/ARB: Yes  Beta blocker:   Yes  Aldosterone antagonist:  Yes        Physical Examination:     /88   Pulse 72   Resp 18   Wt 220 lb 12.8 oz (100.2 kg)   SpO2 95%   BMI 29.95 kg/m²     Assessment  Charting Type: Shift assessment (chf clinic)    Neurological  Level of Consciousness: Alert (0)         HEENT (Head, Ears, Eyes, Nose, & Throat)  HEENT (WDL): Exceptions to WDL  Right Eye: Impaired vision  Left Eye: Impaired vision    Respiratory  Respiratory Pattern: Regular  Respiratory Depth: Normal  L Breath Sounds: Clear  R Breath Sounds: Clear              Cardiac  Cardiac Regularity: Regular  Heart Sounds: S1, S2  Cardiac Rhythm: Sinus rhythm    Rhythm Interpretation  Pulse: 72         Gastrointestinal  Abdominal (WDL): Within Defined Limits  Abdomen Inspection: Rounded,Soft               Peripheral Vascular  Peripheral Vascular (WDL): Within Defined Limits  Edema: None                        Psychosocial  Psychosocial (WDL): Within Defined Limits                        Pulse: 72                     LAB DATA:    Last 3 BMP      Sodium (mmol/L)   Date Value   05/06/2022 137   04/28/2022 137   04/21/2022 138     Potassium (mmol/L)   Date Value   05/06/2022 3.9   04/28/2022 3.5   04/21/2022 4.0     Potassium reflex Magnesium (mmol/L)   Date Value   04/07/2022 3.7   04/06/2022 3.8   03/25/2022 3.6     Chloride (mmol/L)   Date Value   05/06/2022 102   04/28/2022 102   04/21/2022 102     CO2 (mmol/L)   Date Value   05/06/2022 24   04/28/2022 23   04/21/2022 24     BUN (mg/dL)   Date Value   05/06/2022 17   04/28/2022 15   04/21/2022 14     Glucose (mg/dL)   Date Value   05/06/2022 115 (H)   04/28/2022 121 (H)   04/21/2022 138 (H)     Calcium (mg/dL)   Date Value   05/06/2022 9.5   04/28/2022 9.3   04/21/2022 9.4       Last 3 BNP       Pro-BNP (pg/mL)   Date Value   05/06/2022 1,589 (H) 04/28/2022 1,885 (H)   04/21/2022 1,713 (H)          CBC: No results for input(s): WBC, HGB, PLT in the last 72 hours. BMP:    Recent Labs     05/06/22  0851      K 3.9      CO2 24   BUN 17   CREATININE 1.1   GLUCOSE 115*     Hepatic: No results for input(s): AST, ALT, ALB, BILITOT, ALKPHOS in the last 72 hours. Troponin: No results for input(s): TROPONINI in the last 72 hours. BNP: No results for input(s): BNP in the last 72 hours. Lipids: No results for input(s): CHOL, HDL in the last 72 hours. Invalid input(s): LDLCALCU  INR: No results for input(s): INR in the last 72 hours. WEIGHTS:    Wt Readings from Last 3 Encounters:   05/06/22 220 lb 12.8 oz (100.2 kg)   04/28/22 224 lb (101.6 kg)   04/14/22 228 lb (103.4 kg)         TELEMETRY:  Cardiac Regularity: Regular  Cardiac Rhythm/Interpretation: SR        ASSESSMENT:  Ama Clancy is evolemic with stable weights   He has started his spironolactone daily and tolerating well. He has been able to do more activity he says, including mowing the lawn in the past week. He is accompanied by wife Miguel Severance today. Interventions completed this visit:  IV diuretics given no  Lab work obtained yes, proBNP/BMP   Reviewed currently prescribed medications with patient, educated on importance of compliance and answered any questions regarding their medication  Educated on signs and symptoms of HF  Educated on low sodium diet    PLAN:  Scheduled to follow up in CHF clinic on   Future Appointments   Date Time Provider Kristen Anders   5/13/2022  7:45 AM [facility]   6/20/2022  8:30 AM Kyra Chambers MD [facility]     Given clinic phone number [phone] and aware of signs and symptoms to call with any HF change in symptoms. Skin normal color for race, warm, dry and intact. No evidence of rash.

## 2022-05-06 NOTE — TELEPHONE ENCOUNTER
----- Message from JOSE Mendoza CNP sent at 5/6/2022 11:34 AM EDT -----  Labs and CHF clinic note reviewed  Vitals at CHF clinic: /88   Pulse 72     Current GDMT:  Entresto 97/103 mg BID  Toprol 50 mg daily   Spironolactone 25 mg daily   Lasix 40 mg daily    Chandni Nair is on PTO.  Please have patient increase Toprol to 50 mg twice daily   Follow up labs and vitals at CHF clinic as scheduled next week  Anticipate starting SGLT2i    Thank you

## 2022-05-08 NOTE — DISCHARGE SUMMARY
Patient ID:  Shefali Islas  [id number]  76 y.o.  1945    Admit date: 4/6/2022    Discharge date and time: 4/8/2022 11:47 AM     Admission Diagnoses: Atrial fibrillation with rapid ventricular response [facility]) [I48.91]    Discharge Diagnoses:   Patient Active Problem List   Diagnosis    History of cardiac catheterization    [facility]us ([facility]    Renal cell cancer ([facility]    Primary hypertension    Hypokalemia    Atrial fibrillation with rapid ventricular response (HCC)    Persistent atrial fibrillation (HCC)    Acute heart failure (HCC)    Moderate obesity       Consults: cardiology    Procedures:   cardioversion    Hospital Course:  Pt admitted with rapid AF - cardioverted and was discharged at that point. Discharge Exam:  See progress note from today    Disposition: home    Condition at Discharge:  stable    Patient Instructions:   Discharge Medication List as of 4/8/2022 10:32 AM      START taking these medications    Details   valsartan (DIOVAN) 80 MG tablet Take 1 tablet by mouth daily, Disp-30 tablet, R-3Normal         CONTINUE these medications which have NOT CHANGED    Details   apixaban (ELIQUIS) 5 MG TABS tablet Take 5 mg by mouth 2 times dailyHistorical Med      furosemide (LASIX) 40 MG tablet Take 40 mg by mouth every morningHistorical Med      potassium chloride (KLOR-CON M) 20 MEQ extended release tablet Take 20 mEq by mouth every morningHistorical Med      sertraline (ZOLOFT) 25 MG tablet Take 25 mg by mouth every morning Historical Med      LORazepam (ATIVAN) 1 MG tablet Take 0.5-1 mg by mouth every 12 hours as needed for Anxiety.  Historical Med      APPLE CIDER VINEGAR PO Take 1 tablet by mouth every morningHistorical Med      metoprolol succinate (TOPROL XL) 25 MG extended release tablet Take 2 tablets by mouth every morning, Disp-14 tablet, R-1Print      meloxicam (MOBIC) 15 MG tablet Take 7.5 mg by mouth 2 times daily Historical Med         STOP taking these medications       doxazosin (CARDURA) 8 MG tablet Comments:   Reason for Stopping:         amLODIPine (NORVASC) 10 MG tablet Comments:   Reason for Stopping:             Activity: activity as tolerated  Diet: cardiac diet    Follow-up with Dr Grace Zhou in 1 week.     Note that over 30 minutes was spent in preparing discharge papers, discussing discharge with patient, medication review, etc.    Signed:  Melissa Chambers MD  5/8/2022  9:25 AM

## 2022-05-13 ENCOUNTER — HOSPITAL ENCOUNTER (OUTPATIENT)
Dept: OTHER | Age: 77
Setting detail: THERAPIES SERIES
Discharge: HOME | End: 2022-05-13
Payer: MEDICARE

## 2022-05-13 VITALS
OXYGEN SATURATION: 94 % | DIASTOLIC BLOOD PRESSURE: 90 MMHG | BODY MASS INDEX: 30.11 KG/M2 | WEIGHT: 222 LBS | SYSTOLIC BLOOD PRESSURE: 158 MMHG | HEART RATE: 63 BPM | RESPIRATION RATE: 18 BRPM

## 2022-05-13 LAB
ANION GAP SERPL CALCULATED.3IONS-SCNC: 10 MMOL/L (ref 7–16)
BUN BLDV-MCNC: 18 MG/DL (ref 6–23)
CALCIUM SERPL-MCNC: 9.5 MG/DL (ref 8.6–10.2)
CHLORIDE BLD-SCNC: 103 MMOL/L (ref 98–107)
CO2: 25 MMOL/L (ref 22–29)
CREAT SERPL-MCNC: 1.2 MG/DL (ref 0.7–1.2)
GFR AFRICAN AMERICAN: >60
GFR NON-AFRICAN AMERICAN: 58 ML/MIN/1.73
GLUCOSE BLD-MCNC: 100 MG/DL (ref 74–99)
POTASSIUM SERPL-SCNC: 4.1 MMOL/L (ref 3.5–5)
PRO-BNP: 1176 PG/ML (ref 0–450)
SODIUM BLD-SCNC: 138 MMOL/L (ref 132–146)

## 2022-05-13 PROCEDURE — 36415 COLL VENOUS BLD VENIPUNCTURE: CPT

## 2022-05-13 PROCEDURE — 83880 ASSAY OF NATRIURETIC PEPTIDE: CPT

## 2022-05-13 PROCEDURE — 99214 OFFICE O/P EST MOD 30 MIN: CPT

## 2022-05-13 PROCEDURE — 80048 BASIC METABOLIC PNL TOTAL CA: CPT

## 2022-05-13 ASSESSMENT — PAIN SCALES - GENERAL: PAINLEVEL_OUTOF10: 0

## 2022-05-13 NOTE — RESULT ENCOUNTER NOTE
Labs and CHF clinic note reviewed  Vitals at CHF clinic: /90   Pulse 63     Current GDMT:  Entresto 97/103 mg BID  Toprol 50 mg BID  Spironolactone 25 mg daily   Lasix 40 mg daily     Per CHF clinic note patient is going out of town  How long will he be gone?   Would like to start SGLT2i (Jardiance vs Farxiga 10 mg, whichever is covered by insurance)  However would need follow up blood work after starting

## 2022-05-13 NOTE — PROGRESS NOTES
Congestive Heart Failure [address] [facility] [place]   1945          Referring Provider: Mariela Martinez  Primary Care Physician: Radha Lowe  Cardiologist: Calista Davis  Nephrologist:         History of Present Illness:     Miri Ash is a 76 y.o. male with a history of HFrEF, most recent EF 30%. Patient Story:    He does not  have dyspnea with exertion, shortness of breath, or decline in overall functional capacity. He does not have orthopnea, PND, nocturnal cough or hemoptysis. He does not have abdominal distention or bloating, early satiety, anorexia/change in appetite. He does have a good urinary response to  oral diuretic. He does not have  lower extremity edema. He denies lightheadedness, dizziness. He denies palpitations, syncope or near syncope. He does not complain of chest pain, pressure, discomfort. No Known Allergies        Prior to Visit Medications    Medication Sig Taking? Authorizing Provider   metoprolol succinate (TOPROL XL) 50 MG extended release tablet Take 50 mg by mouth 2 times daily  Historical Provider, MD   spironolactone (ALDACTONE) 25 MG tablet Take 1 tablet by mouth daily  JOSE Vasquez CNP   sacubitril-valsartan (ENTRESTO)  MG per tablet Take 1 tablet by mouth 2 times daily  JOSE Vasquez CNP   apixaban (ELIQUIS) 5 MG TABS tablet Take 5 mg by mouth 2 times daily  Historical Provider, MD   furosemide (LASIX) 40 MG tablet Take 40 mg by mouth every morning  Historical Provider, MD   sertraline (ZOLOFT) 25 MG tablet Take 25 mg by mouth every morning   Historical Provider, MD   LORazepam (ATIVAN) 1 MG tablet Take 0.5-1 mg by mouth every 12 hours as needed for Anxiety.    Historical Provider, MD   APPLE CIDER VINEGAR PO Take 1 tablet by mouth every morning  Historical Provider, MD   meloxicam (MOBIC) 15 MG tablet Take 7.5 mg by mouth daily   Historical Provider, MD           Guideline directed medical:  ARNI/ACE I/ARB: Yes  Beta blocker: Yes  Aldosterone antagonist:  Yes        Physical Examination:     BP (!) 158/90   Pulse 63   Resp 18   Wt 222 lb (100.7 kg)   SpO2 94%   BMI 30.11 kg/m²     Assessment  Charting Type: Shift assessment (chf clinic)    Neurological  Level of Consciousness: Alert (0)              Respiratory  Chest Assessment: Chest expansion symmetrical              Cardiac  Cardiac Rhythm: Sinus rhythm    Rhythm Interpretation  Pulse: 63         Gastrointestinal  Abdominal (WDL): Within Defined Limits  Abdomen Inspection: Rounded,Soft               Peripheral Vascular  Peripheral Vascular (WDL): Within Defined Limits  Edema: None                   Genitourinary  Genitourinary (WDL): Within Defined Limits    Psychosocial  Psychosocial (WDL): Within Defined Limits    Pain Assessment  Pain Assessment: 0-10  Pain Level: 0                   Pulse: 63                     LAB DATA:    Last 3 BMP      Sodium (mmol/L)   Date Value   05/13/2022 138   05/06/2022 137   04/28/2022 137     Potassium (mmol/L)   Date Value   05/13/2022 4.1   05/06/2022 3.9   04/28/2022 3.5     Potassium reflex Magnesium (mmol/L)   Date Value   04/07/2022 3.7   04/06/2022 3.8   03/25/2022 3.6     Chloride (mmol/L)   Date Value   05/13/2022 103   05/06/2022 102   04/28/2022 102     CO2 (mmol/L)   Date Value   05/13/2022 25   05/06/2022 24   04/28/2022 23     BUN (mg/dL)   Date Value   05/13/2022 18   05/06/2022 17   04/28/2022 15     Glucose (mg/dL)   Date Value   05/13/2022 100 (H)   05/06/2022 115 (H)   04/28/2022 121 (H)     Calcium (mg/dL)   Date Value   05/13/2022 9.5   05/06/2022 9.5   04/28/2022 9.3       Last 3 BNP       Pro-BNP (pg/mL)   Date Value   05/13/2022 1,176 (H)   05/06/2022 1,589 (H)   04/28/2022 1,885 (H)          CBC: No results for input(s): WBC, HGB, PLT in the last 72 hours.   BMP:    Recent Labs     05/13/22  0810      K 4.1      CO2 25   BUN 18   CREATININE 1.2   GLUCOSE 100*     Hepatic: No results for input(s): AST, ALT, ALB, BILITOT, ALKPHOS in the last 72 hours. Troponin: No results for input(s): TROPONINI in the last 72 hours. BNP: No results for input(s): BNP in the last 72 hours. Lipids: No results for input(s): CHOL, HDL in the last 72 hours. Invalid input(s): LDLCALCU  INR: No results for input(s): INR in the last 72 hours. WEIGHTS:    Wt Readings from Last 3 Encounters:   05/13/22 222 lb (100.7 kg)   05/06/22 220 lb 12.8 oz (100.2 kg)   04/28/22 224 lb (101.6 kg)         TELEMETRY:  Cardiac Regularity: Regular  Cardiac Rhythm/Interpretation: SR        ASSESSMENT:  Lizbet Tobin is evolemic with stable weights  Patient states he has good response to oral diuretic and hydrates with 6-8 glasses of water a day           Interventions completed this visit:  IV diuretics given no  Lab work obtained yes, proBNP/BMP   Reviewed currently prescribed medications with patient, educated on importance of compliance and answered any questions regarding their medication  Educated on signs and symptoms of HF  Educated on low sodium diet    PLAN:  Scheduled to follow up in CHF clinic on   Future Appointments   Date Time Provider Kristen Anders   6/3/2022  8:30 AM [facility] ROOM 1 [facility]   6/20/2022  8:30 AM Shu Hutchins MD [address] [place] [address]     Given clinic phone number [phone] and aware of signs and symptoms to call with any HF change in symptoms.

## 2022-05-13 NOTE — PROGRESS NOTES
Congestive Heart Failure [address] [facility] [place]   1945          Referring Provider: Chuck Bell  Primary Care Physician: Jorge Palafox  Cardiologist: Kory Palacios  Nephrologist:         History of Present Illness:     Kinsey Castanon is a 76 y.o. male with a history of HFrEF, most recent EF 30%. Patient Story:    He does not  have dyspnea with exertion, shortness of breath, or decline in overall functional capacity. He does not have orthopnea, PND, nocturnal cough or hemoptysis. He does not have abdominal distention or bloating, early satiety, anorexia/change in appetite. He does has a good urinary response to  oral diuretic. He does not have  lower extremity edema. He denies lightheadedness, dizziness. He denies palpitations, syncope or near syncope. He does not complain of chest pain, pressure, discomfort. No Known Allergies        Prior to Visit Medications    Medication Sig Taking? Authorizing Provider   metoprolol succinate (TOPROL XL) 50 MG extended release tablet Take 50 mg by mouth 2 times daily  Historical Provider, MD   spironolactone (ALDACTONE) 25 MG tablet Take 1 tablet by mouth daily  JOSE Donahue CNP   sacubitril-valsartan (ENTRESTO)  MG per tablet Take 1 tablet by mouth 2 times daily  JOSE Donahue CNP   apixaban (ELIQUIS) 5 MG TABS tablet Take 5 mg by mouth 2 times daily  Historical Provider, MD   furosemide (LASIX) 40 MG tablet Take 40 mg by mouth every morning  Historical Provider, MD   sertraline (ZOLOFT) 25 MG tablet Take 25 mg by mouth every morning   Historical Provider, MD   LORazepam (ATIVAN) 1 MG tablet Take 0.5-1 mg by mouth every 12 hours as needed for Anxiety.    Historical Provider, MD   APPLE CIDER VINEGAR PO Take 1 tablet by mouth every morning  Historical Provider, MD   meloxicam (MOBIC) 15 MG tablet Take 7.5 mg by mouth daily   Historical Provider, MD           Guideline directed medical:  ARNI/ACE I/ARB: Yes  Beta blocker: Yes  Aldosterone antagonist:  Yes        Physical Examination:     BP (!) 158/90   Pulse 63   Resp 18   Wt 222 lb (100.7 kg)   SpO2 94%   BMI 30.11 kg/m²     Assessment  Charting Type: Shift assessment (chf clinic)    Neurological  Level of Consciousness: Alert (0)              Respiratory  Chest Assessment: Chest expansion symmetrical              Cardiac  Cardiac Rhythm: Sinus rhythm    Rhythm Interpretation  Pulse: 63         Gastrointestinal  Abdominal (WDL): Within Defined Limits  Abdomen Inspection: Rounded,Soft               Peripheral Vascular  Peripheral Vascular (WDL): Within Defined Limits  Edema: None                   Genitourinary  Genitourinary (WDL): Within Defined Limits    Psychosocial  Psychosocial (WDL): Within Defined Limits    Pain Assessment  Pain Assessment: 0-10  Pain Level: 0                   Pulse: 63                     LAB DATA:    Last 3 BMP      Sodium (mmol/L)   Date Value   05/06/2022 137   04/28/2022 137   04/21/2022 138     Potassium (mmol/L)   Date Value   05/06/2022 3.9   04/28/2022 3.5   04/21/2022 4.0     Potassium reflex Magnesium (mmol/L)   Date Value   04/07/2022 3.7   04/06/2022 3.8   03/25/2022 3.6     Chloride (mmol/L)   Date Value   05/06/2022 102   04/28/2022 102   04/21/2022 102     CO2 (mmol/L)   Date Value   05/06/2022 24   04/28/2022 23   04/21/2022 24     BUN (mg/dL)   Date Value   05/06/2022 17   04/28/2022 15   04/21/2022 14     Glucose (mg/dL)   Date Value   05/06/2022 115 (H)   04/28/2022 121 (H)   04/21/2022 138 (H)     Calcium (mg/dL)   Date Value   05/06/2022 9.5   04/28/2022 9.3   04/21/2022 9.4       Last 3 BNP       Pro-BNP (pg/mL)   Date Value   05/06/2022 1,589 (H)   04/28/2022 1,885 (H)   04/21/2022 1,713 (H)          CBC: No results for input(s): WBC, HGB, PLT in the last 72 hours. BMP:    No results for input(s): NA, K, CL, CO2, BUN, CREATININE, GLUCOSE in the last 72 hours.   Hepatic: No results for input(s): AST, ALT, ALB, BILITOT, ALKPHOS in the last 72 hours. Troponin: No results for input(s): TROPONINI in the last 72 hours. BNP: No results for input(s): BNP in the last 72 hours. Lipids: No results for input(s): CHOL, HDL in the last 72 hours. Invalid input(s): LDLCALCU  INR: No results for input(s): INR in the last 72 hours. WEIGHTS:    Wt Readings from Last 3 Encounters:   05/13/22 222 lb (100.7 kg)   05/06/22 220 lb 12.8 oz (100.2 kg)   04/28/22 224 lb (101.6 kg)         TELEMETRY:  Cardiac Regularity: Regular  Cardiac Rhythm/Interpretation: SR        ASSESSMENT:  Wilfredo Del Cid is evolemic with stable weights He has been following 2gm sodium diet and continues to hydrate. He states he exercises 45 minutes everyday at times has left chest fullness fleeting and will discuss with Dr Alexis Carrasco . Tolerating Toprol XL 50mg BID however his B/P is 158/90 He is going out of town so wanted scheduled in June  . Interventions completed this visit:  IV diuretics given no  Lab work obtained yes, proBNP/BMP   Reviewed currently prescribed medications with patient, educated on importance of compliance and answered any questions regarding their medication  Educated on signs and symptoms of HF  Educated on low sodium diet    PLAN:  Scheduled to follow up in CHF clinic on   Future Appointments   Date Time Provider Kristen Anders   6/3/2022  8:30 AM [facility]ADD[facility] ROOM 1 [facility]   6/20/2022  8:30 AM Tyler Osman MD [address] [facility]     Given clinic phone number [phone] and aware of signs and symptoms to call with any HF change in symptoms.

## 2022-05-23 DIAGNOSIS — I50.20 HFREF (HEART FAILURE WITH REDUCED EJECTION FRACTION) (HCC): ICD-10-CM

## 2022-05-23 DIAGNOSIS — Z79.899 MEDICATION DOSE INCREASED: ICD-10-CM

## 2022-05-23 RX ORDER — METOPROLOL SUCCINATE 50 MG/1
50 TABLET, EXTENDED RELEASE ORAL 2 TIMES DAILY
Qty: 180 TABLET | Refills: 3 | Status: SHIPPED
Start: 2022-05-23 | End: 2022-07-14 | Stop reason: ALTCHOICE

## 2022-06-02 ENCOUNTER — HOSPITAL ENCOUNTER (OUTPATIENT)
Dept: OTHER | Age: 77
Setting detail: THERAPIES SERIES
Discharge: HOME | End: 2022-06-02
Payer: MEDICARE

## 2022-06-02 VITALS
RESPIRATION RATE: 18 BRPM | SYSTOLIC BLOOD PRESSURE: 158 MMHG | WEIGHT: 217 LBS | DIASTOLIC BLOOD PRESSURE: 86 MMHG | HEART RATE: 62 BPM | OXYGEN SATURATION: 96 % | BODY MASS INDEX: 29.43 KG/M2

## 2022-06-02 LAB
ANION GAP SERPL CALCULATED.3IONS-SCNC: 12 MMOL/L (ref 7–16)
BUN BLDV-MCNC: 17 MG/DL (ref 6–23)
CALCIUM SERPL-MCNC: 9.2 MG/DL (ref 8.6–10.2)
CHLORIDE BLD-SCNC: 100 MMOL/L (ref 98–107)
CO2: 22 MMOL/L (ref 22–29)
CREAT SERPL-MCNC: 1.1 MG/DL (ref 0.7–1.2)
GFR AFRICAN AMERICAN: >60
GFR NON-AFRICAN AMERICAN: >60 ML/MIN/1.73
GLUCOSE BLD-MCNC: 109 MG/DL (ref 74–99)
POTASSIUM SERPL-SCNC: 3.7 MMOL/L (ref 3.5–5)
PRO-BNP: 1061 PG/ML (ref 0–450)
SODIUM BLD-SCNC: 134 MMOL/L (ref 132–146)

## 2022-06-02 PROCEDURE — 36415 COLL VENOUS BLD VENIPUNCTURE: CPT

## 2022-06-02 PROCEDURE — 80048 BASIC METABOLIC PNL TOTAL CA: CPT

## 2022-06-02 PROCEDURE — 99214 OFFICE O/P EST MOD 30 MIN: CPT

## 2022-06-02 PROCEDURE — 83880 ASSAY OF NATRIURETIC PEPTIDE: CPT

## 2022-06-02 NOTE — PROGRESS NOTES
Congestive Heart Failure [address] [facility] [place]   1945          Referring Provider: Sha Cordon  Primary Care Physician: Gama Shepard  Cardiologist: Low Marshall  Nephrologist:         History of Present Illness:     Luis Gilman is a 76 y.o. male with a history of HFrEF, most recent EF 30%. Patient Story:    He does not  have dyspnea with exertion, shortness of breath, or decline in overall functional capacity. He does not have orthopnea, PND, nocturnal cough or hemoptysis. He does not have abdominal distention or bloating, early satiety, anorexia/change in appetite. He does have a good urinary response to  oral diuretic. He does not have  lower extremity edema. He denies lightheadedness, dizziness. He denies palpitations, syncope or near syncope. He does not complain of chest pain, pressure, discomfort. No Known Allergies        Prior to Visit Medications    Medication Sig Taking? Authorizing Provider   sacubitril-valsartan (ENTRESTO)  MG per tablet Take 1 tablet by mouth 2 times daily  JOSE Heller - CNP   metoprolol succinate (TOPROL XL) 50 MG extended release tablet Take 1 tablet by mouth 2 times daily  JOSE Heller - CNP   spironolactone (ALDACTONE) 25 MG tablet Take 1 tablet by mouth daily  JOSE Heller - CNP   apixaban (ELIQUIS) 5 MG TABS tablet Take 5 mg by mouth 2 times daily  Historical Provider, MD   furosemide (LASIX) 40 MG tablet Take 40 mg by mouth every morning  Historical Provider, MD   sertraline (ZOLOFT) 25 MG tablet Take 25 mg by mouth every morning   Historical Provider, MD   LORazepam (ATIVAN) 1 MG tablet Take 0.5-1 mg by mouth every 12 hours as needed for Anxiety.    Historical Provider, MD   APPLE CIDER VINEGAR PO Take 1 tablet by mouth every morning  Historical Provider, MD   meloxicam (MOBIC) 15 MG tablet Take 7.5 mg by mouth daily   Historical Provider, MD           Guideline directed medical:  ARNI/ACE I/ARB: Yes  Beta blocker:   Yes  Aldosterone antagonist:  Yes        Physical Examination:     BP (!) 158/86   Pulse 62   Resp 18   Wt 217 lb (98.4 kg)   SpO2 96%   BMI 29.43 kg/m²     Assessment  Charting Type: Shift assessment (chf clinic)    Neurological  Level of Consciousness: Alert (0)              Respiratory  Respiratory Pattern: Regular  Respiratory Depth: Normal  Respiratory Quality/Effort: Unlabored  Chest Assessment: Chest expansion symmetrical  L Breath Sounds: Clear  R Breath Sounds: Clear              Cardiac  Cardiac Regularity: Regular  Cardiac Rhythm: Sinus rhythm    Rhythm Interpretation  Heart Rate: 62         Gastrointestinal  Abdominal (WDL): Within Defined Limits  Abdomen Inspection: Rounded,Soft               Peripheral Vascular  Peripheral Vascular (WDL): Within Defined Limits  Edema: None  RLE Edema: Trace  LLE Edema: Trace                   Genitourinary  Genitourinary (WDL): Within Defined Limits    Psychosocial  Psychosocial (WDL): Within Defined Limits                        Heart Rate: 62                     LAB DATA:    Last 3 BMP      Sodium (mmol/L)   Date Value   05/13/2022 138   05/06/2022 137   04/28/2022 137     Potassium (mmol/L)   Date Value   05/13/2022 4.1   05/06/2022 3.9   04/28/2022 3.5     Potassium reflex Magnesium (mmol/L)   Date Value   04/07/2022 3.7   04/06/2022 3.8   03/25/2022 3.6     Chloride (mmol/L)   Date Value   05/13/2022 103   05/06/2022 102   04/28/2022 102     CO2 (mmol/L)   Date Value   05/13/2022 25   05/06/2022 24   04/28/2022 23     BUN (mg/dL)   Date Value   05/13/2022 18   05/06/2022 17   04/28/2022 15     Glucose (mg/dL)   Date Value   05/13/2022 100 (H)   05/06/2022 115 (H)   04/28/2022 121 (H)     Calcium (mg/dL)   Date Value   05/13/2022 9.5   05/06/2022 9.5   04/28/2022 9.3       Last 3 BNP       Pro-BNP (pg/mL)   Date Value   05/13/2022 1,176 (H)   05/06/2022 1,589 (H)   04/28/2022 1,885 (H)          CBC: No results for input(s): WBC, HGB, PLT in the last 72 hours. BMP:    No results for input(s): NA, K, CL, CO2, BUN, CREATININE, GLUCOSE in the last 72 hours. Hepatic: No results for input(s): AST, ALT, ALB, BILITOT, ALKPHOS in the last 72 hours. Troponin: No results for input(s): TROPONINI in the last 72 hours. BNP: No results for input(s): BNP in the last 72 hours. Lipids: No results for input(s): CHOL, HDL in the last 72 hours. Invalid input(s): LDLCALCU  INR: No results for input(s): INR in the last 72 hours. WEIGHTS:    Wt Readings from Last 3 Encounters:   06/02/22 217 lb (98.4 kg)   05/13/22 222 lb (100.7 kg)   05/06/22 220 lb 12.8 oz (100.2 kg)         TELEMETRY:  Cardiac Regularity: Regular  Cardiac Rhythm/Interpretation: SR        ASSESSMENT:  Yaslistkolby Fields is evolemic with 5lb weight loss since prior visit. Patient states he has good response to oral diuretic and hydrates with 6-8 glasses of water a day. F/U with Dr. Olegario Ashton 6/20/22. Interventions completed this visit:  IV diuretics given no  Lab work obtained yes, proBNP/BMP   Reviewed currently prescribed medications with patient, educated on importance of compliance and answered any questions regarding their medication  Educated on signs and symptoms of HF  Educated on low sodium diet    PLAN:  Scheduled to follow up in CHF clinic on   Future Appointments   Date Time Provider Kristen Anders   6/20/2022  8:30 AM Olivia MondayMD [place] [facility]   7/14/2022 10:30 AM [facility] [address]     Given clinic phone number [phone] and aware of signs and symptoms to call with any HF change in symptoms.

## 2022-06-03 ENCOUNTER — HOSPITAL ENCOUNTER (OUTPATIENT)
Dept: OTHER | Age: 77
Setting detail: THERAPIES SERIES
Discharge: HOME | End: 2022-06-03
Payer: MEDICARE

## 2022-06-03 ENCOUNTER — TELEPHONE (OUTPATIENT)
Dept: CARDIOLOGY CLINIC | Age: 77
End: 2022-06-03

## 2022-06-03 DIAGNOSIS — Z79.899 NEW MEDICATION ADDED: ICD-10-CM

## 2022-06-03 DIAGNOSIS — I50.20 HFREF (HEART FAILURE WITH REDUCED EJECTION FRACTION) (HCC): Primary | ICD-10-CM

## 2022-06-03 NOTE — RESULT ENCOUNTER NOTE
Labs and CHF clinic note reviewed  Vitals at CHF clinic: BP (!) 158/86   Pulse 62     Current GDMT:  Entresto 97/103 mg BID  Toprol 50 mg BID  Spironolactone 25 mg daily   Lasix 40 mg daily    He is back in town, would like to start SGLT2i (Jardiance vs Farxiga 10 mg, whichever is covered by insurance)  Consider changing toprol to coreg if increased BP control needed    Follow up labs one week after starting SGLT2i    Thank you

## 2022-06-03 NOTE — TELEPHONE ENCOUNTER
jardiance is not on formulary  farxiga ordered. No PA generated    Called [facility]: [phone]  Mary pharmacist .  Will be  $42     Called [facility] home #   1:56 PM   Spoke with Duran Vale  and provided Latha George CNP's instructions with the patient wife, answering all questions to her satisfaction. They will do consult with pharmacist on med .      Elodia Li RN

## 2022-06-10 ENCOUNTER — HOSPITAL ENCOUNTER (OUTPATIENT)
Age: 77
Discharge: HOME | End: 2022-06-10
Payer: MEDICARE

## 2022-06-10 DIAGNOSIS — I50.20 HFREF (HEART FAILURE WITH REDUCED EJECTION FRACTION) (HCC): ICD-10-CM

## 2022-06-10 LAB
ANION GAP SERPL CALCULATED.3IONS-SCNC: 12 MMOL/L (ref 7–16)
BUN BLDV-MCNC: 15 MG/DL (ref 6–23)
CALCIUM SERPL-MCNC: 9.5 MG/DL (ref 8.6–10.2)
CHLORIDE BLD-SCNC: 100 MMOL/L (ref 98–107)
CO2: 24 MMOL/L (ref 22–29)
CREAT SERPL-MCNC: 1.2 MG/DL (ref 0.7–1.2)
GFR AFRICAN AMERICAN: >60
GFR NON-AFRICAN AMERICAN: 58 ML/MIN/1.73
GLUCOSE BLD-MCNC: 93 MG/DL (ref 74–99)
POTASSIUM SERPL-SCNC: 4.2 MMOL/L (ref 3.5–5)
PRO-BNP: 959 PG/ML (ref 0–450)
SODIUM BLD-SCNC: 136 MMOL/L (ref 132–146)

## 2022-06-10 PROCEDURE — 36415 COLL VENOUS BLD VENIPUNCTURE: CPT

## 2022-06-10 PROCEDURE — 83880 ASSAY OF NATRIURETIC PEPTIDE: CPT

## 2022-06-10 PROCEDURE — 80048 BASIC METABOLIC PNL TOTAL CA: CPT

## 2022-06-11 NOTE — RESULT ENCOUNTER NOTE
Follow up labs after start SGLT2i stable  Continue current management  Follow up labs in 1 week     Thank you

## 2022-06-13 ENCOUNTER — TELEPHONE (OUTPATIENT)
Dept: CARDIOLOGY CLINIC | Age: 77
End: 2022-06-13

## 2022-06-13 DIAGNOSIS — I50.20 HFREF (HEART FAILURE WITH REDUCED EJECTION FRACTION) (HCC): Primary | ICD-10-CM

## 2022-06-13 NOTE — TELEPHONE ENCOUNTER
Dr Elias Ferrell patient    I have reviewed the provider's  LAUREL Morgan CNP instructions with the patient's wife, answering all questions to her  satisfaction. Labs done last Friday. She will have him go this Friday for f/u labs (seeing Dr Elias Ferrell Monday and they are going camping this weekend)    bp per wife  Systolic 104 now (came down)   Tolerated farxiga.      Future Appointments   Date Time Provider Kristen Anders   6/20/2022  8:30 AM Ryland Fleischer, MD [facility]   [facility]

## 2022-06-13 NOTE — TELEPHONE ENCOUNTER
----- Message from JOSE Armstrong CNP sent at 6/11/2022  1:13 AM EDT -----  Follow up labs after start SGLT2i stable  Continue current management  Follow up labs in 1 week     Thank you

## 2022-06-17 ENCOUNTER — HOSPITAL ENCOUNTER (OUTPATIENT)
Age: 77
Discharge: HOME | End: 2022-06-17
Payer: MEDICARE

## 2022-06-17 DIAGNOSIS — I50.20 HFREF (HEART FAILURE WITH REDUCED EJECTION FRACTION) (HCC): ICD-10-CM

## 2022-06-17 LAB
ANION GAP SERPL CALCULATED.3IONS-SCNC: 11 MMOL/L (ref 7–16)
BUN BLDV-MCNC: 20 MG/DL (ref 6–23)
CALCIUM SERPL-MCNC: 9.4 MG/DL (ref 8.6–10.2)
CHLORIDE BLD-SCNC: 100 MMOL/L (ref 98–107)
CO2: 23 MMOL/L (ref 22–29)
CREAT SERPL-MCNC: 1.2 MG/DL (ref 0.7–1.2)
GFR AFRICAN AMERICAN: >60
GFR NON-AFRICAN AMERICAN: 58 ML/MIN/1.73
GLUCOSE BLD-MCNC: 101 MG/DL (ref 74–99)
POTASSIUM SERPL-SCNC: 4.2 MMOL/L (ref 3.5–5)
PRO-BNP: 772 PG/ML (ref 0–450)
SODIUM BLD-SCNC: 134 MMOL/L (ref 132–146)

## 2022-06-17 PROCEDURE — 80048 BASIC METABOLIC PNL TOTAL CA: CPT

## 2022-06-17 PROCEDURE — 36415 COLL VENOUS BLD VENIPUNCTURE: CPT

## 2022-06-17 PROCEDURE — 83880 ASSAY OF NATRIURETIC PEPTIDE: CPT

## 2022-06-20 ENCOUNTER — OFFICE VISIT (OUTPATIENT)
Dept: CARDIOLOGY CLINIC | Age: 77
End: 2022-06-20
Payer: MEDICARE

## 2022-06-20 VITALS
DIASTOLIC BLOOD PRESSURE: 92 MMHG | HEIGHT: 72 IN | SYSTOLIC BLOOD PRESSURE: 144 MMHG | WEIGHT: 217 LBS | BODY MASS INDEX: 29.39 KG/M2 | HEART RATE: 65 BPM | RESPIRATION RATE: 16 BRPM

## 2022-06-20 DIAGNOSIS — I71.20 THORACIC AORTIC ANEURYSM WITHOUT RUPTURE: ICD-10-CM

## 2022-06-20 DIAGNOSIS — I42.9 CARDIOMYOPATHY, UNSPECIFIED TYPE (HCC): ICD-10-CM

## 2022-06-20 DIAGNOSIS — I48.91 ATRIAL FIBRILLATION WITH RAPID VENTRICULAR RESPONSE (HCC): Primary | ICD-10-CM

## 2022-06-20 PROCEDURE — 93000 ELECTROCARDIOGRAM COMPLETE: CPT | Performed by: INTERNAL MEDICINE

## 2022-06-20 PROCEDURE — 99214 OFFICE O/P EST MOD 30 MIN: CPT | Performed by: INTERNAL MEDICINE

## 2022-06-20 PROCEDURE — 1123F ACP DISCUSS/DSCN MKR DOCD: CPT | Performed by: INTERNAL MEDICINE

## 2022-06-20 RX ORDER — FUROSEMIDE 20 MG/1
20 TABLET ORAL DAILY
Qty: 90 TABLET | Refills: 3 | Status: SHIPPED | OUTPATIENT
Start: 2022-06-20

## 2022-06-20 NOTE — PROGRESS NOTES
OUTPATIENT CARDIOLOGY FOLLOW-UP    Name: Kiah Espinoza    Age: 76 y.o. Primary Care Physician: Candie Alvarez MD    Date of Service: 6/20/2022    Chief Complaint:   Chief Complaint   Patient presents with    Atrial Fibrillation     Pt has no complaints        Interim History:   Here for follow-up regarding recently diagnosed atrial fibrillation and severe LV dysfunction. Seen in initial outpatient consultation in 4/1/2022 with new A. fib. Was scheduled for MERT cardioversion outpatient, but on 4/6/2022 presented to the ER and was admitted, treated for CHF and underwent MERT cardioversion. EF is severely reduced. GDMT has been uptitrated in hospital follow-up with Mike Booker and CHF clinic. He feels significantly better. He is back to the gym nearly every day doing walking and stationary bike. He wants to get back on a regular bike as well. He denies chest pain, shortness breath, palpitations, lower extremity edema. He states the furosemide makes him urinate a lot and wishes he could come off it. Tolerating medications well.     Review of Systems:   Negative except as described above    Past Medical History:  Past Medical History:   Diagnosis Date    Arthritis     Environmental allergies     grass mold trees    Hypertension     Renal cell carcinoma [facility]) July 2014    s/p right nephrectomy       Past Surgical History:  Past Surgical History:   Procedure Laterality Date    COLONOSCOPY      DIAGNOSTIC CARDIAC CATH LAB PROCEDURE      HERNIA REPAIR      JOINT REPLACEMENT      beto hips    KNEE CARTILAGE SURGERY  years ago    right    PILONIDAL CYST EXCISION      TESTICLE REMOVAL  1995    TOTAL NEPHRECTOMY  7/22/14    right radical nephrectomy laparoscopic hand assisted with cystoscopy right ureteral catheter insertion        Family History:  Family History   Problem Relation Age of Onset    Heart Failure Father        Social History:  Social History     Tobacco Use    Smoking status: Former Smoker    Smokeless tobacco: Never Used    Tobacco comment: quit in 1971   Vaping Use    Vaping Use: Never used   Substance Use Topics    Alcohol use: No    Drug use: No        Allergies:  No Known Allergies    Current Medications:    Current Outpatient Medications:     furosemide (LASIX) 20 MG tablet, Take 1 tablet by mouth daily, Disp: 90 tablet, Rfl: 3    dapagliflozin (FARXIGA) 10 MG tablet, Take 1 tablet by mouth every morning, Disp: 30 tablet, Rfl: 5    sacubitril-valsartan (ENTRESTO)  MG per tablet, Take 1 tablet by mouth 2 times daily, Disp: 60 tablet, Rfl: 3    metoprolol succinate (TOPROL XL) 50 MG extended release tablet, Take 1 tablet by mouth 2 times daily, Disp: 180 tablet, Rfl: 3    spironolactone (ALDACTONE) 25 MG tablet, Take 1 tablet by mouth daily, Disp: 90 tablet, Rfl: 1    apixaban (ELIQUIS) 5 MG TABS tablet, Take 5 mg by mouth 2 times daily, Disp: , Rfl:     sertraline (ZOLOFT) 25 MG tablet, Take 25 mg by mouth every morning , Disp: , Rfl:     LORazepam (ATIVAN) 1 MG tablet, Take 0.5-1 mg by mouth every 12 hours as needed for Anxiety. , Disp: , Rfl:     APPLE CIDER VINEGAR PO, Take 1 tablet by mouth every morning, Disp: , Rfl:     meloxicam (MOBIC) 15 MG tablet, Take 7.5 mg by mouth daily , Disp: , Rfl:     Physical Exam:  BP (!) 144/92   Pulse 65   Resp 16   Ht 6' (1.829 m)   Wt 217 lb (98.4 kg)   BMI 29.43 kg/m²   Wt Readings from Last 3 Encounters:   06/20/22 217 lb (98.4 kg)   06/02/22 217 lb (98.4 kg)   05/13/22 222 lb (100.7 kg)     Appearance: Well-appearing older gentleman, awake, alert and oriented x 3, no acute respiratory distress  Skin: Intact, no rash  Head: Normocephalic, atraumatic  Eyes: EOMI, no conjunctival erythema  ENMT: No pharyngeal erythema, MMM, no rhinorrhea  Neck: Supple, no elevated JVP, no carotid bruits  Lungs: Clear to auscultation bilaterally. No wheezes, rales, or rhonchi.   Cardiac: Regular rate and rhythm, +S1S2, no murmurs apparent  Abdomen: Soft, nontender, +bowel sounds  Extremities: Moves all extremities x 4, no lower extremity edema  Neurologic: No focal motor deficits apparent, normal mood and affect, alert and oriented x 3  Peripheral Pulses: Intact posterior tibial pulses bilaterally    Laboratory Tests:  Lab Results   Component Value Date    CREATININE 1.2 2022    BUN 20 2022     2022    K 4.2 2022     2022    CO2 23 2022     No results found for: MG  Lab Results   Component Value Date    WBC 4.1 (L) 2022    HGB 13.5 2022    HCT 40.3 2022    MCV 92.4 2022     2022     Lab Results   Component Value Date    ALT 49 (H) 2022    AST 24 2022    ALKPHOS 95 2022    BILITOT 1.0 2022     Lab Results   Component Value Date    CKTOTAL 74 2014    CKMB 1.9 2014    TROPONINI <0.01 2014     Lab Results   Component Value Date    INR 1.0 2022    INR 1.2 2014    PROTIME 11.2 2022    PROTIME 12.9 (H) 2014     Lab Results   Component Value Date    TSH 1.350 2022     No results found for: LABA1C  No results found for: EAG  No results found for: CHOL  No results found for: TRIG  No results found for: HDL  No results found for: LDLCALC, LDLCHOLESTEROL  No results found for: LABVLDL, VLDL  No results found for: CHOLHDLRATIO  No results for input(s): PROBNP in the last 72 hours. Cardiac Tests:  EC2022: Sinus rhythm 65 beats minute. Left anterior fascicular block. Anterolateral ST depression with T wave inversions. Unchanged from prior. Echocardiogram:   MERT [facility] 22     Summary   Left ventricle is grossly normal in size. Borderline left ventricular concentric hypertrophy noted. Normal left ventricle systolic function. Mildly dilated left atrium. Mild spontaneous echo contrast was present in LA cavity. Mild mitral regurgitation is present.    Mild aortic regurgitation is noted. Cope TTE 4/8/22   Summary   Moderate-to-severe dilated left ventricle. No regional wall motion abnormalities seen with severe generalized   hypokinesis. Severely reduced left ventricular systolic dysfunction. EF 30%   There is doppler evidence of stage II diastolic dysfunction. The left atrium is severely dilated. Moderately enlarged left atrial size. Mild centrally directed mitral regurgitation. Mildly dilated aortic root. Dilation of the ascending aorta. Stress test:      Cardiac catheterization:   Cath in 1996 (Lovena Poplin) \"minimal plaque\" in LAD   EF 55%    Orders Placed This Encounter   Procedures    NM Cardiac Stress Test Nuclear Imaging    CTA CHEST W CONTRAST    Cardiac Stress Test - w/Pharm    EKG 12 Lead        Requested Prescriptions     Signed Prescriptions Disp Refills    furosemide (LASIX) 20 MG tablet 90 tablet 3     Sig: Take 1 tablet by mouth daily        ASSESSMENT / PLAN:  1. Paroxysmal atrial fibrillation. Noted 3/2022. Maintaining sinus, on apixaban  a. MERT/DCC 4/6/2022  2. Chronic heart failure with reduced ejection fraction. Euvolemic  3. Cardiomyopathy, EF 30%. Unclear etiology ischemic versus arrhythmia versus other  4. Mild nonobstructive CAD by remote heart cath  5. Dilated ascending aorta, 4.2 cm by echo  6. Hypertension, running high  7. History of renal cell cancer status post right nephrectomy  8. POLINA, recently started on CPAP    Recommendations:  Doing very well from cardiac standpoint, maintaining sinus rhythm and appears euvolemic.     · Continue GDMT  · Metoprolol succinate 50 mg twice daily  · Sacubitril/valsartan  mg twice daily  · Spironolactone 25 mg daily  · Dapagliflozin 10 mg daily  · Decrease furosemide to 20 mg daily with additional doses as needed  · As he is having no anginal symptoms at high functional level, low heart catheterization will perform ischemic eval with a pharmacologic stress test (unable to exercise on treadmill due to hip issues and will likely have difficulty getting his heart rate up)  · If EF still reduced on SPECT imaging, will need reassessment by echo after minimum of 3 months optimized GDMT  · Obtain CTA chest to further evaluate aortic aneurysm  · Continue apixaban for stroke risk reduction  · Recommend discontinuation of meloxicam  · Aggressive risk factor modification  · Follow-up in 3 months or sooner if need arises    Discussed at length with patient and wife    The patient's current medication list, allergies, problem list and results of all previously ordered testing were reviewed at today's visit.     Radha Antonio MD, [facility]

## 2022-06-22 DIAGNOSIS — I50.20 HFREF (HEART FAILURE WITH REDUCED EJECTION FRACTION) (HCC): ICD-10-CM

## 2022-06-22 DIAGNOSIS — Z79.899 NEW MEDICATION ADDED: ICD-10-CM

## 2022-06-27 ENCOUNTER — TELEPHONE (OUTPATIENT)
Dept: CARDIOLOGY | Age: 77
End: 2022-06-27

## 2022-07-06 ENCOUNTER — TELEPHONE (OUTPATIENT)
Dept: CARDIOLOGY | Age: 77
End: 2022-07-06

## 2022-07-06 NOTE — TELEPHONE ENCOUNTER
Spoke with patient and confirmed Lexiscan stress test appointment on July 8, 2022 at 0730. Instructions for test and COVID-19 preprocedure checklist reviewed with patient, questions answered.

## 2022-07-08 ENCOUNTER — HOSPITAL ENCOUNTER (OUTPATIENT)
Dept: CARDIOLOGY | Age: 77
Discharge: HOME | End: 2022-07-08
Payer: MEDICARE

## 2022-07-08 VITALS
SYSTOLIC BLOOD PRESSURE: 146 MMHG | HEART RATE: 54 BPM | DIASTOLIC BLOOD PRESSURE: 90 MMHG | RESPIRATION RATE: 12 BRPM | HEIGHT: 72 IN | WEIGHT: 210 LBS | BODY MASS INDEX: 28.44 KG/M2

## 2022-07-08 DIAGNOSIS — I42.9 CARDIOMYOPATHY, UNSPECIFIED TYPE (HCC): ICD-10-CM

## 2022-07-08 PROCEDURE — A9500 TC99M SESTAMIBI: HCPCS | Performed by: INTERNAL MEDICINE

## 2022-07-08 PROCEDURE — 93017 CV STRESS TEST TRACING ONLY: CPT

## 2022-07-08 PROCEDURE — 6360000002 HC RX W HCPCS: Performed by: INTERNAL MEDICINE

## 2022-07-08 PROCEDURE — 3430000000 HC RX DIAGNOSTIC RADIOPHARMACEUTICAL: Performed by: INTERNAL MEDICINE

## 2022-07-08 PROCEDURE — 78452 HT MUSCLE IMAGE SPECT MULT: CPT

## 2022-07-08 PROCEDURE — 2580000003 HC RX 258: Performed by: INTERNAL MEDICINE

## 2022-07-08 RX ORDER — SODIUM CHLORIDE 0.9 % (FLUSH) 0.9 %
10 SYRINGE (ML) INJECTION PRN
Status: DISCONTINUED | OUTPATIENT
Start: 2022-07-08 | End: 2022-07-09 | Stop reason: HOSPADM

## 2022-07-08 RX ADMIN — Medication 10.7 MILLICURIE: at 07:38

## 2022-07-08 RX ADMIN — Medication 32.4 MILLICURIE: at 08:45

## 2022-07-08 RX ADMIN — SODIUM CHLORIDE, PRESERVATIVE FREE 10 ML: 5 INJECTION INTRAVENOUS at 08:45

## 2022-07-08 RX ADMIN — SODIUM CHLORIDE, PRESERVATIVE FREE 10 ML: 5 INJECTION INTRAVENOUS at 08:46

## 2022-07-08 RX ADMIN — SODIUM CHLORIDE, PRESERVATIVE FREE 10 ML: 5 INJECTION INTRAVENOUS at 07:37

## 2022-07-08 RX ADMIN — REGADENOSON 0.4 MG: 0.08 INJECTION, SOLUTION INTRAVENOUS at 08:45

## 2022-07-08 NOTE — PROCEDURES
[address] and V[facility] [address] [place] [phone]                Pharmacologic Stress Nuclear Gated SPECT Study    Name: [id number] [address] Account Number: [de-identified]    :  1945          Sex: male         Date of Study:  2022    Height: 6' (182.9 cm)         Weight: 210 lb (95.3 kg)     Ordering Provider: Joleen Mcmillan MD          PCP: Jenni Giordano MD      Cardiologist: Joleen Mcmillan MD             Interpreting Physician: Dar Moreno MD  _________________________________________________________________________________    Indication:   Detecting the presence and location of coronary artery disease    Clinical History:   Patient has no known history of coronary artery disease. Resting ECG:    Normal sinus rhythm, ST-T changes suggestive of inferior and lateral wall ischemia. Anterior septal infarct age undetermined, LVH, abnormal EKG    Procedure:   Pharmacologic stress testing was performed with regadenoson 0.4 mg for 15 seconds. Additionally, low-level exercise was performed along with the infusion. The heart rate was 57 at baseline and katie to 100 beats during the infusion. This corresponds to 71% of maximum predicted heart rate. The blood pressure at baseline was 146/90 and blood pressure at the end of infusion was 178/94. Blood pressure response was normal during the stress procedure. The patient experienced slight headache  during the infusion. Occasional PVC's noted. ECG during the infusion did not change. IMAGING: Myocardial perfusion imaging was performed at rest 30-35 minutes following the intravenous injection of 10.7 mCi of (Tc-Sestamibi) followed by 10 ml of Normal Saline. As per infusion protocol, the patient was injected intravenously with 32.4 mCi of (Tc-Sestamibi) followed by 10 ml of Normal Saline. Gated post-stress tomographic imaging was performed 20-25 minutes after stress. FINDINGS: The overall quality of the study was good. Left ventricular cavity size was noted to be enlarged on both rest and stress studies. Rotational analog analysis demonstrated no patient motion or abnormal extracardiac radioactivity. The gated SPECT stress imaging in the short, vertical long, and horizontal long axis demonstrated normal homogeneous tracer distribution throughout the myocardium. The resting images show no change. Gated SPECT left ventricular ejection fraction was calculated to be 34%, with severe global hypokinesis without regional wall motion abnormalities. TID ratio 0.98    Impression:    1. ECG during the infusion did not change. 2. The myocardial perfusion imaging was normal.    3. Overall left ventricular systolic function was normal without regional wall motion abnormalities. 4. No transient ischemic dilatation. 5. High risk general pharmacologic stress test based on severe LV dysfunction. Thank you for sending your patient to this [employer].      Electronically signed by Paulie Gandara MD on 7/8/22 at 3:29 PM EDT

## 2022-07-14 ENCOUNTER — TELEPHONE (OUTPATIENT)
Dept: CARDIOLOGY CLINIC | Age: 77
End: 2022-07-14

## 2022-07-14 ENCOUNTER — HOSPITAL ENCOUNTER (OUTPATIENT)
Dept: OTHER | Age: 77
Setting detail: THERAPIES SERIES
Discharge: HOME | End: 2022-07-14
Payer: MEDICARE

## 2022-07-14 VITALS
OXYGEN SATURATION: 96 % | RESPIRATION RATE: 16 BRPM | HEART RATE: 65 BPM | BODY MASS INDEX: 29.07 KG/M2 | DIASTOLIC BLOOD PRESSURE: 88 MMHG | SYSTOLIC BLOOD PRESSURE: 145 MMHG | WEIGHT: 214.38 LBS

## 2022-07-14 LAB
ANION GAP SERPL CALCULATED.3IONS-SCNC: 12 MMOL/L (ref 7–16)
BUN BLDV-MCNC: 18 MG/DL (ref 6–23)
CALCIUM SERPL-MCNC: 9.2 MG/DL (ref 8.6–10.2)
CHLORIDE BLD-SCNC: 104 MMOL/L (ref 98–107)
CO2: 21 MMOL/L (ref 22–29)
CREAT SERPL-MCNC: 1 MG/DL (ref 0.7–1.2)
GFR AFRICAN AMERICAN: >60
GFR NON-AFRICAN AMERICAN: >60 ML/MIN/1.73
GLUCOSE BLD-MCNC: 134 MG/DL (ref 74–99)
POTASSIUM SERPL-SCNC: 3.9 MMOL/L (ref 3.5–5)
PRO-BNP: 1051 PG/ML (ref 0–450)
SODIUM BLD-SCNC: 137 MMOL/L (ref 132–146)

## 2022-07-14 PROCEDURE — 83880 ASSAY OF NATRIURETIC PEPTIDE: CPT

## 2022-07-14 PROCEDURE — 80048 BASIC METABOLIC PNL TOTAL CA: CPT

## 2022-07-14 PROCEDURE — 36415 COLL VENOUS BLD VENIPUNCTURE: CPT

## 2022-07-14 PROCEDURE — 99214 OFFICE O/P EST MOD 30 MIN: CPT

## 2022-07-14 RX ORDER — CARVEDILOL 12.5 MG/1
12.5 TABLET ORAL 2 TIMES DAILY WITH MEALS
COMMUNITY
End: 2022-07-14 | Stop reason: SDUPTHER

## 2022-07-14 RX ORDER — ACETAMINOPHEN 500 MG
500 TABLET ORAL EVERY 6 HOURS PRN
COMMUNITY

## 2022-07-14 RX ORDER — CARVEDILOL 12.5 MG/1
12.5 TABLET ORAL 2 TIMES DAILY WITH MEALS
Qty: 180 TABLET | Refills: 3 | Status: SHIPPED | OUTPATIENT
Start: 2022-07-14

## 2022-07-14 NOTE — RESULT ENCOUNTER NOTE
Labs and CHF clinic note reviewed  Vitals at CHF clinic: 145/88, 65    Current GDMT:  Entresto 97/103 mg BID  Toprol 50 mg BID  Spironolactone 25 mg daily   Farxiga 10 mg daily   Lasix 20 mg daily      Wil (Anuj on PTO)  Stop Toprol  Start Coreg 12.5 mg BID  Follow up BP/HR check in 1-2 weeks

## 2022-07-14 NOTE — TELEPHONE ENCOUNTER
----- Message from JOSE King CNP sent at 7/14/2022  1:26 PM EDT -----  Labs and CHF clinic note reviewed  Vitals at CHF clinic: 145/88, 65    Current GDMT:  Entresto 97/103 mg BID  Toprol 50 mg BID  Spironolactone 25 mg daily   Farxiga 10 mg daily   Lasix 20 mg daily      Wil (Anuj on PTO)  Stop Toprol  Start Coreg 12.5 mg BID  Follow up BP/HR check in 1-2 weeks

## 2022-07-14 NOTE — PROGRESS NOTES
Congestive Heart Failure [place] [address] [facility]  [address] [place] [address]    1945          Referring Provider: Lizette Munoz  Primary Care Physician: Naren Guillory  Cardiologist: Randa Asif  Nephrologist:         History of Present Illness:     Jerry Valdez is a 76 y.o. male with a history of HFrEF, most recent EF 30%. Patient Story:    He does not have dyspnea with exertion, shortness of breath, or decline in overall functional capacity. He does not have orthopnea, PND, nocturnal cough or hemoptysis. He does not have abdominal distention or bloating, early satiety, anorexia/change in appetite. He does have a good urinary response to  oral diuretic. He does not have  lower extremity edema. He denies lightheadedness, dizziness. He denies palpitations, syncope or near syncope. He does not complain of chest pain, pressure, discomfort. No Known Allergies        Prior to Visit Medications    Medication Sig Taking?  Authorizing Provider   acetaminophen (TYLENOL) 500 MG tablet Take 500 mg by mouth every 6 hours as needed for Pain Yes Historical Provider, MD   dapagliflozin (FARXIGA) 10 MG tablet Take 1 tablet by mouth every morning  Fiona Mar, APRN - CNP   furosemide (LASIX) 20 MG tablet Take 1 tablet by mouth daily  Samantha Hi MD   sacubitril-valsartan (ENTRESTO)  MG per tablet Take 1 tablet by mouth 2 times daily  Fiona Mar, APRN - CNP   metoprolol succinate (TOPROL XL) 50 MG extended release tablet Take 1 tablet by mouth 2 times daily  Fiona Mar, APRN - CNP   spironolactone (ALDACTONE) 25 MG tablet Take 1 tablet by mouth daily  Fiona Mar, APRN - CNP   apixaban (ELIQUIS) 5 MG TABS tablet Take 5 mg by mouth 2 times daily  Historical Provider, MD   sertraline (ZOLOFT) 25 MG tablet Take 25 mg by mouth every morning   Historical Provider, MD   LORazepam (ATIVAN) 1 MG tablet Take 0.5-1 mg by mouth every 12 hours as needed for Anxiety. Historical Provider, MD   APPLE CIDER VINEGAR PO Take 1 tablet by mouth every morning  Historical Provider, MD   meloxicam (MOBIC) 15 MG tablet Take 7.5 mg by mouth daily   Historical Provider, MD           Guideline directed medical:  ARNI/ACE I/ARB: Yes  Beta blocker: Yes  Aldosterone antagonist:  Yes        Physical Examination:     BP (!) 145/88   Pulse 65   Resp 16   Wt 214 lb 6 oz (97.2 kg)   SpO2 96%   BMI 29.07 kg/m²     Assessment  Charting Type: Shift assessment (chf clinic)    Neurological  Level of Consciousness: Alert (0)         HEENT (Head, Ears, Eyes, Nose, & Throat)  HEENT (WDL): Exceptions to WDL  Right Eye: Glasses,Impaired vision  Left Eye: Impaired vision,Glasses    Respiratory  Respiratory Pattern: Regular  Respiratory Depth: Normal  Respiratory Quality/Effort: Unlabored  L Breath Sounds: Clear  R Breath Sounds: Clear              Cardiac  Cardiac Regularity: Regular  Cardiac Rhythm: Sinus rhythm    Rhythm Interpretation  Heart Rate: 65         Gastrointestinal  Abdominal (WDL): Within Defined Limits  Abdomen Inspection: Rounded,Soft  RUQ Bowel Sounds: Active  LUQ Bowel Sounds: Active  RLQ Bowel Sounds: Active  LLQ Bowel Sounds: Active          Bowel Sounds  RUQ Bowel Sounds: Active  LUQ Bowel Sounds: Active  RLQ Bowel Sounds: Active  LLQ Bowel Sounds:  Active    Peripheral Vascular  Edema: Right lower extremity,Left lower extremity  RLE Edema: Trace,Non-pitting  LLE Edema: Trace,Non-pitting                   Genitourinary  Genitourinary (WDL): Within Defined Limits    Psychosocial  Psychosocial (WDL): Within Defined Limits                        Heart Rate: 65                     LAB DATA:    Last 3 BMP      Sodium (mmol/L)   Date Value   07/14/2022 137   06/17/2022 134   06/10/2022 136     Potassium (mmol/L)   Date Value   07/14/2022 3.9   06/17/2022 4.2   06/10/2022 4.2     Potassium reflex Magnesium (mmol/L)   Date Value   04/07/2022 3.7   04/06/2022 3.8   03/25/2022 3.6 follow up in CHF clinic on   Future Appointments   Date Time Provider Kristen Anders   9/1/2022 10:00 AM [facility] ROOM 1 [facility]   9/15/2022  1:30 PM Theresa Herzog MD [address] [facility]     Given clinic phone number [phone] and aware of signs and symptoms to call with any HF change in symptoms.

## 2022-07-15 NOTE — RESULT ENCOUNTER NOTE
Stress test showed overall normal blood flow to the heart. His ejection fraction is still low at 34%. Continue present medical therapy. We will check echo in about 3 months to see how his ejection fraction is. I had also ordered a CT of the chest to evaluate his aorta but I do not see that it has been done. Otherwise follow-up as scheduled.

## 2022-07-18 ENCOUNTER — TELEPHONE (OUTPATIENT)
Dept: CARDIOLOGY CLINIC | Age: 77
End: 2022-07-18

## 2022-07-18 NOTE — TELEPHONE ENCOUNTER
Contacted patient's wife with results and recommendations per Dr. Pineda Smith. She verbalized understanding and will advise patient. They will contact Central Scheduling and get the CT chest scheduled. ----- Message from Sukhi Wall MD sent at 7/15/2022  2:49 PM EDT -----  Stress test showed overall normal blood flow to the heart. His ejection fraction is still low at 34%. Continue present medical therapy. We will check echo in about 3 months to see how his ejection fraction is. I had also ordered a CT of the chest to evaluate his aorta but I do not see that it has been done. Otherwise follow-up as scheduled.

## 2022-08-04 ENCOUNTER — HOSPITAL ENCOUNTER (OUTPATIENT)
Dept: CT IMAGING | Age: 77
Discharge: HOME | End: 2022-08-06
Payer: MEDICARE

## 2022-08-04 ENCOUNTER — HOSPITAL ENCOUNTER (OUTPATIENT)
Age: 77
Discharge: HOME | End: 2022-08-04
Payer: MEDICARE

## 2022-08-04 DIAGNOSIS — I71.20 THORACIC AORTIC ANEURYSM WITHOUT RUPTURE: ICD-10-CM

## 2022-08-04 PROCEDURE — 71275 CT ANGIOGRAPHY CHEST: CPT

## 2022-08-04 PROCEDURE — 6360000004 HC RX CONTRAST MEDICATION: Performed by: RADIOLOGY

## 2022-08-04 RX ADMIN — IOPAMIDOL 75 ML: 755 INJECTION, SOLUTION INTRAVENOUS at 16:13

## 2022-08-05 ENCOUNTER — TELEPHONE (OUTPATIENT)
Dept: CARDIOLOGY CLINIC | Age: 77
End: 2022-08-05

## 2022-08-05 DIAGNOSIS — I42.9 CARDIOMYOPATHY, UNSPECIFIED TYPE (HCC): ICD-10-CM

## 2022-08-05 DIAGNOSIS — R06.02 SHORTNESS OF BREATH: ICD-10-CM

## 2022-08-05 DIAGNOSIS — R94.30 EJECTION FRACTION < 50%: Primary | ICD-10-CM

## 2022-08-05 NOTE — TELEPHONE ENCOUNTER
Left message for patient to contact office. ----- Message from Zita Phillips MD sent at 8/5/2022  2:09 PM EDT -----  CT scan revealed a 4.5 cm ascending thoracic aortic aneurysm. Recommend repeat CT in 6 months to monitor. Would recommend addition of atorvastatin 20 mg daily. Please ensure follow-up echo scheduled as previously discussed. Follow-up as scheduled.

## 2022-08-08 RX ORDER — ATORVASTATIN CALCIUM 20 MG/1
20 TABLET, FILM COATED ORAL DAILY
Qty: 30 TABLET | Refills: 5 | Status: SHIPPED
Start: 2022-08-08 | End: 2022-10-06 | Stop reason: SDUPTHER

## 2022-08-08 NOTE — TELEPHONE ENCOUNTER
Contacted patient with CT results and recommendations per Dr. Shine Senior. Patient verbalized understanding. Echo order for October 2022 in [facility]. Script for atorvastatin 20 mg daily forwarded to Dr Shine Senior for signature.

## 2022-08-09 DIAGNOSIS — I50.20 HFREF (HEART FAILURE WITH REDUCED EJECTION FRACTION) (HCC): ICD-10-CM

## 2022-08-09 RX ORDER — SPIRONOLACTONE 25 MG/1
TABLET ORAL
Qty: 90 TABLET | Refills: 3 | Status: SHIPPED | OUTPATIENT
Start: 2022-08-09

## 2022-08-11 ENCOUNTER — HOSPITAL ENCOUNTER (OUTPATIENT)
Dept: OTHER | Age: 77
Discharge: HOME | End: 2022-08-11

## 2022-08-18 ENCOUNTER — HOSPITAL ENCOUNTER (OUTPATIENT)
Dept: OTHER | Age: 77
Setting detail: THERAPIES SERIES
Discharge: HOME | End: 2022-08-18
Payer: MEDICARE

## 2022-08-18 VITALS
WEIGHT: 213 LBS | HEART RATE: 67 BPM | SYSTOLIC BLOOD PRESSURE: 130 MMHG | DIASTOLIC BLOOD PRESSURE: 78 MMHG | OXYGEN SATURATION: 95 % | BODY MASS INDEX: 28.89 KG/M2 | RESPIRATION RATE: 16 BRPM

## 2022-08-18 LAB
ANION GAP SERPL CALCULATED.3IONS-SCNC: 11 MMOL/L (ref 7–16)
BUN BLDV-MCNC: 16 MG/DL (ref 6–23)
CALCIUM SERPL-MCNC: 9.2 MG/DL (ref 8.6–10.2)
CHLORIDE BLD-SCNC: 102 MMOL/L (ref 98–107)
CO2: 22 MMOL/L (ref 22–29)
CREAT SERPL-MCNC: 1.1 MG/DL (ref 0.7–1.2)
GFR AFRICAN AMERICAN: >60
GFR NON-AFRICAN AMERICAN: >60 ML/MIN/1.73
GLUCOSE BLD-MCNC: 139 MG/DL (ref 74–99)
POTASSIUM SERPL-SCNC: 4 MMOL/L (ref 3.5–5)
PRO-BNP: 583 PG/ML (ref 0–450)
SODIUM BLD-SCNC: 135 MMOL/L (ref 132–146)

## 2022-08-18 PROCEDURE — 99214 OFFICE O/P EST MOD 30 MIN: CPT

## 2022-08-18 PROCEDURE — 36415 COLL VENOUS BLD VENIPUNCTURE: CPT

## 2022-08-18 PROCEDURE — 80048 BASIC METABOLIC PNL TOTAL CA: CPT

## 2022-08-18 PROCEDURE — 83880 ASSAY OF NATRIURETIC PEPTIDE: CPT

## 2022-08-18 NOTE — PROGRESS NOTES
[facility] [place] [address] [facility]  [address] [place] [address]    1945          Referring Provider: Alex Rizo  Primary Care Physician: Zaida Forte  Cardiologist: Roberto Kelly  Nephrologist:         History of Present Illness:     Evens Rodriguez is a 76 y.o. male with a history of HFrEF, most recent EF 30%. Patient Story:    He does not have dyspnea with exertion, shortness of breath, or decline in overall functional capacity. He does not have orthopnea, PND, nocturnal cough or hemoptysis. He does not have abdominal distention or bloating, early satiety, anorexia/change in appetite. He does have a good urinary response to  oral diuretic. He does not have  lower extremity edema. He denies lightheadedness, dizziness. He denies palpitations, syncope or near syncope. He does not complain of chest pain, pressure, discomfort. No Known Allergies        Prior to Visit Medications    Medication Sig Taking? Authorizing Provider   spironolactone (ALDACTONE) 25 MG tablet TAKE ONE TABLET BY MOUTH EVERY DAY  Katina Rodriguez MD   atorvastatin (LIPITOR) 20 MG tablet Take 1 tablet by mouth in the morning.   Katina Rodriguez MD   acetaminophen (TYLENOL) 500 MG tablet Take 500 mg by mouth every 6 hours as needed for Pain  Historical Provider, MD   carvedilol (COREG) 12.5 MG tablet Take 1 tablet by mouth 2 times daily (with meals)  Doreatha Sinks, APRN - CNP   dapagliflozin (FARXIGA) 10 MG tablet Take 1 tablet by mouth every morning  Doreatha Sinks, APRN - CNP   furosemide (LASIX) 20 MG tablet Take 1 tablet by mouth daily  Katina Rodriguez MD   sacubitril-valsartan (ENTRESTO)  MG per tablet Take 1 tablet by mouth 2 times daily  Doreatha Sinks, APRN - CNP   apixaban (ELIQUIS) 5 MG TABS tablet Take 5 mg by mouth 2 times daily  Historical Provider, MD   sertraline (ZOLOFT) 25 MG tablet Take 25 mg by mouth every morning   Historical Provider, MD LORazepam (ATIVAN) 1 MG tablet Take 0.5-1 mg by mouth every 12 hours as needed for Anxiety. Historical Provider, MD   APPLE CIDER VINEGAR PO Take 1 tablet by mouth every morning  Historical Provider, MD   meloxicam (MOBIC) 15 MG tablet Take 7.5 mg by mouth nightly  Historical Provider, MD           Guideline directed medical:  ARNI/ACE I/ARB: Yes  Beta blocker:   Yes  Aldosterone antagonist:  Yes        Physical Examination:     /78 Comment: manual  Pulse 67   Resp 16   Wt 213 lb (96.6 kg)   SpO2 95%   BMI 28.89 kg/m²     Assessment  Charting Type: Shift assessment (chf clinic)    Neurological  Level of Consciousness: Alert (0)              Respiratory  Respiratory Quality/Effort: Unlabored  Chest Assessment: Chest expansion symmetrical              Cardiac  Cardiac Rhythm: Sinus rhythm    Rhythm Interpretation  Heart Rate: 67         Gastrointestinal  Abdominal (WDL): Within Defined Limits  Abdomen Inspection: Soft               Peripheral Vascular  Peripheral Vascular (WDL): Exceptions to WDL  Edema: Right lower extremity, Left lower extremity  RLE Edema: None  LLE Edema: Trace                   Genitourinary  Genitourinary (WDL): Within Defined Limits    Psychosocial  Psychosocial (WDL): Within Defined Limits    Pain Assessment  Pain Assessment: None - Denies Pain                   Heart Rate: 67                     LAB DATA:    Last 3 BMP      Sodium (mmol/L)   Date Value   08/18/2022 135   07/14/2022 137   06/17/2022 134     Potassium (mmol/L)   Date Value   08/18/2022 4.0   07/14/2022 3.9   06/17/2022 4.2     Potassium reflex Magnesium (mmol/L)   Date Value   04/07/2022 3.7   04/06/2022 3.8   03/25/2022 3.6     Chloride (mmol/L)   Date Value   08/18/2022 102   07/14/2022 104   06/17/2022 100     CO2 (mmol/L)   Date Value   08/18/2022 22   07/14/2022 21 (L)   06/17/2022 23     BUN (mg/dL)   Date Value   08/18/2022 16   07/14/2022 18   06/17/2022 20     Glucose (mg/dL)   Date Value   08/18/2022 139 (H)   07/14/2022 134 (H)   06/17/2022 101 (H)     Calcium (mg/dL)   Date Value   08/18/2022 9.2   07/14/2022 9.2   06/17/2022 9.4       Last 3 BNP       Pro-BNP (pg/mL)   Date Value   08/18/2022 583 (H)   07/14/2022 1,051 (H)   06/17/2022 772 (H)          CBC: No results for input(s): WBC, HGB, PLT in the last 72 hours. BMP:    Recent Labs     08/18/22  0902      K 4.0      CO2 22   BUN 16   CREATININE 1.1   GLUCOSE 139*       Hepatic: No results for input(s): AST, ALT, ALB, BILITOT, ALKPHOS in the last 72 hours. Troponin: No results for input(s): TROPONINI in the last 72 hours. BNP: No results for input(s): BNP in the last 72 hours. Lipids: No results for input(s): CHOL, HDL in the last 72 hours. Invalid input(s): LDLCALCU  INR: No results for input(s): INR in the last 72 hours. WEIGHTS:    Wt Readings from Last 3 Encounters:   08/18/22 213 lb (96.6 kg)   07/14/22 214 lb 6 oz (97.2 kg)   07/08/22 210 lb (95.3 kg)         TELEMETRY:  Cardiac Regularity: Regular  Cardiac Rhythm/Interpretation: SR        ASSESSMENT:  Alexandre Charles is evolemic with decreased weight and no shortness of breath or lightheadedness. His legs remain unchanged with trace edema  left leg and he says he is still urinating well, approximately 8 times in 24 hours with the decrease in furosemide to 20 mg daily. He exercises a [employer] Patient is for repeat ECHO 9-15-22 requesting to have appointment at [address] [place] [address] same week request honored. Dr Williams Harvey started patient on Lipitor 20mg daily grupo well        Interventions completed this visit:  IV diuretics given no  Lab work obtained yes, proBNP/BMP   Reviewed currently prescribed medications with patient, educated on importance of compliance and answered any questions regarding their medication  Educated on signs and symptoms of HF  Educated on low sodium diet    PLAN:  Scheduled to follow up in CHF clinic on   Future Appointments   Date Time Provider Kristen Anders   9/13/2022 9:00 AM [facility] ROOM 1 [facility] [place] HOD   9/15/2022 11:15 AM SE FRAN ECHO RM 2 YZ FRAN Naidu   9/15/2022  1:30 PM Oliva Davidson MD [facility] clinic phone number [phone] and aware of signs and symptoms to call with any HF change in symptoms.

## 2022-09-01 ENCOUNTER — HOSPITAL ENCOUNTER (OUTPATIENT)
Dept: OTHER | Age: 77
Discharge: HOME | End: 2022-09-01

## 2022-09-07 DIAGNOSIS — I50.20 HFREF (HEART FAILURE WITH REDUCED EJECTION FRACTION) (HCC): ICD-10-CM

## 2022-09-07 DIAGNOSIS — Z79.899 MEDICATION DOSE INCREASED: ICD-10-CM

## 2022-09-07 RX ORDER — SACUBITRIL AND VALSARTAN 97; 103 MG/1; MG/1
TABLET, FILM COATED ORAL
Qty: 180 TABLET | Refills: 3 | Status: SHIPPED | OUTPATIENT
Start: 2022-09-07

## 2022-09-13 ENCOUNTER — HOSPITAL ENCOUNTER (OUTPATIENT)
Dept: OTHER | Age: 77
Setting detail: THERAPIES SERIES
Discharge: HOME | End: 2022-09-13
Payer: MEDICARE

## 2022-09-13 VITALS
HEART RATE: 66 BPM | BODY MASS INDEX: 29.43 KG/M2 | DIASTOLIC BLOOD PRESSURE: 72 MMHG | RESPIRATION RATE: 18 BRPM | OXYGEN SATURATION: 97 % | WEIGHT: 217 LBS | SYSTOLIC BLOOD PRESSURE: 138 MMHG

## 2022-09-13 LAB
ANION GAP SERPL CALCULATED.3IONS-SCNC: 9 MMOL/L (ref 7–16)
BUN BLDV-MCNC: 17 MG/DL (ref 6–23)
CALCIUM SERPL-MCNC: 9.5 MG/DL (ref 8.6–10.2)
CHLORIDE BLD-SCNC: 103 MMOL/L (ref 98–107)
CO2: 24 MMOL/L (ref 22–29)
CREAT SERPL-MCNC: 1.1 MG/DL (ref 0.7–1.2)
GFR AFRICAN AMERICAN: >60
GFR NON-AFRICAN AMERICAN: >60 ML/MIN/1.73
GLUCOSE BLD-MCNC: 124 MG/DL (ref 74–99)
POTASSIUM SERPL-SCNC: 4 MMOL/L (ref 3.5–5)
PRO-BNP: 462 PG/ML (ref 0–450)
SODIUM BLD-SCNC: 136 MMOL/L (ref 132–146)

## 2022-09-13 PROCEDURE — 36415 COLL VENOUS BLD VENIPUNCTURE: CPT

## 2022-09-13 PROCEDURE — 83880 ASSAY OF NATRIURETIC PEPTIDE: CPT

## 2022-09-13 PROCEDURE — 99214 OFFICE O/P EST MOD 30 MIN: CPT

## 2022-09-13 PROCEDURE — 80048 BASIC METABOLIC PNL TOTAL CA: CPT

## 2022-09-14 ENCOUNTER — TELEPHONE (OUTPATIENT)
Dept: CARDIOLOGY | Age: 77
End: 2022-09-14

## 2022-09-14 ENCOUNTER — TELEPHONE (OUTPATIENT)
Dept: CARDIOLOGY CLINIC | Age: 77
End: 2022-09-14

## 2022-09-14 NOTE — TELEPHONE ENCOUNTER
Contacted patient's wife with lab results per Dr. Janice Fine. She verbalized understanding and will inform patient.   Confirmed appointment for 9/15/2022.    ----- Message from Franklyn Osborn MD sent at 9/13/2022  4:43 PM EDT -----  Blood work kooks good kidney function/electrolytes normal, proBNP improved  Cont same has f/u this week

## 2022-09-15 ENCOUNTER — HOSPITAL ENCOUNTER (OUTPATIENT)
Dept: CARDIOLOGY | Age: 77
Discharge: HOME | End: 2022-09-15
Payer: MEDICARE

## 2022-09-15 DIAGNOSIS — I42.9 CARDIOMYOPATHY, UNSPECIFIED TYPE (HCC): ICD-10-CM

## 2022-09-15 DIAGNOSIS — R06.02 SHORTNESS OF BREATH: ICD-10-CM

## 2022-09-15 DIAGNOSIS — R94.30 EJECTION FRACTION < 50%: ICD-10-CM

## 2022-09-15 PROCEDURE — 93308 TTE F-UP OR LMTD: CPT

## 2022-09-16 ENCOUNTER — TELEPHONE (OUTPATIENT)
Dept: CARDIOLOGY CLINIC | Age: 77
End: 2022-09-16

## 2022-09-16 NOTE — TELEPHONE ENCOUNTER
Pt had to cancel appt for 2022 with Carson Guardado due to  services to attend. Pt denied r/s of 10/04/2022 with Carson Guardado. Pt would like to know if someone can call him with results of Echo and schedule him for later date with Michael? Please return call and advise. Thank you.

## 2022-09-19 NOTE — RESULT ENCOUNTER NOTE
LVEF improved compared to prior echo but still moderately reduced at 35-40%. Cont same meds f/u as scheduled with me.

## 2022-09-20 NOTE — TELEPHONE ENCOUNTER
Contacted patient with echo results and recommendations per Dr. Loli Fernández. Patient verbalized understanding. Patient scheduled for follow-up with Dr. Loli Fernández on 12/1/2022.    ----- Message from Suleman Graham MD sent at 9/19/2022 10:41 AM EDT -----  LVEF improved compared to prior echo but still moderately reduced at 35-40%. Cont same meds f/u as scheduled with me.

## 2022-10-06 RX ORDER — ATORVASTATIN CALCIUM 20 MG/1
20 TABLET, FILM COATED ORAL DAILY
Qty: 90 TABLET | Refills: 3 | Status: SHIPPED | OUTPATIENT
Start: 2022-10-06

## 2022-11-14 ENCOUNTER — HOSPITAL ENCOUNTER (OUTPATIENT)
Dept: OTHER | Age: 77
Setting detail: THERAPIES SERIES
Discharge: HOME | End: 2022-11-14
Payer: MEDICARE

## 2022-11-14 VITALS
WEIGHT: 222 LBS | BODY MASS INDEX: 30.11 KG/M2 | HEART RATE: 64 BPM | SYSTOLIC BLOOD PRESSURE: 132 MMHG | DIASTOLIC BLOOD PRESSURE: 60 MMHG | OXYGEN SATURATION: 94 % | RESPIRATION RATE: 16 BRPM

## 2022-11-14 LAB
ANION GAP SERPL CALCULATED.3IONS-SCNC: 10 MMOL/L (ref 7–16)
BUN BLDV-MCNC: 20 MG/DL (ref 6–23)
CALCIUM SERPL-MCNC: 9.3 MG/DL (ref 8.6–10.2)
CHLORIDE BLD-SCNC: 104 MMOL/L (ref 98–107)
CO2: 22 MMOL/L (ref 22–29)
CREAT SERPL-MCNC: 1.2 MG/DL (ref 0.7–1.2)
GFR SERPL CREATININE-BSD FRML MDRD: >60 ML/MIN/1.73
GLUCOSE BLD-MCNC: 105 MG/DL (ref 74–99)
POTASSIUM SERPL-SCNC: 4.7 MMOL/L (ref 3.5–5)
PRO-BNP: 503 PG/ML (ref 0–450)
SODIUM BLD-SCNC: 136 MMOL/L (ref 132–146)

## 2022-11-14 PROCEDURE — 36415 COLL VENOUS BLD VENIPUNCTURE: CPT

## 2022-11-14 PROCEDURE — 80048 BASIC METABOLIC PNL TOTAL CA: CPT

## 2022-11-14 PROCEDURE — 99214 OFFICE O/P EST MOD 30 MIN: CPT

## 2022-11-14 PROCEDURE — 83880 ASSAY OF NATRIURETIC PEPTIDE: CPT

## 2022-11-14 NOTE — RESULT ENCOUNTER NOTE
Labs and CHF clinic note reviewed  Vitals: /60   Pulse 64    Current GDMT:  Coreg 12.5 mg BID  Entresto 97/103 mg BID  Spironolactone 25 mg daily   Farxiga 10 mg daily   Lasix 20 mg daily     Continue current management

## 2022-11-14 NOTE — PROGRESS NOTES
Congestive Heart Failure [place] [address] [facility]  [address] [facility]   1945    Referring Provider: Nathalie Trotter  Primary Care Physician: Ivonne Robertson  Cardiologist: April Metzger  Nephrologist:     History of Present Illness:     Milena Fulton is a 76 y.o. male with a history of HFrEF, most recent EF 35-40% on 9/15/2022. Patient Story:    He does not have dyspnea with exertion, shortness of breath, or decline in overall functional capacity. He does not have orthopnea, PND, nocturnal cough or hemoptysis. He does not have abdominal distention or bloating, early satiety, anorexia/change in appetite. He does have a good urinary response to  oral diuretic. He does not have  lower extremity edema. He denies lightheadedness, dizziness. He denies palpitations, syncope or near syncope. He does not complain of chest pain, pressure, discomfort. No Known Allergies      Prior to Visit Medications    Medication Sig Taking?  Authorizing Provider   atorvastatin (LIPITOR) 20 MG tablet Take 1 tablet by mouth daily  Nadeem Smith MD   ENTRESTO  MG per tablet TAKE 1 TABLET TWICE A DAY  Jennifer Tse MD   spironolactone (ALDACTONE) 25 MG tablet TAKE ONE TABLET BY MOUTH EVERY DAY  Jennifer Tse MD   acetaminophen (TYLENOL) 500 MG tablet Take 500 mg by mouth every 6 hours as needed for Pain  Historical Provider, MD   carvedilol (COREG) 12.5 MG tablet Take 1 tablet by mouth 2 times daily (with meals)  JOSE Posada CNP   dapagliflozin (FARXIGA) 10 MG tablet Take 1 tablet by mouth every morning  JOSE Posada CNP   furosemide (LASIX) 20 MG tablet Take 1 tablet by mouth daily  Jennifer Tse MD   apixaban (ELIQUIS) 5 MG TABS tablet Take 5 mg by mouth 2 times daily  Historical Provider, MD   sertraline (ZOLOFT) 25 MG tablet Take 25 mg by mouth every morning   Historical Provider, MD   LORazepam (ATIVAN) 1 MG tablet Take 0.5-1 mg by mouth every 12 hours as needed for Anxiety. Historical Provider, MD   APPLE CIDER VINEGAR PO Take 1 tablet by mouth every morning  Historical Provider, MD   meloxicam (MOBIC) 15 MG tablet Take 15 mg by mouth in the morning and at bedtime  Historical Provider, MD     Guideline directed medical:  ARNI/ACE I/ARB: Yes  Beta blocker: Yes  Aldosterone antagonist:  Yes  SGLT2i: yes    Physical Examination:     /60   Pulse 64   Resp 16   Wt 222 lb (100.7 kg)   SpO2 94%   BMI 30.11 kg/m²     Assessment  Charting Type: Shift assessment (chf clinic)    Neurological  Level of Consciousness: Alert (0)       HEENT (Head, Ears, Eyes, Nose, & Throat)  HEENT (WDL): Exceptions to WDL  Right Eye: Glasses, Impaired vision  Left Eye: Impaired vision, Glasses    Respiratory  Respiratory Pattern: Regular  Respiratory Depth: Normal  L Breath Sounds: Clear  R Breath Sounds: Clear              Cardiac  Cardiac Rhythm: Sinus rhythm    Rhythm Interpretation  Heart Rate: 64         Gastrointestinal  Abdominal (WDL): Within Defined Limits  Abdomen Inspection: Soft  RUQ Bowel Sounds: Active  LUQ Bowel Sounds: Active  RLQ Bowel Sounds: Active  LLQ Bowel Sounds: Active          Bowel Sounds  RUQ Bowel Sounds: Active  LUQ Bowel Sounds: Active  RLQ Bowel Sounds: Active  LLQ Bowel Sounds:  Active    Peripheral Vascular  Peripheral Vascular (WDL): Within Defined Limits  Edema: None                   Genitourinary  Genitourinary (WDL): Within Defined Limits    Psychosocial  Psychosocial (WDL): Within Defined Limits                        Heart Rate: 64                     LAB DATA:    Last 3 BMP      Sodium (mmol/L)   Date Value   11/14/2022 136   09/13/2022 136   08/18/2022 135     Potassium (mmol/L)   Date Value   11/14/2022 4.7   09/13/2022 4.0   08/18/2022 4.0     Potassium reflex Magnesium (mmol/L)   Date Value   04/07/2022 3.7   04/06/2022 3.8   03/25/2022 3.6     Chloride (mmol/L)   Date Value   11/14/2022 104   09/13/2022 103   08/18/2022 102 CO2 (mmol/L)   Date Value   11/14/2022 22   09/13/2022 24   08/18/2022 22     BUN (mg/dL)   Date Value   11/14/2022 20   09/13/2022 17   08/18/2022 16     Glucose (mg/dL)   Date Value   11/14/2022 105 (H)   09/13/2022 124 (H)   08/18/2022 139 (H)     Calcium (mg/dL)   Date Value   11/14/2022 9.3   09/13/2022 9.5   08/18/2022 9.2     Last 3 BNP       Pro-BNP (pg/mL)   Date Value   11/14/2022 503 (H)   09/13/2022 462 (H)   08/18/2022 583 (H)      CBC: No results for input(s): WBC, HGB, PLT in the last 72 hours. BMP:    Recent Labs     11/14/22  1011      K 4.7      CO2 22   BUN 20   CREATININE 1.2   GLUCOSE 105*         Hepatic: No results for input(s): AST, ALT, ALB, BILITOT, ALKPHOS in the last 72 hours. Troponin: No results for input(s): TROPONINI in the last 72 hours. BNP: No results for input(s): BNP in the last 72 hours. Lipids: No results for input(s): CHOL, HDL in the last 72 hours. Invalid input(s): LDLCALCU  INR: No results for input(s): INR in the last 72 hours. WEIGHTS:    Wt Readings from Last 3 Encounters:   11/14/22 222 lb (100.7 kg)   09/13/22 217 lb (98.4 kg)   08/18/22 213 lb (96.6 kg)     TELEMETRY:  Cardiac Regularity: Regular  Cardiac Rhythm/Interpretation: SR    ASSESSMENT:  Krysta Alatorre is  euvolemic with no shortness of breath or edema and   He continues his exercises at [facility] most days of the week.        Interventions completed this visit:  IV diuretics given no  Lab work obtained yes, proBNP/BMP   Reviewed currently prescribed medications with patient, educated on importance of compliance and answered any questions regarding their medication  Educated on signs and symptoms of HF  Educated on low sodium diet    PLAN:  Scheduled to follow up in CHF clinic on   Future Appointments   Date Time Provider Kristen Anders   12/1/2022  8:30 AM Denzel Rios MD [facility]   2/14/2023  8:00 AM [facility] ROOM 1 [facility] phone number [phone] and aware of signs and symptoms to call with any HF change in symptoms.

## 2022-12-01 ENCOUNTER — OFFICE VISIT (OUTPATIENT)
Dept: CARDIOLOGY CLINIC | Age: 77
End: 2022-12-01
Payer: MEDICARE

## 2022-12-01 VITALS
RESPIRATION RATE: 16 BRPM | DIASTOLIC BLOOD PRESSURE: 90 MMHG | BODY MASS INDEX: 29.87 KG/M2 | SYSTOLIC BLOOD PRESSURE: 132 MMHG | HEIGHT: 72 IN | HEART RATE: 70 BPM | WEIGHT: 220.5 LBS

## 2022-12-01 DIAGNOSIS — I50.42 CHRONIC COMBINED SYSTOLIC AND DIASTOLIC CONGESTIVE HEART FAILURE (HCC): ICD-10-CM

## 2022-12-01 DIAGNOSIS — Z79.01 ON APIXABAN THERAPY: ICD-10-CM

## 2022-12-01 DIAGNOSIS — Z01.810 PREOPERATIVE CARDIOVASCULAR EXAMINATION: ICD-10-CM

## 2022-12-01 DIAGNOSIS — I48.0 PAROXYSMAL ATRIAL FIBRILLATION (HCC): Primary | ICD-10-CM

## 2022-12-01 DIAGNOSIS — I71.21 ANEURYSM OF ASCENDING AORTA WITHOUT RUPTURE: ICD-10-CM

## 2022-12-01 DIAGNOSIS — I44.4 LAFB (LEFT ANTERIOR FASCICULAR BLOCK): ICD-10-CM

## 2022-12-01 DIAGNOSIS — I42.9 CARDIOMYOPATHY, UNSPECIFIED TYPE (HCC): ICD-10-CM

## 2022-12-01 PROCEDURE — 1123F ACP DISCUSS/DSCN MKR DOCD: CPT | Performed by: INTERNAL MEDICINE

## 2022-12-01 PROCEDURE — 99215 OFFICE O/P EST HI 40 MIN: CPT | Performed by: INTERNAL MEDICINE

## 2022-12-01 PROCEDURE — 3078F DIAST BP <80 MM HG: CPT | Performed by: INTERNAL MEDICINE

## 2022-12-01 PROCEDURE — 3074F SYST BP LT 130 MM HG: CPT | Performed by: INTERNAL MEDICINE

## 2022-12-01 PROCEDURE — 93000 ELECTROCARDIOGRAM COMPLETE: CPT | Performed by: INTERNAL MEDICINE

## 2022-12-01 NOTE — PROGRESS NOTES
OUTPATIENT CARDIOLOGY FOLLOW-UP    Name: Loc Walker    Age: 76 y.o. Primary Care Physician: Radha Peters MD    Date of Service: 12/1/2022    Chief Complaint:   Chief Complaint   Patient presents with    Atrial Fibrillation     3 months- Patient has no complaints. Interim History:   Here for follow-up regarding recently diagnosed atrial fibrillation and severe LV dysfunction. Seen in initial outpatient consultation in 4/1/2022 with new A. fib. Was scheduled for MERT cardioversion outpatient, but on 4/6/2022 presented to the ER and was admitted, treated for CHF and underwent MERT cardioversion. EF was severely reduced at 30%. GDMT has been uptitrated in hospital follow-up with Mike Hui and CHF clinic and has been maintaining sinus rhythm. Most recent EF shows slight improvement to 35-40%. He feels well. He is back in the gym exercising regularly. Main complaint is of arthritis and knee pain, he would like to have a knee replacement. He denies chest pain, shortness of breath, palpitations, syncope, lower extremity edema. Still urinating a lot with low-dose furosemide. Presents today with his wife, both had many questions which were all answered.     Review of Systems:   Negative except as described above    Past Medical History:  Past Medical History:   Diagnosis Date    Arthritis     Environmental allergies     grass mold trees    Hypertension     Renal cell carcinoma [facility]) July 2014    s/p right nephrectomy       Past Surgical History:  Past Surgical History:   Procedure Laterality Date    COLONOSCOPY      DIAGNOSTIC CARDIAC CATH LAB PROCEDURE      HERNIA REPAIR      JOINT REPLACEMENT      beto hips    KNEE CARTILAGE SURGERY  years ago    right    PILONIDAL CYST EXCISION      TESTICLE REMOVAL  1995    TOTAL NEPHRECTOMY  7/22/14    right radical nephrectomy laparoscopic hand assisted with cystoscopy right ureteral catheter insertion        Family History:  Family History   Problem Relation Age of Onset    Heart Failure Father        Social History:  Social History     Tobacco Use    Smoking status: Former     Types: Cigarettes, Pipe    Smokeless tobacco: Never    Tobacco comments:     quit in 1971   Vaping Use    Vaping Use: Never used   Substance Use Topics    Alcohol use: No    Drug use: No        Allergies:  No Known Allergies    Current Medications:    Current Outpatient Medications:     atorvastatin (LIPITOR) 20 MG tablet, Take 1 tablet by mouth daily, Disp: 90 tablet, Rfl: 3    ENTRESTO  MG per tablet, TAKE 1 TABLET TWICE A DAY, Disp: 180 tablet, Rfl: 3    spironolactone (ALDACTONE) 25 MG tablet, TAKE ONE TABLET BY MOUTH EVERY DAY, Disp: 90 tablet, Rfl: 3    acetaminophen (TYLENOL) 500 MG tablet, Take 500 mg by mouth every 6 hours as needed for Pain, Disp: , Rfl:     carvedilol (COREG) 12.5 MG tablet, Take 1 tablet by mouth 2 times daily (with meals), Disp: 180 tablet, Rfl: 3    dapagliflozin (FARXIGA) 10 MG tablet, Take 1 tablet by mouth every morning, Disp: 90 tablet, Rfl: 3    furosemide (LASIX) 20 MG tablet, Take 1 tablet by mouth daily, Disp: 90 tablet, Rfl: 3    apixaban (ELIQUIS) 5 MG TABS tablet, Take 5 mg by mouth 2 times daily, Disp: , Rfl:     sertraline (ZOLOFT) 25 MG tablet, Take 25 mg by mouth every morning , Disp: , Rfl:     LORazepam (ATIVAN) 1 MG tablet, Take 0.5-1 mg by mouth every 12 hours as needed for Anxiety.  , Disp: , Rfl:     APPLE CIDER VINEGAR PO, Take 1 tablet by mouth every morning, Disp: , Rfl:     meloxicam (MOBIC) 15 MG tablet, Take 7.5 mg by mouth in the morning and at bedtime, Disp: , Rfl:     Physical Exam:  BP (!) 132/90   Pulse 70   Resp 16   Ht 6' (1.829 m)   Wt 220 lb 8 oz (100 kg)   BMI 29.91 kg/m²   Wt Readings from Last 3 Encounters:   12/01/22 220 lb 8 oz (100 kg)   11/14/22 222 lb (100.7 kg)   09/13/22 217 lb (98.4 kg)     Appearance: Well-appearing older gentleman, awake, alert and oriented x 3, no acute respiratory distress  Skin: Intact, no rash  Head: Normocephalic, atraumatic  Eyes: EOMI, no conjunctival erythema  ENMT: No pharyngeal erythema, MMM, no rhinorrhea  Neck: Supple, no elevated JVP, no carotid bruits  Lungs: Clear to auscultation bilaterally. No wheezes, rales, or rhonchi. Cardiac: Regular rate and rhythm, +S1S2, no murmurs apparent  Abdomen: Soft, nontender, +bowel sounds  Extremities: Moves all extremities x 4, no lower extremity edema  Neurologic: No focal motor deficits apparent, normal mood and affect, alert and oriented x 3  Peripheral Pulses: Intact posterior tibial pulses bilaterally    Laboratory Tests:  Lab Results   Component Value Date    CREATININE 1.2 2022    BUN 20 2022     2022    K 4.7 2022     2022    CO2 22 2022     No results found for: MG  Lab Results   Component Value Date    WBC 4.1 (L) 2022    HGB 13.5 2022    HCT 40.3 2022    MCV 92.4 2022     2022     Lab Results   Component Value Date    ALT 49 (H) 2022    AST 24 2022    ALKPHOS 95 2022    BILITOT 1.0 2022     Lab Results   Component Value Date    CKTOTAL 74 2014    CKMB 1.9 2014    TROPONINI <0.01 2014     Lab Results   Component Value Date    INR 1.0 2022    INR 1.2 2014    PROTIME 11.2 2022    PROTIME 12.9 (H) 2014     Lab Results   Component Value Date    TSH 1.350 2022     No results found for: LABA1C  No results found for: EAG  No results found for: CHOL  No results found for: TRIG  No results found for: HDL  No results found for: LDLCALC, LDLCHOLESTEROL  No results found for: LABVLDL, VLDL  No results found for: CHOLHDLRATIO  No results for input(s): PROBNP in the last 72 hours. Cardiac Tests:  EC2022: Sinus rhythm 70 bpm.  Left anterior fascicular block. Possible prior septal infarct. Nonspecific T wave changes.     Echocardiogram:   Cope TTE 22   Summary   Limited echo ordered to assess LV function. LV systolic function is moderately reduced. Ejection fraction is visually estimated at 35-40%. Compared to study from 4/2022, LVEF has improved. MERT [place] 4/7/22       Summary   Left ventricle is grossly normal in size. Borderline left ventricular concentric hypertrophy noted. Normal left ventricle systolic function. Mildly dilated left atrium. Mild spontaneous echo contrast was present in LA cavity. Mild mitral regurgitation is present. Mild aortic regurgitation is noted. Cope TTE 4/8/22   Summary   Moderate-to-severe dilated left ventricle. No regional wall motion abnormalities seen with severe generalized   hypokinesis. Severely reduced left ventricular systolic dysfunction. EF 30%   There is doppler evidence of stage II diastolic dysfunction. The left atrium is severely dilated. Moderately enlarged left atrial size. Mild centrally directed mitral regurgitation. Mildly dilated aortic root. Dilation of the ascending aorta. Stress test:    Pharm stress 7/8/22  Gated SPECT left ventricular ejection fraction was calculated to be 34%, with severe global hypokinesis without regional wall motion abnormalities. TID ratio 0.98     Impression:    ECG during the infusion did not change. The myocardial perfusion imaging was normal.    Overall left ventricular systolic function was normal without regional wall motion abnormalities. No transient ischemic dilatation. High risk general pharmacologic stress test based on severe LV dysfunction. 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 Maximum transverse dimension of the supravalvular ascending thoracic aorta   measures 4.5 cm. Four-chamber cardiomegaly without pericardial effusion.        Orders Placed This Encounter   Procedures    CTA CHEST W CONTRAST    EKG 12 Lead        Requested Prescriptions      No prescriptions requested or ordered in this encounter        ASSESSMENT / PLAN:  Paroxysmal atrial fibrillation. Noted 3/2022. Maintaining sinus, on apixaban  MERT/DCC 4/6/2022  Chronic heart failure with reduced ejection fraction. Euvolemic  Presumed nonischemic cardiomyopathy, EF 30% -> 35-40%. Unclear etiology ischemic versus arrhythmia versus other  Left anterior fascicular block  Mild nonobstructive CAD by remote heart cath. SPECT MPS no ischemia 7/2022  Dilated ascending aorta, 4.5 cm by CT 8/2022  Hypertension  History of renal cell cancer status post right nephrectomy  [place], recently started on CPAP    Recommendations:  Doing very well from cardiac standpoint, maintaining sinus rhythm and appears euvolemic. No evidence of decompensated heart failure or anginal symptoms. He is easily able to exert greater than 4 METS.     Continue GDMT  Carvedilol 12.5 mg twice daily  Sacubitril/valsartan  mg twice daily  Spironolactone 25 mg daily  Dapagliflozin 10 mg daily  Continue furosemide 20 mg daily  Continue statin  Repeat echo sometime next year to monitor EF on GDMT and with maintaining sinus  Monitor TAA - at 4.5 cm repeat CT in 6 month intervals (2/2023); discussed that surgical repair typically indicated at 5.5 cm, or if rapid expansion demonstrated, defined as greater than 0.5 cm/year  Continue apixaban for stroke risk reduction  Recommend discontinuation of meloxicam  Regarding possible knee surgery: He is well compensated from cardiac standpoint  RCRI class II risk, 0.9% risk of major cardiac event  May proceed with surgery without further cardiac testing  Aggressive risk factor modification  Follow-up in 6 months or sooner if need arises    Discussed at length with patient and wife    Greater than 40 minutes spent on this encounter    The patient's current medication list, allergies, problem list and results of all previously ordered testing were reviewed at today's visit.     Karen Early MD, [facility]

## 2023-01-09 DIAGNOSIS — R06.02 SHORTNESS OF BREATH: Primary | ICD-10-CM

## 2023-01-27 ENCOUNTER — HOSPITAL ENCOUNTER (OUTPATIENT)
Dept: OTHER | Age: 78
Setting detail: THERAPIES SERIES
Discharge: HOME | End: 2023-01-27
Payer: MEDICARE

## 2023-01-27 VITALS
RESPIRATION RATE: 16 BRPM | HEART RATE: 68 BPM | BODY MASS INDEX: 30.65 KG/M2 | OXYGEN SATURATION: 95 % | SYSTOLIC BLOOD PRESSURE: 169 MMHG | WEIGHT: 226 LBS | DIASTOLIC BLOOD PRESSURE: 98 MMHG

## 2023-01-27 LAB
ANION GAP SERPL CALCULATED.3IONS-SCNC: 10 MMOL/L (ref 7–16)
BUN BLDV-MCNC: 24 MG/DL (ref 6–23)
CALCIUM SERPL-MCNC: 9.4 MG/DL (ref 8.6–10.2)
CHLORIDE BLD-SCNC: 101 MMOL/L (ref 98–107)
CO2: 25 MMOL/L (ref 22–29)
CREAT SERPL-MCNC: 1.2 MG/DL (ref 0.7–1.2)
GFR SERPL CREATININE-BSD FRML MDRD: >60 ML/MIN/1.73
GLUCOSE BLD-MCNC: 82 MG/DL (ref 74–99)
POTASSIUM SERPL-SCNC: 4.4 MMOL/L (ref 3.5–5)
PRO-BNP: 434 PG/ML (ref 0–450)
SODIUM BLD-SCNC: 136 MMOL/L (ref 132–146)

## 2023-01-27 PROCEDURE — 2580000003 HC RX 258

## 2023-01-27 PROCEDURE — 36415 COLL VENOUS BLD VENIPUNCTURE: CPT

## 2023-01-27 PROCEDURE — 96374 THER/PROPH/DIAG INJ IV PUSH: CPT

## 2023-01-27 PROCEDURE — 6360000002 HC RX W HCPCS

## 2023-01-27 PROCEDURE — 80048 BASIC METABOLIC PNL TOTAL CA: CPT

## 2023-01-27 PROCEDURE — 99214 OFFICE O/P EST MOD 30 MIN: CPT

## 2023-01-27 PROCEDURE — 83880 ASSAY OF NATRIURETIC PEPTIDE: CPT

## 2023-01-27 RX ORDER — SODIUM CHLORIDE 0.9 % (FLUSH) 0.9 %
SYRINGE (ML) INJECTION
Status: COMPLETED
Start: 2023-01-27 | End: 2023-01-27

## 2023-01-27 RX ORDER — SODIUM CHLORIDE 0.9 % (FLUSH) 0.9 %
10 SYRINGE (ML) INJECTION ONCE
Status: COMPLETED | OUTPATIENT
Start: 2023-01-27 | End: 2023-01-27

## 2023-01-27 RX ORDER — FUROSEMIDE 10 MG/ML
INJECTION INTRAMUSCULAR; INTRAVENOUS
Status: COMPLETED
Start: 2023-01-27 | End: 2023-01-27

## 2023-01-27 RX ORDER — FUROSEMIDE 10 MG/ML
40 INJECTION INTRAMUSCULAR; INTRAVENOUS ONCE
Status: COMPLETED | OUTPATIENT
Start: 2023-01-27 | End: 2023-01-27

## 2023-01-27 RX ADMIN — SODIUM CHLORIDE, PRESERVATIVE FREE 10 ML: 5 INJECTION INTRAVENOUS at 12:25

## 2023-01-27 RX ADMIN — Medication 10 ML: at 12:25

## 2023-01-27 RX ADMIN — FUROSEMIDE 40 MG: 10 INJECTION, SOLUTION INTRAMUSCULAR; INTRAVENOUS at 12:25

## 2023-01-27 RX ADMIN — FUROSEMIDE 40 MG: 10 INJECTION INTRAMUSCULAR; INTRAVENOUS at 12:25

## 2023-01-27 NOTE — PROGRESS NOTES
Congestive Heart Failure [place] [address] [facility]  [address] [facility]   1945    Referring Provider: Fuad Corral  Primary Care Physician: Mike Hays  Cardiologist: Margarita Saldivar  Nephrologist:     History of Present Illness:     Eliane Pennington is a 76 y.o. male with a history of HFrEF, most recent EF 35-40% on 9/15/2022. Patient Story:    He does  have dyspnea with exertion, shortness of breath, or decline in overall functional capacity. He does not have orthopnea, PND, nocturnal cough or hemoptysis. He does not have abdominal distention or bloating, early satiety, anorexia/change in appetite. He does have a good urinary response to oral diuretic. He does have lower extremity edema. He denies lightheadedness, dizziness. He denies palpitations, syncope or near syncope. He does not complain of chest pain, pressure, discomfort. No Known Allergies      Prior to Visit Medications    Medication Sig Taking?  Authorizing Provider   atorvastatin (LIPITOR) 20 MG tablet Take 1 tablet by mouth daily  Prince Aceves MD   ENTRESTO  MG per tablet TAKE 1 TABLET TWICE A DAY  Alexandra Acosta MD   spironolactone (ALDACTONE) 25 MG tablet TAKE ONE TABLET BY MOUTH EVERY DAY  Alexandra Acosta MD   acetaminophen (TYLENOL) 500 MG tablet Take 500 mg by mouth every 6 hours as needed for Pain  Historical Provider, MD   carvedilol (COREG) 12.5 MG tablet Take 1 tablet by mouth 2 times daily (with meals)  JOSE Kumar CNP   dapagliflozin (FARXIGA) 10 MG tablet Take 1 tablet by mouth every morning  JOSE Kumar - CNP   furosemide (LASIX) 20 MG tablet Take 1 tablet by mouth daily  Alexandra Acosta MD   apixaban (ELIQUIS) 5 MG TABS tablet Take 5 mg by mouth 2 times daily  Historical Provider, MD   sertraline (ZOLOFT) 25 MG tablet Take 25 mg by mouth every morning   Historical Provider, MD   LORazepam (ATIVAN) 1 MG tablet Take 0.5-1 mg by mouth every 12 hours as needed for Anxiety. Historical Provider, MD   APPLE CIDER VINEGAR PO Take 1 tablet by mouth every morning  Historical Provider, MD   meloxicam (MOBIC) 15 MG tablet Take 7.5 mg by mouth in the morning and at bedtime  Historical Provider, MD     Guideline directed medical:  ARNI/ACE I/ARB: Yes  Beta blocker: Yes  Aldosterone antagonist:  Yes  SGLT2i: yes    Physical Examination:     BP (!) 169/98   Pulse 68   Resp 16   Wt 226 lb (102.5 kg)   SpO2 95%   BMI 30.65 kg/m²     Assessment  Charting Type: Shift assessment (chf clinic)    Neurological  Level of Consciousness: Alert (0)       HEENT (Head, Ears, Eyes, Nose, & Throat)  HEENT (WDL): Exceptions to WDL  Right Eye: Glasses, Impaired vision  Left Eye: Impaired vision, Glasses    Respiratory  Respiratory Pattern: Regular  Respiratory Depth: Normal  Respiratory Quality/Effort: Dyspnea with exertion  L Breath Sounds: Clear  R Breath Sounds: Clear              Cardiac  Cardiac Rhythm: Sinus rhythm    Rhythm Interpretation  Heart Rate: 68         Gastrointestinal  Abdominal (WDL): Within Defined Limits  Abdomen Inspection: Soft  RUQ Bowel Sounds: Active  LUQ Bowel Sounds: Active  RLQ Bowel Sounds: Active  LLQ Bowel Sounds: Active          Bowel Sounds  RUQ Bowel Sounds: Active  LUQ Bowel Sounds: Active  RLQ Bowel Sounds: Active  LLQ Bowel Sounds:  Active    Peripheral Vascular  Peripheral Vascular (WDL): Exceptions to WDL  Edema: None  RLE Edema: +2, Pitting  LLE Edema: +1, Pitting                   Genitourinary  Genitourinary (WDL): Within Defined Limits    Psychosocial  Psychosocial (WDL): Within Defined Limits                        Heart Rate: 68                     LAB DATA:    Last 3 BMP      Sodium (mmol/L)   Date Value   01/27/2023 136   11/14/2022 136   09/13/2022 136     Potassium (mmol/L)   Date Value   01/27/2023 4.4   11/14/2022 4.7   09/13/2022 4.0     Potassium reflex Magnesium (mmol/L)   Date Value   04/07/2022 3.7   04/06/2022 3.8   03/25/2022 3.6 Chloride (mmol/L)   Date Value   01/27/2023 101   11/14/2022 104   09/13/2022 103     CO2 (mmol/L)   Date Value   01/27/2023 25   11/14/2022 22   09/13/2022 24     BUN (mg/dL)   Date Value   01/27/2023 24 (H)   12/07/2022 24 (H)   11/14/2022 20     Glucose (mg/dL)   Date Value   01/27/2023 82   11/14/2022 105 (H)   09/13/2022 124 (H)     Calcium (mg/dL)   Date Value   01/27/2023 9.4   11/14/2022 9.3   09/13/2022 9.5     Last 3 BNP       Pro-BNP (pg/mL)   Date Value   01/27/2023 434   11/14/2022 503 (H)   09/13/2022 462 (H)      CBC: No results for input(s): WBC, HGB, PLT in the last 72 hours. BMP:    Recent Labs     01/27/23  1240      K 4.4      CO2 25   BUN 24*   CREATININE 1.2   GLUCOSE 82           Hepatic: No results for input(s): AST, ALT, ALB, BILITOT, ALKPHOS in the last 72 hours. Troponin: No results for input(s): TROPONINI in the last 72 hours. BNP: No results for input(s): BNP in the last 72 hours. Lipids: No results for input(s): CHOL, HDL in the last 72 hours. Invalid input(s): LDLCALCU  INR: No results for input(s): INR in the last 72 hours. WEIGHTS:    Wt Readings from Last 3 Encounters:   01/27/23 226 lb (102.5 kg)   12/01/22 220 lb 8 oz (100 kg)   11/14/22 222 lb (100.7 kg)     TELEMETRY:  Cardiac Regularity: Regular  Cardiac Rhythm/Interpretation: SR    ASSESSMENT:  Soila Peng has increase in bilateral lower leg edema, increased weight, and increase in fatigue. He reports he normally goes to the [facility] almost daily and has not been able to go in the past week and has also noticed increased blood pressure at home which concerned him so he called for urgent same day appointment today.       Interventions completed this visit:  IV diuretics given-yes, Lasix 40 mg IVP  Lab work obtained yes, proBNP/BMP   Reviewed currently prescribed medications with patient, educated on importance of compliance and answered any questions regarding their medication  Educated on signs and symptoms of HF  Educated on low sodium diet    PLAN:  Scheduled to follow up in CHF clinic on   Future Appointments   Date Time Provider Kristen Anders   2/6/2023  9:30 AM [facility] ROOM 1 [facility]   2/6/2023 11:00 AM [facility] CT2  MADDY MARADIAGA [facility]   [facility]     Given clinic phone number [phone] and aware of signs and symptoms to call with any HF change in symptoms.

## 2023-01-30 ENCOUNTER — TELEPHONE (OUTPATIENT)
Dept: CARDIOLOGY CLINIC | Age: 78
End: 2023-01-30

## 2023-01-30 NOTE — TELEPHONE ENCOUNTER
Patient called stating that he was seen in the [address] [place] [address] on 1/27 due to increase weight, leg edema. He was given IV Lasix. He states that over the weekend, he took Lasix 40 mg daily (prescribed 20 mg daily) and today he only took the prescribed 20 mg. He states he is down 1 pound and his edema has resolved. He is asking if he should continue to self-medicate or if his Lasix dose should be increased to 40 mg daily. Please advise.

## 2023-02-01 ENCOUNTER — TELEPHONE (OUTPATIENT)
Dept: CARDIOLOGY CLINIC | Age: 78
End: 2023-02-01

## 2023-02-01 NOTE — TELEPHONE ENCOUNTER
Patient having CTA chest with/without contrast to monitor TAA on 2/6/2023. Please obtain authorization and advise.   Thank you!!    CPT  40429  ICD  I71.2

## 2023-02-01 NOTE — TELEPHONE ENCOUNTER
Contacted patient's wife with recommendations per Dr. Lissett Medel. She verbalized understanding and will advise patient.

## 2023-02-01 NOTE — TELEPHONE ENCOUNTER
Per [employer] CPT 35889 has been approved. Order #[id number]. Valid 02/01/2023-05/01/2023. Scanned into chart.

## 2023-02-01 NOTE — TELEPHONE ENCOUNTER
Kidney function and electrolytes look normal and his proBNP was actually down. I think it would be okay to continue 20 mg daily, watch his salt intake, and take additional 20 as needed but if he finds that he is taking the additional 20 more than a few times a week at that point would consider increasing his daily dose to 40. Let us know how he is feeling.

## 2023-02-06 ENCOUNTER — TELEPHONE (OUTPATIENT)
Dept: CARDIOLOGY CLINIC | Age: 78
End: 2023-02-06

## 2023-02-06 ENCOUNTER — HOSPITAL ENCOUNTER (OUTPATIENT)
Dept: OTHER | Age: 78
Setting detail: THERAPIES SERIES
Discharge: HOME | End: 2023-02-06
Payer: MEDICARE

## 2023-02-06 ENCOUNTER — HOSPITAL ENCOUNTER (OUTPATIENT)
Dept: CT IMAGING | Age: 78
Discharge: HOME | End: 2023-02-08
Payer: MEDICARE

## 2023-02-06 VITALS
OXYGEN SATURATION: 96 % | HEART RATE: 63 BPM | RESPIRATION RATE: 17 BRPM | BODY MASS INDEX: 31.06 KG/M2 | SYSTOLIC BLOOD PRESSURE: 164 MMHG | DIASTOLIC BLOOD PRESSURE: 94 MMHG | WEIGHT: 229 LBS

## 2023-02-06 DIAGNOSIS — I71.21 ANEURYSM OF ASCENDING AORTA WITHOUT RUPTURE: ICD-10-CM

## 2023-02-06 LAB
ANION GAP SERPL CALCULATED.3IONS-SCNC: 9 MMOL/L (ref 7–16)
BUN BLDV-MCNC: 25 MG/DL (ref 6–23)
CALCIUM SERPL-MCNC: 9.5 MG/DL (ref 8.6–10.2)
CHLORIDE BLD-SCNC: 102 MMOL/L (ref 98–107)
CO2: 25 MMOL/L (ref 22–29)
CREAT SERPL-MCNC: 1.1 MG/DL (ref 0.7–1.2)
GFR SERPL CREATININE-BSD FRML MDRD: >60 ML/MIN/1.73
GLUCOSE BLD-MCNC: 79 MG/DL (ref 74–99)
POTASSIUM SERPL-SCNC: 4.4 MMOL/L (ref 3.5–5)
PRO-BNP: 349 PG/ML (ref 0–450)
SODIUM BLD-SCNC: 136 MMOL/L (ref 132–146)

## 2023-02-06 PROCEDURE — 83880 ASSAY OF NATRIURETIC PEPTIDE: CPT

## 2023-02-06 PROCEDURE — 80048 BASIC METABOLIC PNL TOTAL CA: CPT

## 2023-02-06 PROCEDURE — 36415 COLL VENOUS BLD VENIPUNCTURE: CPT

## 2023-02-06 PROCEDURE — 99214 OFFICE O/P EST MOD 30 MIN: CPT

## 2023-02-06 PROCEDURE — 6360000004 HC RX CONTRAST MEDICATION: Performed by: RADIOLOGY

## 2023-02-06 PROCEDURE — 71275 CT ANGIOGRAPHY CHEST: CPT

## 2023-02-06 RX ADMIN — IOPAMIDOL 80 ML: 755 INJECTION, SOLUTION INTRAVENOUS at 11:03

## 2023-02-06 NOTE — PROGRESS NOTES
Congestive Heart Failure [place] [address] [facility]  [address] [facility]   1945    Referring Provider: Daphne Sharma  Primary Care Physician: Rebecca Huynh  Cardiologist: Dave Kirkland  Nephrologist:     History of Present Illness:     Rafael Mazariegos is a 76 y.o. male with a history of HFrEF, most recent EF 35-40% on 9/15/2022. Patient Story:    He does  have dyspnea with exertion, shortness of breath, or decline in overall functional capacity. He does not have orthopnea, PND, nocturnal cough or hemoptysis. He does not have abdominal distention or bloating, early satiety, anorexia/change in appetite. He does have a good urinary response to oral diuretic. He does not have lower extremity edema. He denies lightheadedness, dizziness. He denies palpitations, syncope or near syncope. He does not complain of chest pain, pressure, discomfort. No Known Allergies      Prior to Visit Medications    Medication Sig Taking?  Authorizing Provider   atorvastatin (LIPITOR) 20 MG tablet Take 1 tablet by mouth daily  Saul Henriquez MD   ENTRESTO  MG per tablet TAKE 1 TABLET TWICE A DAY  Sha Smith MD   spironolactone (ALDACTONE) 25 MG tablet TAKE ONE TABLET BY MOUTH EVERY DAY  Sha Smith MD   acetaminophen (TYLENOL) 500 MG tablet Take 500 mg by mouth every 6 hours as needed for Pain  Historical Provider, MD   carvedilol (COREG) 12.5 MG tablet Take 1 tablet by mouth 2 times daily (with meals)  Linsey Courser, APRN - CNP   dapagliflozin (FARXIGA) 10 MG tablet Take 1 tablet by mouth every morning  Linsey Courser, APRN - CNP   furosemide (LASIX) 20 MG tablet Take 1 tablet by mouth daily  Sha Smith MD   apixaban (ELIQUIS) 5 MG TABS tablet Take 5 mg by mouth 2 times daily  Historical Provider, MD   sertraline (ZOLOFT) 25 MG tablet Take 25 mg by mouth every morning   Historical Provider, MD   LORazepam (ATIVAN) 1 MG tablet Take 0.5-1 mg by mouth every 12 hours as needed for Anxiety. Historical Provider, MD   APPLE CIDER VINEGAR PO Take 1 tablet by mouth every morning  Historical Provider, MD   meloxicam (MOBIC) 15 MG tablet Take 7.5 mg by mouth in the morning and at bedtime  Historical Provider, MD     Guideline directed medical:  ARNI/ACE I/ARB: Yes  Beta blocker: Yes  Aldosterone antagonist:  Yes  SGLT2i: yes    Physical Examination:     BP (!) 164/94   Pulse 63   Resp 17   Wt 229 lb (103.9 kg)   SpO2 96%   BMI 31.06 kg/m²                       Respiratory  Respiratory Pattern: Regular  Respiratory Depth: Normal  L Breath Sounds: Clear  R Breath Sounds: Clear              Cardiac  Cardiac Rhythm: Sinus rhythm    Rhythm Interpretation  Heart Rate: 63         Gastrointestinal  RUQ Bowel Sounds: Active  LUQ Bowel Sounds: Active  RLQ Bowel Sounds: Active  LLQ Bowel Sounds: Active          Bowel Sounds  RUQ Bowel Sounds: Active  LUQ Bowel Sounds: Active  RLQ Bowel Sounds: Active  LLQ Bowel Sounds:  Active    Peripheral Vascular  Edema: None                   Genitourinary  Genitourinary (WDL): Within Defined Limits    Psychosocial  Psychosocial (WDL): Within Defined Limits                        Heart Rate: 63                     LAB DATA:    Last 3 BMP      Sodium (mmol/L)   Date Value   02/06/2023 136   01/27/2023 136   11/14/2022 136     Potassium (mmol/L)   Date Value   02/06/2023 4.4   01/27/2023 4.4   11/14/2022 4.7     Potassium reflex Magnesium (mmol/L)   Date Value   04/07/2022 3.7   04/06/2022 3.8   03/25/2022 3.6     Chloride (mmol/L)   Date Value   02/06/2023 102   01/27/2023 101   11/14/2022 104     CO2 (mmol/L)   Date Value   02/06/2023 25   01/27/2023 25   11/14/2022 22     BUN (mg/dL)   Date Value   02/06/2023 25 (H)   01/27/2023 24 (H)   12/07/2022 24 (H)     Glucose (mg/dL)   Date Value   02/06/2023 79   01/27/2023 82   11/14/2022 105 (H)     Calcium (mg/dL)   Date Value   02/06/2023 9.5   01/27/2023 9.4   11/14/2022 9.3     Last 3 BNP       Pro-BNP (pg/mL)   Date Value   02/06/2023 349   01/27/2023 434   11/14/2022 503 (H)      CBC: No results for input(s): WBC, HGB, PLT in the last 72 hours. BMP:    Recent Labs     02/06/23  1006      K 4.4      CO2 25   BUN 25*   CREATININE 1.1   GLUCOSE 79             Hepatic: No results for input(s): AST, ALT, ALB, BILITOT, ALKPHOS in the last 72 hours. Troponin: No results for input(s): TROPONINI in the last 72 hours. BNP: No results for input(s): BNP in the last 72 hours. Lipids: No results for input(s): CHOL, HDL in the last 72 hours. Invalid input(s): LDLCALCU  INR: No results for input(s): INR in the last 72 hours. WEIGHTS:    Wt Readings from Last 3 Encounters:   02/06/23 229 lb (103.9 kg)   01/27/23 226 lb (102.5 kg)   12/01/22 220 lb 8 oz (100 kg)     TELEMETRY:  Cardiac Regularity: Regular  Cardiac Rhythm/Interpretation: SR    ASSESSMENT:  Neri Randall weight is up 3 pounds since his last visit with us. He states he ate a lot of sodium this past weekend and he took and extra water pill this morning because his weight was up 1 pound on his home scale. Patient states he has been monitoring his BP at home and his pressure has been elevated as of lately. He is going today to have a CTA of his chest. He denies SOB, or chest pain and has no edema on assessment. Dr. Kylah Cooley office was notified of patients blood pressure due to his aortic aneurysm. Rolena Runner has a PRN order to take an extra lasix at home.  He only needed one extra dose since we saw him last.       Interventions completed this visit:  IV diuretics given-yes, Lasix 40 mg IVP  Lab work obtained yes, proBNP/BMP   Reviewed currently prescribed medications with patient, educated on importance of compliance and answered any questions regarding their medication  Educated on signs and symptoms of HF  Educated on low sodium diet    PLAN:  Scheduled to follow up in CHF clinic on   Future Appointments   Date Time Provider Kristen Anders [facility]     Given clinic phone number [phone] and aware of signs and symptoms to call with any HF change in symptoms.

## 2023-02-06 NOTE — PLAN OF CARE
Problem: Discharge Planning  Goal: Discharge to home or other facility with appropriate resources  Outcome: Progressing   CHF continue plan of care

## 2023-02-06 NOTE — TELEPHONE ENCOUNTER
Patient with TAA seen in CHF Clinic today. Patient states his pressures at home are in the 140s. However, today and for the past few visits to the Clinic, his systolic has been running in the 160 range. Please advise any recommendations.

## 2023-02-14 ENCOUNTER — TELEPHONE (OUTPATIENT)
Dept: CARDIOLOGY CLINIC | Age: 78
End: 2023-02-14

## 2023-02-14 RX ORDER — CARVEDILOL 25 MG/1
25 TABLET ORAL 2 TIMES DAILY WITH MEALS
Qty: 60 TABLET | Refills: 5 | Status: SHIPPED | OUTPATIENT
Start: 2023-02-14

## 2023-02-14 NOTE — TELEPHONE ENCOUNTER
----- Message from Linnea Casiano MD sent at 2/13/2023  3:59 PM EST -----  Blood work looks good, kidney function very stable and proBNP within normal range.

## 2023-02-14 NOTE — TELEPHONE ENCOUNTER
Contacted patient's wife with results and recommendations per Dr. Pineda Smith. She verbalized understanding and will advise patient. Script for increased dose of carvedilol forwarded to Dr. Pineda Smith for signature.

## 2023-02-14 NOTE — TELEPHONE ENCOUNTER
----- Message from Tarsha Arriaga MD sent at 2/13/2023  3:59 PM EST -----  Thoracic aortic aneurysm, stable at 4.5 cm unchanged from prior. Recommend repeat in 6 months. Blood pressure control very important given presence of the aneurysm, if still running high would increase carvedilol to 25 mg twice daily.

## 2023-02-23 ENCOUNTER — HOSPITAL ENCOUNTER (OUTPATIENT)
Dept: OTHER | Age: 78
Setting detail: THERAPIES SERIES
Discharge: HOME | End: 2023-02-23
Payer: MEDICARE

## 2023-03-02 ENCOUNTER — TELEPHONE (OUTPATIENT)
Dept: CARDIOLOGY CLINIC | Age: 78
End: 2023-03-02

## 2023-03-02 NOTE — TELEPHONE ENCOUNTER
Pt called to sched his 6mo FU. He inquired if Randal De Leon would like pt to complete and Echo prior to appt. 06/01/2023. Please advise.  Thank you

## 2023-03-06 ENCOUNTER — TELEPHONE (OUTPATIENT)
Dept: CARDIOLOGY CLINIC | Age: 78
End: 2023-03-06

## 2023-03-06 ENCOUNTER — HOSPITAL ENCOUNTER (OUTPATIENT)
Dept: OTHER | Age: 78
Setting detail: THERAPIES SERIES
Discharge: HOME | End: 2023-03-06
Payer: MEDICARE

## 2023-03-06 VITALS
HEART RATE: 63 BPM | WEIGHT: 227 LBS | DIASTOLIC BLOOD PRESSURE: 94 MMHG | RESPIRATION RATE: 18 BRPM | SYSTOLIC BLOOD PRESSURE: 155 MMHG | OXYGEN SATURATION: 94 % | BODY MASS INDEX: 30.79 KG/M2

## 2023-03-06 DIAGNOSIS — R94.30 EJECTION FRACTION < 50%: Primary | ICD-10-CM

## 2023-03-06 DIAGNOSIS — R06.02 SHORTNESS OF BREATH: ICD-10-CM

## 2023-03-06 LAB
ANION GAP SERPL CALCULATED.3IONS-SCNC: 9 MMOL/L (ref 7–16)
BUN BLDV-MCNC: 19 MG/DL (ref 6–23)
CALCIUM SERPL-MCNC: 9.3 MG/DL (ref 8.6–10.2)
CHLORIDE BLD-SCNC: 104 MMOL/L (ref 98–107)
CO2: 24 MMOL/L (ref 22–29)
CREAT SERPL-MCNC: 1 MG/DL (ref 0.7–1.2)
GFR SERPL CREATININE-BSD FRML MDRD: >60 ML/MIN/1.73
GLUCOSE BLD-MCNC: 87 MG/DL (ref 74–99)
POTASSIUM SERPL-SCNC: 4.4 MMOL/L (ref 3.5–5)
PRO-BNP: 314 PG/ML (ref 0–450)
SODIUM BLD-SCNC: 137 MMOL/L (ref 132–146)

## 2023-03-06 PROCEDURE — 99214 OFFICE O/P EST MOD 30 MIN: CPT

## 2023-03-06 PROCEDURE — 36415 COLL VENOUS BLD VENIPUNCTURE: CPT

## 2023-03-06 PROCEDURE — 80048 BASIC METABOLIC PNL TOTAL CA: CPT

## 2023-03-06 PROCEDURE — 83880 ASSAY OF NATRIURETIC PEPTIDE: CPT

## 2023-03-06 NOTE — RESULT ENCOUNTER NOTE
Blood work looks normal.  Yes can have echo done prior to next appointment. Have him check blood pressures at home over the next week and let us know, if continuing to run high I would recommend increasing the spironolactone from 25 to 50 mg daily.

## 2023-03-06 NOTE — TELEPHONE ENCOUNTER
Patient seen in CHF Clinic today. Coreg recently increased to 25 mg twice daily. BP still elevated at 155/94. Please review chart note and labs and advise.

## 2023-03-06 NOTE — PROGRESS NOTES
Congestive Heart Failure Clinic   [facility]  [phone]      Neri Fields   1945    Referring Provider: Kahlil  Primary Care Physician: Logan  Cardiologist: Michael  Nephrologist:     History of Present Illness:     Neri Fields is a 77 y.o. male with a history of HFrEF, most recent EF 35-40% on 9/15/2022.    Patient Story:    He does  have dyspnea with exertion, shortness of breath, or decline in overall functional capacity. He does not have orthopnea, PND, nocturnal cough or hemoptysis. He does not have abdominal distention or bloating, early satiety, anorexia/change in appetite. He does have a good urinary response to oral diuretic. He does not have lower extremity edema. He denies lightheadedness, dizziness. He denies palpitations, syncope or near syncope. He does not complain of chest pain, pressure, discomfort.     No Known Allergies      Prior to Visit Medications    Medication Sig Taking? Authorizing Provider   diclofenac sodium (VOLTAREN) 1 % GEL Apply topically daily Yes Historical Provider, MD   carvedilol (COREG) 25 MG tablet Take 1 tablet by mouth 2 times daily (with meals)  Flaquito Rodriguez MD   atorvastatin (LIPITOR) 20 MG tablet Take 1 tablet by mouth daily  Neri Anguiano MD   ENTRESTO  MG per tablet TAKE 1 TABLET TWICE A DAY  Flaquito Rodriguez MD   spironolactone (ALDACTONE) 25 MG tablet TAKE ONE TABLET BY MOUTH EVERY DAY  Flaquito Rodriguez MD   acetaminophen (TYLENOL) 500 MG tablet Take 500 mg by mouth every 6 hours as needed for Pain  Historical Provider, MD   dapagliflozin (FARXIGA) 10 MG tablet Take 1 tablet by mouth every morning  Aggie Guillory, APRN - CNP   furosemide (LASIX) 20 MG tablet Take 1 tablet by mouth daily  Flaquito Rodriguez MD   apixaban (ELIQUIS) 5 MG TABS tablet Take 5 mg by mouth 2 times daily  Historical Provider, MD   sertraline (ZOLOFT) 25 MG tablet Take 25 mg by mouth every morning   Historical  Provider, MD   LORazepam (ATIVAN) 1 MG tablet Take 0.5-1 mg by mouth every 12 hours as needed for Anxiety. Historical Provider, MD   APPLE CIDER VINEGAR PO Take 1 tablet by mouth every morning  Historical Provider, MD   meloxicam (MOBIC) 15 MG tablet Take 7.5 mg by mouth in the morning and at bedtime  Historical Provider, MD     Guideline directed medical:  ARNI/ACE I/ARB: Yes  Beta blocker: Yes  Aldosterone antagonist:  Yes  SGLT2i: yes    Physical Examination:     BP (!) 155/94   Pulse 63   Resp 18   Wt 227 lb (103 kg)   SpO2 94%   BMI 30.79 kg/m²                       Respiratory  Respiratory Pattern: Regular  Respiratory Depth: Normal  L Breath Sounds: Clear  R Breath Sounds: Clear              Cardiac  Cardiac Rhythm: Sinus rhythm    Rhythm Interpretation  Heart Rate: 63         Gastrointestinal  RUQ Bowel Sounds: Active  LUQ Bowel Sounds: Active  RLQ Bowel Sounds: Active  LLQ Bowel Sounds: Active          Bowel Sounds  RUQ Bowel Sounds: Active  LUQ Bowel Sounds: Active  RLQ Bowel Sounds: Active  LLQ Bowel Sounds:  Active    Peripheral Vascular  RLE Edema: Trace  LLE Edema: None                   Genitourinary  Genitourinary (WDL): Within Defined Limits    Psychosocial  Psychosocial (WDL): Within Defined Limits                        Heart Rate: 63                     LAB DATA:    Last 3 BMP      Sodium (mmol/L)   Date Value   03/06/2023 137   02/06/2023 136   01/27/2023 136     Potassium (mmol/L)   Date Value   03/06/2023 4.4   02/06/2023 4.4   01/27/2023 4.4     Potassium reflex Magnesium (mmol/L)   Date Value   04/07/2022 3.7   04/06/2022 3.8   03/25/2022 3.6     Chloride (mmol/L)   Date Value   03/06/2023 104   02/06/2023 102   01/27/2023 101     CO2 (mmol/L)   Date Value   03/06/2023 24   02/06/2023 25   01/27/2023 25     BUN (mg/dL)   Date Value   03/06/2023 19   02/06/2023 25 (H)   01/27/2023 24 (H)     Glucose (mg/dL)   Date Value   03/06/2023 87   02/06/2023 79   01/27/2023 82     Calcium (mg/dL)   Date Value   03/06/2023 9.3   02/06/2023 9.5   01/27/2023 9.4     Last 3 BNP       Pro-BNP (pg/mL)   Date Value   03/06/2023 314   02/06/2023 349   01/27/2023 434      CBC: No results for input(s): WBC, HGB, PLT in the last 72 hours. BMP:    Recent Labs     03/06/23  0820      K 4.4      CO2 24   BUN 19   CREATININE 1.0   GLUCOSE 87               Hepatic: No results for input(s): AST, ALT, ALB, BILITOT, ALKPHOS in the last 72 hours. Troponin: No results for input(s): TROPONINI in the last 72 hours. BNP: No results for input(s): BNP in the last 72 hours. Lipids: No results for input(s): CHOL, HDL in the last 72 hours. Invalid input(s): LDLCALCU  INR: No results for input(s): INR in the last 72 hours. WEIGHTS:    Wt Readings from Last 3 Encounters:   03/06/23 227 lb (103 kg)   02/06/23 229 lb (103.9 kg)   01/27/23 226 lb (102.5 kg)     TELEMETRY:  Cardiac Regularity: Regular  Cardiac Rhythm/Interpretation: SR    ASSESSMENT:  Neri Randall weight is down 2 pounds since his last visit with us. Patient states he has been monitoring his BP at home due to having a AAA. His coreg was recently increased from 12.5 mg to 25 mg daily and his pressure in the clinic today is still 155/93. His most recent CTA of his chest showed no change to the aneursym. He denies SOB, or chest pain. Dr. Massimo Mckeon office was notified of patients blood pressure due to his aortic aneurysm. Corrie Shane has a PRN order to take an extra lasix at home. He takes about 2 extra doses per week when needed.        Interventions completed this visit:  IV diuretics given- no   Lab work obtained yes, proBNP/BMP   Reviewed currently prescribed medications with patient, educated on importance of compliance and answered any questions regarding their medication  Educated on signs and symptoms of HF  Educated on low sodium diet    PLAN:  Scheduled to follow up in CHF clinic on   Future Appointments   Date Time Provider Department [place]   4/3/2023  9:45 AM [facility] ROOM 2 [facility]   6/1/2023 10:45 AM Jacqui Hollins MD [address] [facility]     Given clinic phone number [phone] and aware of signs and symptoms to call with any HF change in symptoms.

## 2023-03-06 NOTE — TELEPHONE ENCOUNTER
----- Message from Junior Vásquez MD sent at 3/6/2023  2:38 PM EST -----  Blood work looks normal.  Yes can have echo done prior to next appointment. Have him check blood pressures at home over the next week and let us know, if continuing to run high I would recommend increasing the spironolactone from 25 to 50 mg daily.

## 2023-03-07 NOTE — TELEPHONE ENCOUNTER
Contacted patient with results and recommendations per Dr. Walter Campbell. Patient and wife verbalized understanding.       ----- Message from Joseph Friedman MD sent at 3/6/2023  2:38 PM EST -----  Blood work looks normal.  Yes can have echo done prior to next appointment. Have him check blood pressures at home over the next week and let us know, if continuing to run high I would recommend increasing the spironolactone from 25 to 50 mg daily.

## 2023-03-08 ENCOUNTER — TELEPHONE (OUTPATIENT)
Dept: CARDIOLOGY CLINIC | Age: 78
End: 2023-03-08

## 2023-03-08 DIAGNOSIS — I50.20 HFREF (HEART FAILURE WITH REDUCED EJECTION FRACTION) (HCC): ICD-10-CM

## 2023-03-08 RX ORDER — SPIRONOLACTONE 50 MG/1
50 TABLET, FILM COATED ORAL DAILY
Qty: 30 TABLET | Refills: 5 | Status: SHIPPED | OUTPATIENT
Start: 2023-03-08

## 2023-03-08 NOTE — TELEPHONE ENCOUNTER
Contacted patient with recommendations per Dr. Nash Manley. He verbalized understanding. Script for increased dose of spironolactone forwarded to Dr. Nash Manley for signature.

## 2023-03-08 NOTE — TELEPHONE ENCOUNTER
Patient called today indicating that his BP was 190/110 last night and this morning was 167/96. He is asking if he should increase his spironolactone to 50 mg daily now rather than waiting a week. Please advise.

## 2023-03-25 LAB
LEFT VENTRICULAR EJECTION FRACTION HIGH VALUE: 45 %
LV EF: 40 %

## 2023-03-29 ENCOUNTER — HOSPITAL ENCOUNTER (OUTPATIENT)
Dept: CARDIOLOGY | Age: 78
Discharge: HOME | End: 2023-03-29
Payer: MEDICARE

## 2023-03-29 PROCEDURE — 93306 TTE W/DOPPLER COMPLETE: CPT

## 2023-04-03 ENCOUNTER — HOSPITAL ENCOUNTER (OUTPATIENT)
Dept: OTHER | Age: 78
Setting detail: THERAPIES SERIES
Discharge: HOME | End: 2023-04-03
Payer: MEDICARE

## 2023-04-03 VITALS
WEIGHT: 226 LBS | SYSTOLIC BLOOD PRESSURE: 150 MMHG | OXYGEN SATURATION: 93 % | BODY MASS INDEX: 30.65 KG/M2 | DIASTOLIC BLOOD PRESSURE: 82 MMHG | RESPIRATION RATE: 18 BRPM

## 2023-04-03 LAB
ANION GAP SERPL CALCULATED.3IONS-SCNC: 9 MMOL/L (ref 7–16)
BNP BLD-MCNC: 340 PG/ML (ref 0–450)
BUN SERPL-MCNC: 25 MG/DL (ref 6–23)
CALCIUM SERPL-MCNC: 9.6 MG/DL (ref 8.6–10.2)
CHLORIDE SERPL-SCNC: 101 MMOL/L (ref 98–107)
CO2 SERPL-SCNC: 26 MMOL/L (ref 22–29)
CREAT SERPL-MCNC: 1.3 MG/DL (ref 0.7–1.2)
GLUCOSE SERPL-MCNC: 98 MG/DL (ref 74–99)
POTASSIUM SERPL-SCNC: 4.9 MMOL/L (ref 3.5–5)
SODIUM SERPL-SCNC: 136 MMOL/L (ref 132–146)

## 2023-04-03 PROCEDURE — 80048 BASIC METABOLIC PNL TOTAL CA: CPT

## 2023-04-03 PROCEDURE — 36415 COLL VENOUS BLD VENIPUNCTURE: CPT

## 2023-04-03 PROCEDURE — 83880 ASSAY OF NATRIURETIC PEPTIDE: CPT

## 2023-04-03 PROCEDURE — 99214 OFFICE O/P EST MOD 30 MIN: CPT

## 2023-04-03 NOTE — PROGRESS NOTES
TROPONINI in the last 72 hours. BNP: No results for input(s): BNP in the last 72 hours. Lipids: No results for input(s): CHOL, HDL in the last 72 hours. Invalid input(s): LDLCALCU  INR: No results for input(s): INR in the last 72 hours. WEIGHTS:    Wt Readings from Last 3 Encounters:   04/03/23 226 lb (102.5 kg)   03/06/23 227 lb (103 kg)   02/06/23 229 lb (103.9 kg)     TELEMETRY:  Cardiac Regularity: Regular  Cardiac Rhythm/Interpretation: SR    ASSESSMENT:  Neri Randall weight is down 2 pounds since his last visit with us. Patient states he has been monitoring his BP at home due to having a AAA. Interventions completed this visit:  IV diuretics given- no   Lab work obtained yes, proBNP/BMP   Reviewed currently prescribed medications with patient, educated on importance of compliance and answered any questions regarding their medication  Educated on signs and symptoms of HF  Educated on low sodium diet    PLAN:  Scheduled to follow up in CHF clinic on   Future Appointments   Date Time Provider Kristen Anders   5/23/2023 10:15 AM [facility] ROOM 2 [facility]   6/1/2023 10:45 AM Faith Kehr, MD [address]     Given clinic phone number [phone] and aware of signs and symptoms to call with any HF change in symptoms.

## 2023-05-09 ENCOUNTER — HOSPITAL ENCOUNTER (OUTPATIENT)
Dept: ULTRASOUND IMAGING | Age: 78
Discharge: HOME | End: 2023-05-11
Payer: MEDICARE

## 2023-05-09 ENCOUNTER — HOSPITAL ENCOUNTER (OUTPATIENT)
Dept: OTHER | Age: 78
Setting detail: THERAPIES SERIES
Discharge: HOME | End: 2023-05-09
Payer: MEDICARE

## 2023-05-09 VITALS
OXYGEN SATURATION: 94 % | DIASTOLIC BLOOD PRESSURE: 82 MMHG | BODY MASS INDEX: 30.65 KG/M2 | SYSTOLIC BLOOD PRESSURE: 132 MMHG | WEIGHT: 226 LBS | HEART RATE: 68 BPM | RESPIRATION RATE: 16 BRPM

## 2023-05-09 DIAGNOSIS — I71.40 ABDOMINAL AORTIC ANEURYSM (AAA) WITHOUT RUPTURE, UNSPECIFIED PART (HCC): ICD-10-CM

## 2023-05-09 LAB
ANION GAP SERPL CALCULATED.3IONS-SCNC: 8 MMOL/L (ref 7–16)
BNP BLD-MCNC: 282 PG/ML (ref 0–450)
BUN SERPL-MCNC: 27 MG/DL (ref 6–23)
CALCIUM SERPL-MCNC: 9.1 MG/DL (ref 8.6–10.2)
CHLORIDE SERPL-SCNC: 106 MMOL/L (ref 98–107)
CO2 SERPL-SCNC: 22 MMOL/L (ref 22–29)
CREAT SERPL-MCNC: 1.2 MG/DL (ref 0.7–1.2)
GLUCOSE SERPL-MCNC: 130 MG/DL (ref 74–99)
POTASSIUM SERPL-SCNC: 4.6 MMOL/L (ref 3.5–5)
SODIUM SERPL-SCNC: 136 MMOL/L (ref 132–146)

## 2023-05-09 PROCEDURE — 99214 OFFICE O/P EST MOD 30 MIN: CPT

## 2023-05-09 PROCEDURE — 83880 ASSAY OF NATRIURETIC PEPTIDE: CPT

## 2023-05-09 PROCEDURE — 36415 COLL VENOUS BLD VENIPUNCTURE: CPT

## 2023-05-09 PROCEDURE — 76775 US EXAM ABDO BACK WALL LIM: CPT

## 2023-05-09 PROCEDURE — 80048 BASIC METABOLIC PNL TOTAL CA: CPT

## 2023-05-09 NOTE — PROGRESS NOTES
Congestive Heart Failure [address] [facility]  [address] [facility]   1945    Referring Provider: Adolfo Horn  Primary Care Physician: Marlene Rodriguez  Cardiologist: Lida Castellano  Nephrologist:     History of Present Illness:     Wili Vergara is a [de-identified] y.o. male with a history of HKmbw84-24% on 3-29-23    Patient Story:    He does  have dyspnea with carrying things up steps, no shortness of breath, or decline in overall functional capacity. He does not have orthopnea, PND, nocturnal cough or hemoptysis. He does not have abdominal distention or bloating, early satiety, anorexia/change in appetite. He does have a good urinary response to oral diuretic. He does not have lower extremity edema. He denies lightheadedness, dizziness. He denies palpitations, syncope or near syncope. He does not complain of chest pain, pressure, discomfort. No Known Allergies      Prior to Visit Medications    Medication Sig Taking?  Authorizing Provider   carvedilol (COREG) 25 MG tablet Take 1 tablet by mouth 2 times daily (with meals)  Ajit Ordoñez MD   spironolactone (ALDACTONE) 50 MG tablet Take 1 tablet by mouth daily  Ajit Ordoñez MD   diclofenac sodium (VOLTAREN) 1 % GEL Apply topically daily  Historical Provider, MD   atorvastatin (LIPITOR) 20 MG tablet Take 1 tablet by mouth daily  Madhavi Canchola MD   ENTRESTO  MG per tablet TAKE 1 TABLET TWICE A DAY  Ajit Ordoñez MD   acetaminophen (TYLENOL) 500 MG tablet Take 1 tablet by mouth every 6 hours as needed for Pain  Historical Provider, MD   dapagliflozin (FARXIGA) 10 MG tablet Take 1 tablet by mouth every morning  Vickie Morse APRN - CNP   furosemide (LASIX) 20 MG tablet Take 1 tablet by mouth daily  Ajit Ordoñez MD   apixaban (ELIQUIS) 5 MG TABS tablet Take 1 tablet by mouth 2 times daily  Historical Provider, MD   sertraline (ZOLOFT) 25 MG tablet Take 1 tablet by mouth every morning  Historical
04/03/2023 26   03/06/2023 24     BUN (mg/dL)   Date Value   05/09/2023 27 (H)   04/03/2023 25 (H)   03/06/2023 19     Glucose (mg/dL)   Date Value   05/09/2023 130 (H)   04/03/2023 98   03/06/2023 87     Calcium (mg/dL)   Date Value   05/09/2023 9.1   04/03/2023 9.6   03/06/2023 9.3     Last 3 BNP       Pro-BNP (pg/mL)   Date Value   05/09/2023 282   04/03/2023 340   03/06/2023 314      CBC: No results for input(s): WBC, HGB, PLT in the last 72 hours. BMP:    Recent Labs     05/09/23  1035      K 4.6      CO2 22   BUN 27*   CREATININE 1.2   GLUCOSE 130*                   Hepatic: No results for input(s): AST, ALT, ALB, BILITOT, ALKPHOS in the last 72 hours. Troponin: No results for input(s): TROPONINI in the last 72 hours. BNP: No results for input(s): BNP in the last 72 hours. Lipids: No results for input(s): CHOL, HDL in the last 72 hours. Invalid input(s): LDLCALCU  INR: No results for input(s): INR in the last 72 hours. WEIGHTS:    Wt Readings from Last 3 Encounters:   05/09/23 226 lb (102.5 kg)   04/03/23 226 lb (102.5 kg)   03/06/23 227 lb (103 kg)     TELEMETRY:  Cardiac Regularity: Regular  Cardiac Rhythm/Interpretation: SR    ASSESSMENT:  Neri Randall weight is stable since his lastCHF Clinic appointment. Patient states he has been monitoring his BP at home Patient C/o SOB only while carrying something up steps otherwise active goes to [facility] daily and tolerates well. US of Abdomen/Aorta today Negative.  Patient continues to hydrate with 64fld ounces and weighs daily          Interventions completed this visit:  IV diuretics given- no   Lab work obtained yes, proBNP/BMP   Reviewed currently prescribed medications with patient, educated on importance of compliance and answered any questions regarding their medication  Educated on signs and symptoms of HF  Educated on low sodium diet    PLAN:  Scheduled to follow up in CHF clinic on   Future Appointments   Date Time Provider Department

## 2023-05-16 ENCOUNTER — TELEPHONE (OUTPATIENT)
Dept: CARDIOLOGY CLINIC | Age: 78
End: 2023-05-16

## 2023-05-16 NOTE — TELEPHONE ENCOUNTER
Contacted patient with results and recommendations per Dr. Arcadio Mays.   Patient verbalized understanding and will follow-up with Dr. Digna Cervantes.

## 2023-05-16 NOTE — TELEPHONE ENCOUNTER
----- Message from Radha Rodriguez MD sent at 5/16/2023 11:04 AM EDT -----  Creatinine stable, proBNP normal.    Seems like he was doing well at most recent CHF clinic visit. Continue same, follow-up as scheduled.     As an addendum to the result on the abdominal ultrasound, there was incidental notation of a kidney cyst.  Should follow-up with Dr. Rodriguez Araujo.

## 2023-05-16 NOTE — TELEPHONE ENCOUNTER
----- Message from Juliet Whitten MD sent at 5/16/2023 11:03 AM EDT -----  Ultrasound of the abdomen looks fine, no evidence of an abdominal aortic aneurysm.

## 2023-05-23 ENCOUNTER — HOSPITAL ENCOUNTER (OUTPATIENT)
Dept: OTHER | Age: 78
Discharge: HOME | End: 2023-05-23

## 2023-06-01 ENCOUNTER — OFFICE VISIT (OUTPATIENT)
Dept: CARDIOLOGY CLINIC | Age: 78
End: 2023-06-01
Payer: MEDICARE

## 2023-06-01 VITALS
HEIGHT: 72 IN | WEIGHT: 228 LBS | SYSTOLIC BLOOD PRESSURE: 130 MMHG | HEART RATE: 63 BPM | BODY MASS INDEX: 30.88 KG/M2 | DIASTOLIC BLOOD PRESSURE: 82 MMHG | RESPIRATION RATE: 16 BRPM

## 2023-06-01 DIAGNOSIS — I48.0 PAF (PAROXYSMAL ATRIAL FIBRILLATION) (HCC): ICD-10-CM

## 2023-06-01 DIAGNOSIS — I42.9 CARDIOMYOPATHY, UNSPECIFIED TYPE (HCC): ICD-10-CM

## 2023-06-01 DIAGNOSIS — Z79.01 ON APIXABAN THERAPY: ICD-10-CM

## 2023-06-01 DIAGNOSIS — I44.4 LAFB (LEFT ANTERIOR FASCICULAR BLOCK): ICD-10-CM

## 2023-06-01 DIAGNOSIS — I50.43 ACUTE ON CHRONIC COMBINED SYSTOLIC AND DIASTOLIC CONGESTIVE HEART FAILURE (HCC): ICD-10-CM

## 2023-06-01 DIAGNOSIS — I71.21 ANEURYSM OF ASCENDING AORTA WITHOUT RUPTURE (HCC): Primary | ICD-10-CM

## 2023-06-01 PROCEDURE — 3075F SYST BP GE 130 - 139MM HG: CPT | Performed by: INTERNAL MEDICINE

## 2023-06-01 PROCEDURE — 1123F ACP DISCUSS/DSCN MKR DOCD: CPT | Performed by: INTERNAL MEDICINE

## 2023-06-01 PROCEDURE — 99214 OFFICE O/P EST MOD 30 MIN: CPT | Performed by: INTERNAL MEDICINE

## 2023-06-01 PROCEDURE — 93000 ELECTROCARDIOGRAM COMPLETE: CPT | Performed by: INTERNAL MEDICINE

## 2023-06-01 PROCEDURE — 3079F DIAST BP 80-89 MM HG: CPT | Performed by: INTERNAL MEDICINE

## 2023-06-01 NOTE — PROGRESS NOTES
OUTPATIENT CARDIOLOGY FOLLOW-UP    Name: Seema Baker    Age: [de-identified] y.o. Primary Care Physician: Erwin Ramos MD    Date of Service: 6/1/2023    Chief Complaint:   Chief Complaint   Patient presents with    Atrial Fibrillation        Interim History:   Here for follow-up regarding atrial fibrillation and LV dysfunction. Seen in initial outpatient consultation in 4/1/2022 with new A. fib. Was scheduled for MERT cardioversion outpatient, but on 4/6/2022 presented to the ER and was admitted, treated for CHF and underwent MERT cardioversion. EF was severely reduced at 30%. GDMT has been uptitrated and has been maintaining sinus rhythm. Most recent EF 4/2023 shows improvement to 40-45%. He feels well. He is back in the gym exercising regularly. He went fishing yesterday. Main complaint is of arthritis and knee pain, he would like to have a knee replacement. He denies chest pain, shortness of breath, palpitations, syncope, lower extremity edema. Asking if he can have a glass of wine now and again.     Review of Systems:   Negative except as described above    Past Medical History:  Past Medical History:   Diagnosis Date    Arthritis     Environmental allergies     grass mold trees    Hypertension     Renal cell carcinoma [facility]) July 2014    s/p right nephrectomy       Past Surgical History:  Past Surgical History:   Procedure Laterality Date    COLONOSCOPY      DIAGNOSTIC CARDIAC CATH LAB PROCEDURE      HERNIA REPAIR      JOINT REPLACEMENT      beto hips    KNEE CARTILAGE SURGERY  years ago    right    PILONIDAL CYST EXCISION      TESTICLE REMOVAL  1995    TOTAL NEPHRECTOMY  7/22/14    right radical nephrectomy laparoscopic hand assisted with cystoscopy right ureteral catheter insertion        Family History:  Family History   Problem Relation Age of Onset    Heart Failure Father        Social History:  Social History     Tobacco Use    Smoking status: Former     Types: Cigarettes, Pipe    Smokeless

## 2023-06-04 DIAGNOSIS — Z79.899 NEW MEDICATION ADDED: ICD-10-CM

## 2023-06-04 DIAGNOSIS — I50.20 HFREF (HEART FAILURE WITH REDUCED EJECTION FRACTION) (HCC): ICD-10-CM

## 2023-06-05 RX ORDER — DAPAGLIFLOZIN 10 MG/1
TABLET, FILM COATED ORAL
Qty: 90 TABLET | Refills: 3 | Status: SHIPPED | OUTPATIENT
Start: 2023-06-05

## 2023-06-23 ENCOUNTER — TELEPHONE (OUTPATIENT)
Dept: CARDIOLOGY CLINIC | Age: 78
End: 2023-06-23

## 2023-08-01 ENCOUNTER — HOSPITAL ENCOUNTER (OUTPATIENT)
Dept: OTHER | Age: 78
Discharge: HOME | End: 2023-08-01

## 2023-08-01 ENCOUNTER — HOSPITAL ENCOUNTER (OUTPATIENT)
Dept: OTHER | Age: 78
Setting detail: THERAPIES SERIES
Discharge: HOME | End: 2023-08-01
Payer: MEDICARE

## 2023-08-01 ENCOUNTER — HOSPITAL ENCOUNTER (OUTPATIENT)
Age: 78
Discharge: HOME | End: 2023-08-01
Payer: MEDICARE

## 2023-08-01 VITALS
WEIGHT: 227 LBS | RESPIRATION RATE: 18 BRPM | HEART RATE: 106 BPM | DIASTOLIC BLOOD PRESSURE: 70 MMHG | OXYGEN SATURATION: 94 % | BODY MASS INDEX: 30.79 KG/M2 | SYSTOLIC BLOOD PRESSURE: 132 MMHG

## 2023-08-01 LAB
ALBUMIN SERPL-MCNC: 4.1 G/DL (ref 3.5–5.2)
ALP SERPL-CCNC: 75 U/L (ref 40–129)
ALT SERPL-CCNC: 26 U/L (ref 0–40)
ANION GAP SERPL CALCULATED.3IONS-SCNC: 8 MMOL/L (ref 7–16)
ANION GAP SERPL CALCULATED.3IONS-SCNC: 8 MMOL/L (ref 7–16)
AST SERPL-CCNC: 19 U/L (ref 0–39)
BILIRUB SERPL-MCNC: 0.5 MG/DL (ref 0–1.2)
BNP SERPL-MCNC: 3519 PG/ML (ref 0–450)
BUN SERPL-MCNC: 42 MG/DL (ref 6–23)
BUN SERPL-MCNC: 43 MG/DL (ref 6–23)
CALCIUM SERPL-MCNC: 9.1 MG/DL (ref 8.6–10.2)
CALCIUM SERPL-MCNC: 9.3 MG/DL (ref 8.6–10.2)
CHLORIDE SERPL-SCNC: 105 MMOL/L (ref 98–107)
CHLORIDE SERPL-SCNC: 106 MMOL/L (ref 98–107)
CHOLEST SERPL-MCNC: 123 MG/DL
CO2 SERPL-SCNC: 22 MMOL/L (ref 22–29)
CO2 SERPL-SCNC: 23 MMOL/L (ref 22–29)
CREAT SERPL-MCNC: 1.6 MG/DL (ref 0.7–1.2)
CREAT SERPL-MCNC: 1.7 MG/DL (ref 0.7–1.2)
EKG ATRIAL RATE: 53 BPM
EKG Q-T INTERVAL: 358 MS
EKG QRS DURATION: 112 MS
EKG QTC CALCULATION (BAZETT): 464 MS
EKG R AXIS: -35 DEGREES
EKG T AXIS: 27 DEGREES
EKG VENTRICULAR RATE: 101 BPM
ERYTHROCYTE [DISTWIDTH] IN BLOOD BY AUTOMATED COUNT: 13.8 % (ref 11.5–15)
GFR SERPL CREATININE-BSD FRML MDRD: 41 ML/MIN/1.73M2
GFR SERPL CREATININE-BSD FRML MDRD: 44 ML/MIN/1.73M2
GLUCOSE SERPL-MCNC: 94 MG/DL (ref 74–99)
GLUCOSE SERPL-MCNC: 95 MG/DL (ref 74–99)
HCT VFR BLD AUTO: 46.4 % (ref 37–54)
HDLC SERPL-MCNC: 48 MG/DL
HGB BLD-MCNC: 14.8 G/DL (ref 12.5–16.5)
LDLC SERPL CALC-MCNC: 49 MG/DL
MCH RBC QN AUTO: 31.2 PG (ref 26–35)
MCHC RBC AUTO-ENTMCNC: 31.9 G/DL (ref 32–34.5)
MCV RBC AUTO: 97.7 FL (ref 80–99.9)
PLATELET # BLD AUTO: 253 K/UL (ref 130–450)
PMV BLD AUTO: 9.8 FL (ref 7–12)
POTASSIUM SERPL-SCNC: 5.2 MMOL/L (ref 3.5–5)
POTASSIUM SERPL-SCNC: 5.5 MMOL/L (ref 3.5–5)
PROT SERPL-MCNC: 7.1 G/DL (ref 6.4–8.3)
RBC # BLD AUTO: 4.75 M/UL (ref 3.8–5.8)
SODIUM SERPL-SCNC: 136 MMOL/L (ref 132–146)
SODIUM SERPL-SCNC: 136 MMOL/L (ref 132–146)
TRIGL SERPL-MCNC: 130 MG/DL
URATE SERPL-MCNC: 7.6 MG/DL (ref 3.4–7)
VLDLC SERPL CALC-MCNC: 26 MG/DL
WBC OTHER # BLD: 5.5 K/UL (ref 4.5–11.5)

## 2023-08-01 PROCEDURE — 84550 ASSAY OF BLOOD/URIC ACID: CPT

## 2023-08-01 PROCEDURE — 80061 LIPID PANEL: CPT

## 2023-08-01 PROCEDURE — 6360000002 HC RX W HCPCS

## 2023-08-01 PROCEDURE — 85027 COMPLETE CBC AUTOMATED: CPT

## 2023-08-01 PROCEDURE — 93005 ELECTROCARDIOGRAM TRACING: CPT | Performed by: INTERNAL MEDICINE

## 2023-08-01 PROCEDURE — 80053 COMPREHEN METABOLIC PANEL: CPT

## 2023-08-01 PROCEDURE — 93010 ELECTROCARDIOGRAM REPORT: CPT | Performed by: INTERNAL MEDICINE

## 2023-08-01 PROCEDURE — 36415 COLL VENOUS BLD VENIPUNCTURE: CPT

## 2023-08-01 PROCEDURE — 2580000003 HC RX 258

## 2023-08-01 PROCEDURE — 83880 ASSAY OF NATRIURETIC PEPTIDE: CPT

## 2023-08-01 PROCEDURE — 80048 BASIC METABOLIC PNL TOTAL CA: CPT

## 2023-08-01 PROCEDURE — 96374 THER/PROPH/DIAG INJ IV PUSH: CPT

## 2023-08-01 PROCEDURE — 99214 OFFICE O/P EST MOD 30 MIN: CPT

## 2023-08-01 RX ORDER — SODIUM CHLORIDE 0.9 % (FLUSH) 0.9 %
SYRINGE (ML) INJECTION
Status: COMPLETED
Start: 2023-08-01 | End: 2023-08-01

## 2023-08-01 RX ORDER — SODIUM CHLORIDE 0.9 % (FLUSH) 0.9 %
10 SYRINGE (ML) INJECTION ONCE
Status: COMPLETED | OUTPATIENT
Start: 2023-08-01 | End: 2023-08-01

## 2023-08-01 RX ORDER — FUROSEMIDE 10 MG/ML
40 INJECTION INTRAMUSCULAR; INTRAVENOUS ONCE
Status: COMPLETED | OUTPATIENT
Start: 2023-08-01 | End: 2023-08-01

## 2023-08-01 RX ORDER — FUROSEMIDE 10 MG/ML
INJECTION INTRAMUSCULAR; INTRAVENOUS
Status: COMPLETED
Start: 2023-08-01 | End: 2023-08-01

## 2023-08-01 RX ADMIN — Medication 10 ML: at 10:29

## 2023-08-01 RX ADMIN — FUROSEMIDE 40 MG: 10 INJECTION, SOLUTION INTRAMUSCULAR; INTRAVENOUS at 10:29

## 2023-08-01 RX ADMIN — SODIUM CHLORIDE, PRESERVATIVE FREE 10 ML: 5 INJECTION INTRAVENOUS at 10:29

## 2023-08-01 RX ADMIN — FUROSEMIDE 40 MG: 10 INJECTION INTRAMUSCULAR; INTRAVENOUS at 10:29

## 2023-08-01 ASSESSMENT — PATIENT HEALTH QUESTIONNAIRE - PHQ9
1. LITTLE INTEREST OR PLEASURE IN DOING THINGS: NOT AT ALL
2. FEELING DOWN, DEPRESSED OR HOPELESS: NOT AT ALL
SUM OF ALL RESPONSES TO PHQ9 QUESTIONS 1 & 2: 0

## 2023-08-01 NOTE — PROGRESS NOTES
patient, educated on importance of compliance and answered any questions regarding their medication  Educated on signs and symptoms of HF  Educated on low sodium diet    PLAN:  Scheduled to follow up in CHF clinic on   Future Appointments   Date Time Provider [address] [place] [address]   8/8/2023  7:00 AM [facility] ROOM 3 [facility]   11/20/2023  7:00 AM [facility] ROOM 3 [facility]   12/12/2023  8:30 AM Paige Briceño MD [address] [place] [address]     Given clinic phone number [phone] and aware of signs and symptoms to call with any HF change in symptoms.

## 2023-08-01 NOTE — RESULT ENCOUNTER NOTE
Looks like he is back in atrial fibrillation with abnormal kidney function and elevated proBNP. Would like to be aggressive about keeping him in normal rhythm given his low ejection fraction. Recommend repeat cardioversion with probable addition of amiodarone to promote maintenance of sinus rhythm. If compliant with apixaban and no missed doses in the past 3-4 weeks please arrange cardioversion. If he has missed any doses then can proceed with MERT guided cardioversion versus waiting for minimum 3 weeks of uninterrupted anticoagulation. Stay well-hydrated. Hold spironolactone for 4 days and check labs next week.

## 2023-08-02 ENCOUNTER — NURSE ONLY (OUTPATIENT)
Dept: CARDIOLOGY CLINIC | Age: 78
End: 2023-08-02
Payer: MEDICARE

## 2023-08-02 ENCOUNTER — TELEPHONE (OUTPATIENT)
Dept: CARDIOLOGY CLINIC | Age: 78
End: 2023-08-02

## 2023-08-02 DIAGNOSIS — I48.91 ATRIAL FIBRILLATION WITH RAPID VENTRICULAR RESPONSE (HCC): Primary | ICD-10-CM

## 2023-08-02 PROCEDURE — 93000 ELECTROCARDIOGRAM COMPLETE: CPT | Performed by: INTERNAL MEDICINE

## 2023-08-02 NOTE — RESULT ENCOUNTER NOTE
EKG indeed shows he is back in sinus rhythm. However probably will remain at risk of going in and out of A-fib. Thus may benefit from starting an antiarrhythmic drug to help keep him in normal rhythm. If agreeable can start amiodarone 200 mg 3 times daily for about 17 days and then 200 mg daily. Notify if any new issues.

## 2023-08-03 ENCOUNTER — TELEPHONE (OUTPATIENT)
Dept: CARDIOLOGY CLINIC | Age: 78
End: 2023-08-03

## 2023-08-03 RX ORDER — AMIODARONE HYDROCHLORIDE 200 MG/1
200 TABLET ORAL 3 TIMES DAILY
Qty: 51 TABLET | Refills: 0 | Status: SHIPPED | OUTPATIENT
Start: 2023-08-03 | End: 2023-08-20

## 2023-08-03 RX ORDER — AMIODARONE HYDROCHLORIDE 200 MG/1
200 TABLET ORAL DAILY
Qty: 30 TABLET | Refills: 5 | Status: SHIPPED | OUTPATIENT
Start: 2023-08-03

## 2023-08-03 NOTE — TELEPHONE ENCOUNTER
Contacted patient and his wife with results and recommendations per Dr. Maty Browne. They verbalized understanding and wishes to proceed with amiodarone therapy. Scripts forwarded to Dr. Maty Browne for signature.    ----- Message from Gigi Lanes, MD sent at 8/2/2023  5:01 PM EDT -----  EKG indeed shows he is back in sinus rhythm. However probably will remain at risk of going in and out of A-fib. Thus may benefit from starting an antiarrhythmic drug to help keep him in normal rhythm. If agreeable can start amiodarone 200 mg 3 daysi  es daily for about 17 days and then 200 mg daily. Notify if any new issues.

## 2023-08-10 ENCOUNTER — HOSPITAL ENCOUNTER (OUTPATIENT)
Dept: OTHER | Age: 78
Setting detail: THERAPIES SERIES
Discharge: HOME | End: 2023-08-10
Payer: MEDICARE

## 2023-08-10 VITALS
DIASTOLIC BLOOD PRESSURE: 70 MMHG | RESPIRATION RATE: 18 BRPM | OXYGEN SATURATION: 95 % | HEART RATE: 57 BPM | SYSTOLIC BLOOD PRESSURE: 132 MMHG

## 2023-08-10 LAB
ANION GAP SERPL CALCULATED.3IONS-SCNC: 10 MMOL/L (ref 7–16)
BNP SERPL-MCNC: 208 PG/ML (ref 0–450)
BUN SERPL-MCNC: 27 MG/DL (ref 6–23)
CALCIUM SERPL-MCNC: 9.2 MG/DL (ref 8.6–10.2)
CHLORIDE SERPL-SCNC: 103 MMOL/L (ref 98–107)
CO2 SERPL-SCNC: 21 MMOL/L (ref 22–29)
CREAT SERPL-MCNC: 1.6 MG/DL (ref 0.7–1.2)
GFR SERPL CREATININE-BSD FRML MDRD: 43 ML/MIN/1.73M2
GLUCOSE SERPL-MCNC: 77 MG/DL (ref 74–99)
POTASSIUM SERPL-SCNC: 4.9 MMOL/L (ref 3.5–5)
SODIUM SERPL-SCNC: 134 MMOL/L (ref 132–146)

## 2023-08-10 PROCEDURE — 36415 COLL VENOUS BLD VENIPUNCTURE: CPT

## 2023-08-10 PROCEDURE — 99214 OFFICE O/P EST MOD 30 MIN: CPT

## 2023-08-10 PROCEDURE — 80048 BASIC METABOLIC PNL TOTAL CA: CPT

## 2023-08-10 PROCEDURE — 83880 ASSAY OF NATRIURETIC PEPTIDE: CPT

## 2023-08-10 NOTE — PROGRESS NOTES
08/01/2023 136   08/01/2023 136     Potassium (mmol/L)   Date Value   08/10/2023 4.9   08/01/2023 5.5 (H)   08/01/2023 5.2 (H)     Potassium reflex Magnesium (mmol/L)   Date Value   04/07/2022 3.7   04/06/2022 3.8   03/25/2022 3.6     Chloride (mmol/L)   Date Value   08/10/2023 103   08/01/2023 106   08/01/2023 105     CO2 (mmol/L)   Date Value   08/10/2023 21 (L)   08/01/2023 22   08/01/2023 23     BUN (mg/dL)   Date Value   08/10/2023 27 (H)   08/01/2023 43 (H)   08/01/2023 42 (H)     Glucose (mg/dL)   Date Value   08/10/2023 77   08/01/2023 94   08/01/2023 95     Calcium (mg/dL)   Date Value   08/10/2023 9.2   08/01/2023 9.3   08/01/2023 9.1     Last 3 BNP       Pro-BNP (pg/mL)   Date Value   08/10/2023 208   08/01/2023 3,519 (H)   06/13/2023 239      CBC:   No results for input(s): WBC, HGB, PLT in the last 72 hours. BMP:    Recent Labs     08/10/23  0726      K 4.9      CO2 21*   BUN 27*   CREATININE 1.6*   GLUCOSE 77       Hepatic:   No results for input(s): AST, ALT, ALB, BILITOT, ALKPHOS in the last 72 hours. Troponin: No results for input(s): TROPONINI in the last 72 hours. BNP: No results for input(s): BNP in the last 72 hours. Lipids:   No results for input(s): CHOL, HDL in the last 72 hours. Invalid input(s): LDLCALCU    INR: No results for input(s): INR in the last 72 hours. WEIGHTS:    Wt Readings from Last 3 Encounters:   08/01/23 227 lb (103 kg)   06/13/23 226 lb (102.5 kg)   06/01/23 228 lb (103.4 kg)     TELEMETRY:  Cardiac Regularity: regular  Cardiac Rhythm/Interpretation: NSR    ASSESSMENT:  Sarwat Fields has stable weight and trace edema in BLE. Patient denies chest pain/palpitations. His lungs are clear. He is feeling well. He is taking Amiodarone for 17 days and is on day 3 now. He has some redness of his cheeks and nose since starting the Amiodarone. He is sleeping well.        Interventions completed this visit:  IV diuretics given-No   Lab work obtained yes,

## 2023-08-23 ENCOUNTER — HOSPITAL ENCOUNTER (OUTPATIENT)
Dept: OTHER | Age: 78
Setting detail: THERAPIES SERIES
Discharge: HOME | End: 2023-08-23
Payer: MEDICARE

## 2023-08-23 ENCOUNTER — TELEPHONE (OUTPATIENT)
Dept: CARDIOLOGY CLINIC | Age: 78
End: 2023-08-23

## 2023-08-23 VITALS
RESPIRATION RATE: 16 BRPM | WEIGHT: 225.5 LBS | HEART RATE: 58 BPM | SYSTOLIC BLOOD PRESSURE: 126 MMHG | DIASTOLIC BLOOD PRESSURE: 64 MMHG | BODY MASS INDEX: 30.58 KG/M2 | OXYGEN SATURATION: 94 %

## 2023-08-23 DIAGNOSIS — R06.02 SHORTNESS OF BREATH: Primary | ICD-10-CM

## 2023-08-23 LAB
ANION GAP SERPL CALCULATED.3IONS-SCNC: 10 MMOL/L (ref 7–16)
BNP SERPL-MCNC: 200 PG/ML (ref 0–450)
BUN SERPL-MCNC: 48 MG/DL (ref 6–23)
CALCIUM SERPL-MCNC: 9.1 MG/DL (ref 8.6–10.2)
CHLORIDE SERPL-SCNC: 103 MMOL/L (ref 98–107)
CO2 SERPL-SCNC: 19 MMOL/L (ref 22–29)
CREAT SERPL-MCNC: 2.4 MG/DL (ref 0.7–1.2)
GFR SERPL CREATININE-BSD FRML MDRD: 27 ML/MIN/1.73M2
GLUCOSE SERPL-MCNC: 102 MG/DL (ref 74–99)
POTASSIUM SERPL-SCNC: 5.3 MMOL/L (ref 3.5–5)
SODIUM SERPL-SCNC: 132 MMOL/L (ref 132–146)

## 2023-08-23 PROCEDURE — 99214 OFFICE O/P EST MOD 30 MIN: CPT

## 2023-08-23 PROCEDURE — 80048 BASIC METABOLIC PNL TOTAL CA: CPT

## 2023-08-23 PROCEDURE — 36415 COLL VENOUS BLD VENIPUNCTURE: CPT

## 2023-08-23 PROCEDURE — 83880 ASSAY OF NATRIURETIC PEPTIDE: CPT

## 2023-08-23 NOTE — PROGRESS NOTES
Congestive Heart Failure [address] [place] [address] CHF Clinic  [address] [facility]   1945    Referring Provider: Rose Vasquez  Primary Care Physician: Lenora Penaloza  Cardiologist: Keanu Rice  Nephrologist:     History of Present Illness:     Ghada Walker is a 78 y.o. male with a history of NOctjKW51-56% on 3-29-23    Patient Story:    He does not have dyspnea with exertion or shortness of breath, or decline in overall functional capacity. He does not have orthopnea, PND, nocturnal cough or hemoptysis. He does not have abdominal distention or bloating, early satiety, anorexia/change in appetite. He does have a good urinary response to oral diuretic. He does have trace lower extremity edema. He denies lightheadedness, dizziness. He denies palpitations, syncope or near syncope. He does not complain of chest pain, pressure, discomfort. No Known Allergies    Prior to Visit Medications    Medication Sig Taking?  Authorizing Provider   amiodarone (CORDARONE) 200 MG tablet Take 1 tablet by mouth daily  Laura Antonio MD   FARXIGA 10 MG tablet TAKE 1 TABLET EVERY MORNING(NEW MED)  Laura Antonio MD   spironolactone (ALDACTONE) 50 MG tablet Take 1 tablet by mouth daily  Laura Antonio MD   carvedilol (COREG) 25 MG tablet Take 1 tablet by mouth 2 times daily (with meals)  Laura Antonio MD   atorvastatin (LIPITOR) 20 MG tablet Take 1 tablet by mouth daily  Tiara Landeros MD   ENTRESTO  MG per tablet TAKE 1 TABLET TWICE A DAY  Laura Antonio MD   acetaminophen (TYLENOL) 500 MG tablet Take 1 tablet by mouth every 6 hours as needed for Pain  Historical Provider, MD   furosemide (LASIX) 20 MG tablet Take 1 tablet by mouth daily  Laura Antonio MD   apixaban (ELIQUIS) 5 MG TABS tablet Take 1 tablet by mouth 2 times daily  Historical Provider, MD   sertraline (ZOLOFT) 25 MG tablet Take 1 tablet by mouth every morning  Historical Provider, MD   LORazepam

## 2023-08-23 NOTE — TELEPHONE ENCOUNTER
Contacted patient's wife with recommendations per Dr. Dorinda Davis. She verbalized understanding. Patient will present to [facility] facility for lab work next week.    ----- Message from Adiel Parisi MD sent at 8/23/2023 11:34 AM EDT -----  Potassium elevated and kidney function worse. Stop spironolactone altogether  Recommend holding sacubitril/valsartan for now and repeat BMP in 1 week, call us for results regarding what to do with sacubitril/valsartan; will possibly resume at a lower dose. Stay well hydrated.

## 2023-08-23 NOTE — RESULT ENCOUNTER NOTE
Potassium elevated and kidney function worse. Stop spironolactone altogether  Recommend holding sacubitril/valsartan for now and repeat BMP in 1 week, call us for results regarding what to do with sacubitril/valsartan; will possibly resume at a lower dose. Stay well hydrated.

## 2023-08-25 RX ORDER — CARVEDILOL 25 MG/1
TABLET ORAL
Qty: 60 TABLET | Refills: 5 | Status: SHIPPED | OUTPATIENT
Start: 2023-08-25

## 2023-08-26 DIAGNOSIS — Z79.899 MEDICATION DOSE INCREASED: ICD-10-CM

## 2023-08-26 DIAGNOSIS — I50.20 HFREF (HEART FAILURE WITH REDUCED EJECTION FRACTION) (HCC): ICD-10-CM

## 2023-08-28 ENCOUNTER — TELEPHONE (OUTPATIENT)
Dept: CARDIOLOGY CLINIC | Age: 78
End: 2023-08-28

## 2023-08-28 DIAGNOSIS — I48.91 ATRIAL FIBRILLATION WITH RAPID VENTRICULAR RESPONSE (HCC): Primary | ICD-10-CM

## 2023-08-28 RX ORDER — SACUBITRIL AND VALSARTAN 97; 103 MG/1; MG/1
TABLET, FILM COATED ORAL
Qty: 180 TABLET | Refills: 3 | Status: SHIPPED | OUTPATIENT
Start: 2023-08-28

## 2023-08-28 NOTE — TELEPHONE ENCOUNTER
Patient states his heart rate has been staying in the 50's since starting Amiodarone. States he feels fine. Please advise.

## 2023-08-28 NOTE — TELEPHONE ENCOUNTER
If he is feeling okay we can keep an eye on it. If causing any symptoms such as dizziness, lightheadedness, fatigue, or if he notices heart rates in the 40s while awake please let us know. The amiodarone will help keep him in normal rhythm but will also lower the heart rate. We can always back off on the carvedilol if needed. We need some baseline testing for the amiodarone therapy. Please have him check a TSH with next set of blood work which should be this week for baseline thyroid function. He had a relatively recent liver panel so we do not need to repeat that. He also needs a two-view chest x-ray if has not had one recently, dx amiodarone therapy.

## 2023-08-30 ENCOUNTER — HOSPITAL ENCOUNTER (OUTPATIENT)
Dept: GENERAL RADIOLOGY | Age: 78
Discharge: HOME | End: 2023-09-01
Payer: MEDICARE

## 2023-08-30 ENCOUNTER — HOSPITAL ENCOUNTER (OUTPATIENT)
Age: 78
Discharge: HOME | End: 2023-08-30
Payer: MEDICARE

## 2023-08-30 ENCOUNTER — HOSPITAL ENCOUNTER (OUTPATIENT)
Age: 78
Discharge: HOME | End: 2023-09-01
Payer: MEDICARE

## 2023-08-30 DIAGNOSIS — R06.02 SHORTNESS OF BREATH: ICD-10-CM

## 2023-08-30 DIAGNOSIS — I48.91 ATRIAL FIBRILLATION WITH RAPID VENTRICULAR RESPONSE (HCC): ICD-10-CM

## 2023-08-30 LAB
ANION GAP SERPL CALCULATED.3IONS-SCNC: 9 MMOL/L (ref 7–16)
BNP SERPL-MCNC: 214 PG/ML (ref 0–450)
BUN SERPL-MCNC: 43 MG/DL (ref 6–23)
CALCIUM SERPL-MCNC: 9.2 MG/DL (ref 8.6–10.2)
CHLORIDE SERPL-SCNC: 101 MMOL/L (ref 98–107)
CO2 SERPL-SCNC: 23 MMOL/L (ref 22–29)
CREAT SERPL-MCNC: 2.1 MG/DL (ref 0.7–1.2)
GFR SERPL CREATININE-BSD FRML MDRD: 31 ML/MIN/1.73M2
GLUCOSE SERPL-MCNC: 91 MG/DL (ref 74–99)
POTASSIUM SERPL-SCNC: 4.9 MMOL/L (ref 3.5–5)
SODIUM SERPL-SCNC: 133 MMOL/L (ref 132–146)
TSH SERPL DL<=0.05 MIU/L-ACNC: 2.48 UIU/ML (ref 0.27–4.2)

## 2023-08-30 PROCEDURE — 71046 X-RAY EXAM CHEST 2 VIEWS: CPT

## 2023-08-30 PROCEDURE — 84443 ASSAY THYROID STIM HORMONE: CPT

## 2023-08-30 PROCEDURE — 83880 ASSAY OF NATRIURETIC PEPTIDE: CPT

## 2023-08-30 PROCEDURE — 80048 BASIC METABOLIC PNL TOTAL CA: CPT

## 2023-09-01 ENCOUNTER — TELEPHONE (OUTPATIENT)
Dept: CARDIOLOGY CLINIC | Age: 78
End: 2023-09-01

## 2023-09-01 NOTE — TELEPHONE ENCOUNTER
----- Message from Lissett Lagos MD sent at 9/1/2023 10:02 AM EDT -----  Kidney function abnormal, still quite a bit above baseline. Recommend staying off sacubitril/valsartan and spironolactone for now. Potassium is better. proBNP is normal.    Recommend nephrology referral if he has never seen a nephrologist.    Chest x-ray and thyroid function normal.    Please have him do an EKG at next CHF clinic visit.

## 2023-09-01 NOTE — RESULT ENCOUNTER NOTE
Kidney function abnormal, still quite a bit above baseline. Recommend staying off sacubitril/valsartan and spironolactone for now. Potassium is better. proBNP is normal.    Recommend nephrology referral if he has never seen a nephrologist.    Chest x-ray and thyroid function normal.    Please have him do an EKG at next CHF clinic visit.

## 2023-09-07 ENCOUNTER — TELEPHONE (OUTPATIENT)
Dept: CARDIOLOGY CLINIC | Age: 78
End: 2023-09-07

## 2023-09-07 ENCOUNTER — HOSPITAL ENCOUNTER (OUTPATIENT)
Dept: OTHER | Age: 78
Setting detail: THERAPIES SERIES
Discharge: HOME | End: 2023-09-07
Payer: MEDICARE

## 2023-09-07 VITALS
OXYGEN SATURATION: 94 % | WEIGHT: 228 LBS | DIASTOLIC BLOOD PRESSURE: 62 MMHG | HEART RATE: 60 BPM | RESPIRATION RATE: 16 BRPM | BODY MASS INDEX: 30.92 KG/M2 | SYSTOLIC BLOOD PRESSURE: 120 MMHG

## 2023-09-07 LAB
ANION GAP SERPL CALCULATED.3IONS-SCNC: 9 MMOL/L (ref 7–16)
BNP SERPL-MCNC: 472 PG/ML (ref 0–450)
BUN SERPL-MCNC: 31 MG/DL (ref 6–23)
CALCIUM SERPL-MCNC: 9.2 MG/DL (ref 8.6–10.2)
CHLORIDE SERPL-SCNC: 102 MMOL/L (ref 98–107)
CO2 SERPL-SCNC: 21 MMOL/L (ref 22–29)
CREAT SERPL-MCNC: 1.6 MG/DL (ref 0.7–1.2)
GFR SERPL CREATININE-BSD FRML MDRD: 42 ML/MIN/1.73M2
GLUCOSE SERPL-MCNC: 109 MG/DL (ref 74–99)
POTASSIUM SERPL-SCNC: 4.3 MMOL/L (ref 3.5–5)
SODIUM SERPL-SCNC: 132 MMOL/L (ref 132–146)

## 2023-09-07 PROCEDURE — 36415 COLL VENOUS BLD VENIPUNCTURE: CPT

## 2023-09-07 PROCEDURE — 83880 ASSAY OF NATRIURETIC PEPTIDE: CPT

## 2023-09-07 PROCEDURE — 80048 BASIC METABOLIC PNL TOTAL CA: CPT

## 2023-09-07 PROCEDURE — 99214 OFFICE O/P EST MOD 30 MIN: CPT

## 2023-09-08 LAB
EKG ATRIAL RATE: 57 BPM
EKG P AXIS: 75 DEGREES
EKG P-R INTERVAL: 236 MS
EKG Q-T INTERVAL: 456 MS
EKG QRS DURATION: 128 MS
EKG QTC CALCULATION (BAZETT): 443 MS
EKG R AXIS: -39 DEGREES
EKG T AXIS: -70 DEGREES
EKG VENTRICULAR RATE: 57 BPM

## 2023-09-08 NOTE — RESULT ENCOUNTER NOTE
Remains in normal rhythm with heart rate in the 50s. Kidney function improved creatinine down to 1.6. Potassium normal.    Remain off sacubitril/valsartan and and spironolactone pending nephrology evaluation. Continue amiodarone to help keep him in normal rhythm. Follow-up as scheduled.

## 2023-09-11 ENCOUNTER — TELEPHONE (OUTPATIENT)
Dept: CARDIOLOGY CLINIC | Age: 78
End: 2023-09-11

## 2023-09-11 NOTE — TELEPHONE ENCOUNTER
----- Message from Trish Boothe MD sent at 9/8/2023  4:18 PM EDT -----  Remains in normal rhythm with heart rate in the 50s. Kidney function improved creatinine down to 1.6. Potassium normal.    Remain off sacubitril/valsartan and and spironolactone pending nephrology evaluation. Continue amiodarone to help keep him in normal rhythm. Follow-up as scheduled.

## 2023-09-26 ENCOUNTER — HOSPITAL ENCOUNTER (OUTPATIENT)
Dept: OTHER | Age: 78
Setting detail: THERAPIES SERIES
Discharge: HOME | End: 2023-09-26
Payer: MEDICARE

## 2023-09-26 VITALS
SYSTOLIC BLOOD PRESSURE: 138 MMHG | RESPIRATION RATE: 16 BRPM | WEIGHT: 232 LBS | DIASTOLIC BLOOD PRESSURE: 68 MMHG | BODY MASS INDEX: 31.46 KG/M2 | HEART RATE: 64 BPM | OXYGEN SATURATION: 95 %

## 2023-09-26 LAB
ANION GAP SERPL CALCULATED.3IONS-SCNC: 7 MMOL/L (ref 7–16)
BNP SERPL-MCNC: 574 PG/ML (ref 0–450)
BUN SERPL-MCNC: 21 MG/DL (ref 6–23)
CALCIUM SERPL-MCNC: 9.1 MG/DL (ref 8.6–10.2)
CHLORIDE SERPL-SCNC: 104 MMOL/L (ref 98–107)
CO2 SERPL-SCNC: 26 MMOL/L (ref 22–29)
CREAT SERPL-MCNC: 1.4 MG/DL (ref 0.7–1.2)
GFR SERPL CREATININE-BSD FRML MDRD: 50 ML/MIN/1.73M2
GLUCOSE SERPL-MCNC: 91 MG/DL (ref 74–99)
POTASSIUM SERPL-SCNC: 4 MMOL/L (ref 3.5–5)
SODIUM SERPL-SCNC: 137 MMOL/L (ref 132–146)

## 2023-09-26 PROCEDURE — 99214 OFFICE O/P EST MOD 30 MIN: CPT

## 2023-09-26 PROCEDURE — 80048 BASIC METABOLIC PNL TOTAL CA: CPT

## 2023-09-26 PROCEDURE — 83880 ASSAY OF NATRIURETIC PEPTIDE: CPT

## 2023-09-26 PROCEDURE — 36415 COLL VENOUS BLD VENIPUNCTURE: CPT

## 2023-09-26 RX ORDER — ALLOPURINOL 300 MG/1
300 TABLET ORAL DAILY
COMMUNITY

## 2023-09-28 ENCOUNTER — HOSPITAL ENCOUNTER (OUTPATIENT)
Dept: OTHER | Age: 78
Discharge: HOME | End: 2023-09-28

## 2023-10-06 ENCOUNTER — TELEPHONE (OUTPATIENT)
Dept: CARDIOLOGY CLINIC | Age: 78
End: 2023-10-06

## 2023-10-06 NOTE — TELEPHONE ENCOUNTER
----- Message from Paige Briceño MD sent at 10/5/2023  6:10 PM EDT -----  Kidney function looks better and potassium looks good. proBNP is up slightly. If kidney function remains stable at next CHF clinic appointment will consider resuming low-dose sacubitril/valsartan.   Notify if new issues follow-up as scheduled

## 2023-10-19 ENCOUNTER — HOSPITAL ENCOUNTER (OUTPATIENT)
Dept: OTHER | Age: 78
Setting detail: THERAPIES SERIES
Discharge: HOME | End: 2023-10-19
Payer: MEDICARE

## 2023-10-19 VITALS
DIASTOLIC BLOOD PRESSURE: 98 MMHG | RESPIRATION RATE: 18 BRPM | SYSTOLIC BLOOD PRESSURE: 158 MMHG | HEART RATE: 57 BPM | BODY MASS INDEX: 31.19 KG/M2 | WEIGHT: 230 LBS | OXYGEN SATURATION: 95 %

## 2023-10-19 LAB
ANION GAP SERPL CALCULATED.3IONS-SCNC: 10 MMOL/L (ref 7–16)
BNP SERPL-MCNC: 642 PG/ML (ref 0–450)
BUN SERPL-MCNC: 20 MG/DL (ref 6–23)
CALCIUM SERPL-MCNC: 9.1 MG/DL (ref 8.6–10.2)
CHLORIDE SERPL-SCNC: 104 MMOL/L (ref 98–107)
CO2 SERPL-SCNC: 23 MMOL/L (ref 22–29)
CREAT SERPL-MCNC: 1.4 MG/DL (ref 0.7–1.2)
GFR SERPL CREATININE-BSD FRML MDRD: 53 ML/MIN/1.73M2
GLUCOSE SERPL-MCNC: 83 MG/DL (ref 74–99)
POTASSIUM SERPL-SCNC: 4.1 MMOL/L (ref 3.5–5)
SODIUM SERPL-SCNC: 137 MMOL/L (ref 132–146)

## 2023-10-19 PROCEDURE — 36415 COLL VENOUS BLD VENIPUNCTURE: CPT

## 2023-10-19 PROCEDURE — 80048 BASIC METABOLIC PNL TOTAL CA: CPT

## 2023-10-19 PROCEDURE — 83880 ASSAY OF NATRIURETIC PEPTIDE: CPT

## 2023-10-19 PROCEDURE — 99214 OFFICE O/P EST MOD 30 MIN: CPT

## 2023-10-19 RX ORDER — AMLODIPINE BESYLATE 2.5 MG/1
2.5 TABLET ORAL DAILY
COMMUNITY

## 2023-10-19 NOTE — PROGRESS NOTES
Regular  Respiratory Depth: Normal  L Breath Sounds: Clear  R Breath Sounds: Clear  Breath Sounds  Right Upper Lobe: Clear  Right Middle Lobe: Clear  Right Lower Lobe: Clear  Left Upper Lobe: Clear  Left Lower Lobe: Clear     Cardiac  Cardiac Regularity: Regular  Cardiac Rhythm: Sinus zach  Rhythm Interpretation  Pulse: 57     Gastrointestinal  Abdominal (WDL): Within Defined Limits  Abdomen Inspection: Soft         Peripheral Vascular  Peripheral Vascular (WDL): Within Defined Limits  RLE Edema: None  LLE Edema: None           Genitourinary  Genitourinary (WDL): Within Defined Limits  Psychosocial  Psychosocial (WDL): Within Defined Limits              Pulse: 57               LAB DATA:  BMP:  Sodium (mmol/L)   Date Value   10/19/2023 137   09/26/2023 137   09/07/2023 132     Potassium (mmol/L)   Date Value   10/19/2023 4.1   09/26/2023 4.0   09/07/2023 4.3     Potassium reflex Magnesium (mmol/L)   Date Value   04/07/2022 3.7   04/06/2022 3.8   03/25/2022 3.6     Chloride (mmol/L)   Date Value   10/19/2023 104   09/26/2023 104   09/07/2023 102     CO2 (mmol/L)   Date Value   10/19/2023 23   09/26/2023 26   09/07/2023 21 (L)     BUN (mg/dL)   Date Value   10/19/2023 20   09/26/2023 21   09/07/2023 31 (H)     Creatinine (mg/dL)   Date Value   10/19/2023 1.4 (H)   09/26/2023 1.4 (H)   09/07/2023 1.6 (H)     Glucose (mg/dL)   Date Value   10/19/2023 83   09/26/2023 91   09/07/2023 109 (H)     Calcium (mg/dL)   Date Value   10/19/2023 9.1   09/26/2023 9.1   09/07/2023 9.2     BNP:  Pro-BNP (pg/mL)   Date Value   10/19/2023 642 (H)   09/26/2023 574 (H)   09/07/2023 472 (H)      CBC:  WBC (k/uL)   Date Value   08/01/2023 5.5     Hemoglobin (g/dL)   Date Value   08/01/2023 14.8     Hematocrit (%)   Date Value   08/01/2023 46.4     Platelets (k/uL)   Date Value   08/01/2023 253     Iron Studies:  No results found for: \"FERRITIN\", \"IRON\", \"TIBC\"  Hepatic:  AST (U/L)   Date Value   08/01/2023 19     ALT (U/L)   Date Value

## 2023-10-24 ENCOUNTER — HOSPITAL ENCOUNTER (OUTPATIENT)
Dept: OTHER | Age: 78
Discharge: HOME | End: 2023-10-24

## 2023-10-26 ENCOUNTER — TELEPHONE (OUTPATIENT)
Dept: CARDIOLOGY CLINIC | Age: 78
End: 2023-10-26

## 2023-10-26 NOTE — TELEPHONE ENCOUNTER
----- Message from Vanessa Davis MD sent at 10/25/2023 12:42 PM EDT -----  Kidney function looks fairly stable. If okay with his nephrologist I would recommend resuming low-dose sacubitril/valsartan 24-26 mg twice daily with repeat BMP 1 to 2 weeks later.

## 2023-10-27 NOTE — TELEPHONE ENCOUNTER
Patient is going to call Dr. Eligio Barragan and call back with the office if ok to start Entresto 24 mg - 26 mg BID.

## 2023-11-02 RX ORDER — SACUBITRIL AND VALSARTAN 24; 26 MG/1; MG/1
1 TABLET, FILM COATED ORAL 2 TIMES DAILY
Qty: 30 TABLET | Refills: 3 | Status: SHIPPED | OUTPATIENT
Start: 2023-11-02

## 2023-11-02 RX ORDER — SACUBITRIL AND VALSARTAN 24; 26 MG/1; MG/1
1 TABLET, FILM COATED ORAL 2 TIMES DAILY
COMMUNITY
End: 2023-11-02 | Stop reason: SDUPTHER

## 2023-11-02 NOTE — TELEPHONE ENCOUNTER
Patient states Dr. Eligio Barragan said its ok to start Entresto 24-26 mg BID.   E-scribed script, BMP ordered Patient verbalized understanding

## 2023-11-07 RX ORDER — SACUBITRIL AND VALSARTAN 24; 26 MG/1; MG/1
1 TABLET, FILM COATED ORAL 2 TIMES DAILY
Qty: 180 TABLET | Refills: 3 | Status: SHIPPED | OUTPATIENT
Start: 2023-11-07

## 2023-11-14 ENCOUNTER — HOSPITAL ENCOUNTER (EMERGENCY)
Age: 78
Discharge: HOME | End: 2023-11-14
Payer: MEDICARE

## 2023-11-14 ENCOUNTER — APPOINTMENT (OUTPATIENT)
Dept: CT IMAGING | Age: 78
End: 2023-11-14
Payer: MEDICARE

## 2023-11-14 VITALS
WEIGHT: 230 LBS | TEMPERATURE: 97.1 F | OXYGEN SATURATION: 96 % | HEIGHT: 72 IN | SYSTOLIC BLOOD PRESSURE: 170 MMHG | HEART RATE: 96 BPM | BODY MASS INDEX: 31.15 KG/M2 | DIASTOLIC BLOOD PRESSURE: 92 MMHG | RESPIRATION RATE: 16 BRPM

## 2023-11-14 DIAGNOSIS — M50.30 DDD (DEGENERATIVE DISC DISEASE), CERVICAL: ICD-10-CM

## 2023-11-14 DIAGNOSIS — S01.81XA FACIAL LACERATION, INITIAL ENCOUNTER: Primary | ICD-10-CM

## 2023-11-14 DIAGNOSIS — T14.8XXA HEMATOMA: ICD-10-CM

## 2023-11-14 DIAGNOSIS — R03.0 ELEVATED BLOOD PRESSURE READING: ICD-10-CM
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
## 2023-11-14 PROCEDURE — 70486 CT MAXILLOFACIAL W/O DYE: CPT

## 2023-11-14 PROCEDURE — 90471 IMMUNIZATION ADMIN: CPT | Performed by: PHYSICIAN ASSISTANT

## 2023-11-14 PROCEDURE — 99284 EMERGENCY DEPT VISIT MOD MDM: CPT

## 2023-11-14 PROCEDURE — 90714 TD VACC NO PRESV 7 YRS+ IM: CPT | Performed by: PHYSICIAN ASSISTANT

## 2023-11-14 PROCEDURE — 12052 INTMD RPR FACE/MM 2.6-5.0 CM: CPT

## 2023-11-14 PROCEDURE — 70450 CT HEAD/BRAIN W/O DYE: CPT

## 2023-11-14 PROCEDURE — 6370000000 HC RX 637 (ALT 250 FOR IP): Performed by: PHYSICIAN ASSISTANT

## 2023-11-14 PROCEDURE — 6360000002 HC RX W HCPCS: Performed by: PHYSICIAN ASSISTANT

## 2023-11-14 PROCEDURE — 72125 CT NECK SPINE W/O DYE: CPT

## 2023-11-14 RX ORDER — TETANUS AND DIPHTHERIA TOXOIDS ADSORBED 2; 2 [LF]/.5ML; [LF]/.5ML
0.5 INJECTION INTRAMUSCULAR ONCE
Status: COMPLETED | OUTPATIENT
Start: 2023-11-14 | End: 2023-11-14

## 2023-11-14 RX ORDER — ACETAMINOPHEN 325 MG/1
650 TABLET ORAL ONCE
Status: COMPLETED | OUTPATIENT
Start: 2023-11-14 | End: 2023-11-14

## 2023-11-14 RX ADMIN — ACETAMINOPHEN 650 MG: 325 TABLET ORAL at 17:24

## 2023-11-14 RX ADMIN — TETANUS AND DIPHTHERIA TOXOIDS ADSORBED 0.5 ML: 2; 2 INJECTION INTRAMUSCULAR at 17:25

## 2023-11-14 ASSESSMENT — PAIN DESCRIPTION - DESCRIPTORS: DESCRIPTORS: ACHING

## 2023-11-14 ASSESSMENT — PAIN - FUNCTIONAL ASSESSMENT: PAIN_FUNCTIONAL_ASSESSMENT: NONE - DENIES PAIN

## 2023-11-14 ASSESSMENT — PAIN SCALES - GENERAL: PAINLEVEL_OUTOF10: 2

## 2023-11-14 ASSESSMENT — PAIN DESCRIPTION - LOCATION: LOCATION: HEAD

## 2023-11-14 ASSESSMENT — LIFESTYLE VARIABLES
HOW MANY STANDARD DRINKS CONTAINING ALCOHOL DO YOU HAVE ON A TYPICAL DAY: PATIENT DOES NOT DRINK
HOW OFTEN DO YOU HAVE A DRINK CONTAINING ALCOHOL: NEVER

## 2023-11-14 ASSESSMENT — PAIN DESCRIPTION - ORIENTATION: ORIENTATION: LEFT

## 2023-11-14 NOTE — ED PROVIDER NOTES
Independent [facility] Visit. [facility]  Department of Emergency Medicine   ED  Encounter Note  Admit Date/RoomTime: 2023  4:21 PM  ED Room:     NAME: Noel Francisco  : 1945  MRN: [id number]     Chief Complaint:  Fall (Mechanical fall-tripped and fell up 1 step [place] face first to floor. Pt is on eliquis) and Laceration (Above lt eye )    History of Present Illness        Noel Francisco is a 78 y.o. old male who presents to the emergency department by private vehicle, for traumatic L sided facial lac and pain which occured just prior to arrival around 2:30 PM.  Mechanism of pain/injury: states he missed a step at [employer] and fell, hitting his face, and is associated with laceration above the L eye. Denies any dizziness, confusion, CP, SOB, or other symptoms before or after the fall. Denies pain anywhere else. Loss of consciousness: No.  Previous facial injury: no.  Since onset the symptoms have been remaining constant. His pain is aggraveated by palpation and relieved by nothing. There has been no confusion, diaphoresis, fever, nasal congestion, headache, chest pain, SOB, neck pain, palpitations, vertigo, dizziness, blurred vision, diplopia, loss of vision, slurred speech, focal weakness, focal sensory loss, clumsiness, nausea, vomiting, neck pain, incontinence, extremity injury, or seizure activity. He takes Eliquis. Glascow Coma Scale:  Best Eye Response 4 - Opens eyes on own   Best Verbal Response 5 - Alert and oriented   Best Motor Response 6 - Follows simple motor commands   Total Score 15       ROS   Pertinent positives and negatives are stated within HPI, all other systems reviewed and are negative. Past Medical History:  has a past medical history of Arthritis, Environmental allergies, Hypertension, and Renal cell carcinoma ([address]).     Surgical History:  has a past surgical history that includes Diagnostic Cardiac Cath Lab Procedure; hernia repair; Knee

## 2023-11-17 ENCOUNTER — HOSPITAL ENCOUNTER (OUTPATIENT)
Dept: OTHER | Age: 78
Setting detail: THERAPIES SERIES
Discharge: HOME | End: 2023-11-17
Payer: MEDICARE

## 2023-11-17 VITALS
RESPIRATION RATE: 18 BRPM | BODY MASS INDEX: 31.19 KG/M2 | HEART RATE: 61 BPM | SYSTOLIC BLOOD PRESSURE: 147 MMHG | DIASTOLIC BLOOD PRESSURE: 76 MMHG | WEIGHT: 230 LBS | OXYGEN SATURATION: 95 %

## 2023-11-17 LAB
ANION GAP SERPL CALCULATED.3IONS-SCNC: 8 MMOL/L (ref 7–16)
BNP SERPL-MCNC: 110 PG/ML (ref 0–450)
BUN SERPL-MCNC: 19 MG/DL (ref 6–23)
CALCIUM SERPL-MCNC: 9 MG/DL (ref 8.6–10.2)
CHLORIDE SERPL-SCNC: 104 MMOL/L (ref 98–107)
CO2 SERPL-SCNC: 25 MMOL/L (ref 22–29)
CREAT SERPL-MCNC: 1.4 MG/DL (ref 0.7–1.2)
GFR SERPL CREATININE-BSD FRML MDRD: 49 ML/MIN/1.73M2
GLUCOSE SERPL-MCNC: 111 MG/DL (ref 74–99)
POTASSIUM SERPL-SCNC: 4 MMOL/L (ref 3.5–5)
SODIUM SERPL-SCNC: 137 MMOL/L (ref 132–146)

## 2023-11-17 PROCEDURE — 83880 ASSAY OF NATRIURETIC PEPTIDE: CPT

## 2023-11-17 PROCEDURE — 36415 COLL VENOUS BLD VENIPUNCTURE: CPT

## 2023-11-17 PROCEDURE — 99214 OFFICE O/P EST MOD 30 MIN: CPT

## 2023-11-17 PROCEDURE — 80048 BASIC METABOLIC PNL TOTAL CA: CPT

## 2023-11-17 NOTE — PROGRESS NOTES
Congestive Heart Failure [address]    Referring Provider: Dr. Otoniel Early  Primary Care Physician: Devon Min MD   Cardiologist: Dr. Otoniel Early  Nephrologist: Dr. Navneet Jones:     Heather Bañuelos is a 78 y.o. 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 In the CHF clinic today he denies any adverse symptoms except:  [] Dizziness or lightheadedness   [] Syncope or near syncope  [] Recent Fall  [] Shortness of breath at rest   [] Dyspnea with exertion  [] Decline in functional capacity (unable to perform activities they had previously been able to do)  [] Fatigue   [] Orthopnea  [] PND  [] Nocturnal cough  [] Hemoptysis  [] Chest pain, pressure, or discomfort  [] Palpitations  [] Abdominal distention  [] Abdominal bloating  [] Early satiety  [] Blood in stool   [] Diarrhea  [] Constipation  [] Nausea/Vomiting  [] Decreased urinary response to oral diuretic   [] Scrotal swelling   [] Lower extremity edema  [] Used PRN doses of oral diuretic   [] Weight gain    Wt Readings from Last 3 Encounters:   11/17/23 104.3 kg (230 lb)   11/14/23 104.3 kg (230 lb)   10/19/23 104.3 kg (230 lb)     SOCIAL HISTORY:  [x] Denies tobacco, alcohol or illicit drug abuse  [] Tobacco use:  [] ETOH use:  [] Illicit drug use:      MEDICATIONS:    No Known Allergies  Prior to Visit Medications    Medication Sig Taking?  Authorizing Provider   sacubitril-valsartan (ENTRESTO) 24-26 MG per tablet Take 1 tablet by mouth 2 times daily  Con MD Nikolas   amLODIPine (NORVASC) 2.5 MG tablet Take 1 tablet by mouth daily  ProviderJoyce MD   allopurinol (ZYLOPRIM) 300 MG tablet Take 1 tablet by mouth daily  Joyce Tello MD   carvedilol (COREG) 25 MG tablet TAKE ONE TABLET BY MOUTH TWICE A

## 2023-11-20 ENCOUNTER — HOSPITAL ENCOUNTER (OUTPATIENT)
Dept: OTHER | Age: 78
Setting detail: THERAPIES SERIES
Discharge: HOME | End: 2023-11-20
Payer: MEDICARE

## 2023-11-30 ENCOUNTER — TELEPHONE (OUTPATIENT)
Dept: CARDIOLOGY CLINIC | Age: 78
End: 2023-11-30

## 2023-11-30 NOTE — TELEPHONE ENCOUNTER
----- Message from Albino Simmonds, MD sent at 11/29/2023  9:15 PM EST -----  Labs look good kidney function stable. proBNP significantly improved. Seems to be doing well back on low dose sacubitril/valsartan. Cont same f/u as scheduled.

## 2023-12-12 ENCOUNTER — TELEPHONE (OUTPATIENT)
Dept: CARDIOLOGY CLINIC | Age: 78
End: 2023-12-12

## 2023-12-12 ENCOUNTER — OFFICE VISIT (OUTPATIENT)
Dept: CARDIOLOGY CLINIC | Age: 78
End: 2023-12-12
Payer: MEDICARE

## 2023-12-12 VITALS
RESPIRATION RATE: 16 BRPM | HEIGHT: 73 IN | WEIGHT: 234.9 LBS | BODY MASS INDEX: 31.13 KG/M2 | HEART RATE: 65 BPM | DIASTOLIC BLOOD PRESSURE: 80 MMHG | SYSTOLIC BLOOD PRESSURE: 152 MMHG

## 2023-12-12 DIAGNOSIS — Z79.01 ON APIXABAN THERAPY: ICD-10-CM

## 2023-12-12 DIAGNOSIS — I48.0 PAF (PAROXYSMAL ATRIAL FIBRILLATION) (HCC): Primary | ICD-10-CM

## 2023-12-12 DIAGNOSIS — I42.8 NICM (NONISCHEMIC CARDIOMYOPATHY) (HCC): ICD-10-CM

## 2023-12-12 DIAGNOSIS — I50.42 CHRONIC COMBINED SYSTOLIC AND DIASTOLIC CONGESTIVE HEART FAILURE (HCC): ICD-10-CM

## 2023-12-12 DIAGNOSIS — N18.9 CHRONIC KIDNEY DISEASE, UNSPECIFIED CKD STAGE: ICD-10-CM

## 2023-12-12 DIAGNOSIS — I71.21 ANEURYSM OF ASCENDING AORTA WITHOUT RUPTURE (HCC): ICD-10-CM

## 2023-12-12 DIAGNOSIS — Z79.899 LONG TERM CURRENT USE OF AMIODARONE: ICD-10-CM

## 2023-12-12 DIAGNOSIS — I10 PRIMARY HYPERTENSION: ICD-10-CM

## 2023-12-12 PROCEDURE — 1123F ACP DISCUSS/DSCN MKR DOCD: CPT | Performed by: INTERNAL MEDICINE

## 2023-12-12 PROCEDURE — 3079F DIAST BP 80-89 MM HG: CPT | Performed by: INTERNAL MEDICINE

## 2023-12-12 PROCEDURE — 93000 ELECTROCARDIOGRAM COMPLETE: CPT | Performed by: INTERNAL MEDICINE

## 2023-12-12 PROCEDURE — 99215 OFFICE O/P EST HI 40 MIN: CPT | Performed by: INTERNAL MEDICINE

## 2023-12-12 PROCEDURE — 3077F SYST BP >= 140 MM HG: CPT | Performed by: INTERNAL MEDICINE

## 2023-12-12 RX ORDER — AMIODARONE HYDROCHLORIDE 200 MG/1
200 TABLET ORAL DAILY
Qty: 90 TABLET | Refills: 3 | Status: SHIPPED | OUTPATIENT
Start: 2023-12-12

## 2023-12-12 NOTE — TELEPHONE ENCOUNTER
Patient need CT chest WO contrast for aneurysm of ascending aorta  without rupture. Pending Prior Auth Thank you.     CPT [id number]  Dx H55.59

## 2023-12-12 NOTE — PROGRESS NOTES
OUTPATIENT CARDIOLOGY FOLLOW-UP    Name: Olegario Corral    Age: 78 y.o. Primary Care Physician: Doron Louie MD    Date of Service: 12/12/2023    Chief Complaint:   Chief Complaint   Patient presents with    Atrial Fibrillation     6 Month OV- Patient has no complaints. Interim History:   Here for follow-up regarding atrial fibrillation and LV dysfunction. Seen in initial outpatient consultation in 4/1/2022 with new A. fib. Was scheduled for MERT cardioversion outpatient, but on 4/6/2022 presented to the ER and was admitted, treated for CHF and underwent MERT cardioversion. EF was severely reduced at 30%. GDMT has been uptitrated and has been maintaining sinus rhythm. Most recent EF 4/2023 shows improvement to 40-45%. After last visit in 6/2023, he developed recurrent atrial fibrillation in 8/2023 but then spontaneously converted to sinus. We initiated oral amiodarone. Subsequently had worsening kidney function and elevated potassium so spironolactone was discontinued, and we stopped sacubitril/valsartan eventually resumed at a lower dose which he has been tolerating. Feeling well. Going to the gym daily, using an exercise bike and rowing machine. Gets occasional shortness of breath but denies chest pain, palpitation, lower extremity edema. Blood pressure running somewhat high, now following with Dr. Angela Cole, he is also on doxazosin recently added to help with blood pressure.     Review of Systems:   Negative except as described above    Past Medical History:  Past Medical History:   Diagnosis Date    Arthritis     Environmental allergies     grass mold trees    Hypertension     Renal cell carcinoma [facility]) July 2014    s/p right nephrectomy       Past Surgical History:  Past Surgical History:   Procedure Laterality Date    COLONOSCOPY      DIAGNOSTIC CARDIAC CATH LAB PROCEDURE      HERNIA REPAIR      JOINT REPLACEMENT      beto hips    KNEE CARTILAGE SURGERY  years ago    right    PILONIDAL

## 2023-12-13 NOTE — TELEPHONE ENCOUNTER
Per [facility]) approval CT chest (35287), Marianela Higuera #[id number], valid 12/13/23-1/27/24.

## 2023-12-26 RX ORDER — ATORVASTATIN CALCIUM 20 MG/1
20 TABLET, FILM COATED ORAL DAILY
Qty: 90 TABLET | Refills: 3 | Status: SHIPPED | OUTPATIENT
Start: 2023-12-26

## 2023-12-27 RX ORDER — CARVEDILOL 25 MG/1
25 TABLET ORAL 2 TIMES DAILY WITH MEALS
Qty: 180 TABLET | Refills: 3 | Status: SHIPPED | OUTPATIENT
Start: 2023-12-27

## 2024-01-09 ENCOUNTER — HOSPITAL ENCOUNTER (OUTPATIENT)
Dept: OTHER | Age: 79
Setting detail: THERAPIES SERIES
Discharge: HOME | End: 2024-01-09
Payer: MEDICARE

## 2024-01-09 ENCOUNTER — HOSPITAL ENCOUNTER (OUTPATIENT)
Age: 79
Discharge: HOME | End: 2024-01-09
Payer: MEDICARE

## 2024-01-09 VITALS
RESPIRATION RATE: 18 BRPM | DIASTOLIC BLOOD PRESSURE: 70 MMHG | WEIGHT: 231.4 LBS | OXYGEN SATURATION: 96 % | SYSTOLIC BLOOD PRESSURE: 128 MMHG | BODY MASS INDEX: 30.53 KG/M2 | HEART RATE: 65 BPM

## 2024-01-09 DIAGNOSIS — Z79.899 LONG TERM CURRENT USE OF AMIODARONE: ICD-10-CM

## 2024-01-09 LAB
ALBUMIN SERPL-MCNC: 4.3 G/DL (ref 3.5–5.2)
ALP SERPL-CCNC: 92 U/L (ref 40–129)
ALT SERPL-CCNC: 26 U/L (ref 0–40)
ANION GAP SERPL CALCULATED.3IONS-SCNC: 8 MMOL/L (ref 7–16)
AST SERPL-CCNC: 21 U/L (ref 0–39)
BILIRUB DIRECT SERPL-MCNC: <0.2 MG/DL (ref 0–0.3)
BILIRUB INDIRECT SERPL-MCNC: NORMAL MG/DL (ref 0–1)
BILIRUB SERPL-MCNC: 0.4 MG/DL (ref 0–1.2)
BNP SERPL-MCNC: 157 PG/ML (ref 0–450)
BUN SERPL-MCNC: 24 MG/DL (ref 6–23)
CALCIUM SERPL-MCNC: 9.4 MG/DL (ref 8.6–10.2)
CHLORIDE SERPL-SCNC: 102 MMOL/L (ref 98–107)
CO2 SERPL-SCNC: 27 MMOL/L (ref 22–29)
CREAT SERPL-MCNC: 1.4 MG/DL (ref 0.7–1.2)
GFR SERPL CREATININE-BSD FRML MDRD: 51 ML/MIN/1.73M2
GLUCOSE SERPL-MCNC: 85 MG/DL (ref 74–99)
POTASSIUM SERPL-SCNC: 4.4 MMOL/L (ref 3.5–5)
PROT SERPL-MCNC: 7 G/DL (ref 6.4–8.3)
SODIUM SERPL-SCNC: 137 MMOL/L (ref 132–146)
T4 FREE SERPL-MCNC: 1.3 NG/DL (ref 0.9–1.7)
TSH SERPL DL<=0.05 MIU/L-ACNC: 2.15 UIU/ML (ref 0.27–4.2)

## 2024-01-09 PROCEDURE — 80053 COMPREHEN METABOLIC PANEL: CPT

## 2024-01-09 PROCEDURE — 84439 ASSAY OF FREE THYROXINE: CPT

## 2024-01-09 PROCEDURE — 36415 COLL VENOUS BLD VENIPUNCTURE: CPT

## 2024-01-09 PROCEDURE — 84443 ASSAY THYROID STIM HORMONE: CPT

## 2024-01-09 PROCEDURE — 83880 ASSAY OF NATRIURETIC PEPTIDE: CPT

## 2024-01-09 PROCEDURE — 99214 OFFICE O/P EST MOD 30 MIN: CPT

## 2024-01-09 PROCEDURE — 82248 BILIRUBIN DIRECT: CPT

## 2024-01-09 RX ORDER — DOXAZOSIN 2 MG/1
2 TABLET ORAL NIGHTLY
COMMUNITY

## 2024-01-09 NOTE — PROGRESS NOTES
Congestive Heart Failure Clinic   [facility]    Referring Provider: Dr. Rodriguez  Primary Care Physician: Neri Ford MD   Cardiologist: Dr. Rodriguez  Nephrologist: Dr. Lima    HISTORY OF PRESENT ILLNESS:     Neri Fields is a 78 y.o. 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    In the CHF clinic today he denies any adverse symptoms except:  [] Dizziness or lightheadedness   [] Syncope or near syncope  [] Recent Fall  [] Shortness of breath at rest   [] Dyspnea with exertion  [] Decline in functional capacity (unable to perform activities they had previously been able to do)  [] Fatigue   [] Orthopnea  [] PND  [] Nocturnal cough  [] Hemoptysis  [] Chest pain, pressure, or discomfort  [] Palpitations  [] Abdominal distention  [] Abdominal bloating  [] Early satiety  [] Blood in stool   [] Diarrhea  [] Constipation  [] Nausea/Vomiting  [] Decreased urinary response to oral diuretic   [] Scrotal swelling   [x] Lower extremity edema  [] Used PRN doses of oral diuretic   [x] Weight gain    Wt Readings from Last 3 Encounters:   01/09/24 105 kg (231 lb 6.4 oz)   12/12/23 106.5 kg (234 lb 14.4 oz)   11/17/23 104.3 kg (230 lb)     SOCIAL HISTORY:  [x] Denies tobacco, alcohol or illicit drug abuse  [] Tobacco use:  [] ETOH use:  [] Illicit drug use:      MEDICATIONS:    No Known Allergies  Prior to Visit Medications    Medication Sig Taking? Authorizing Provider   doxazosin (CARDURA) 2 MG tablet Take 1 tablet by mouth nightly Yes Provider, MD Joyce   carvedilol (COREG) 25 MG tablet Take 1 tablet by mouth 2 times daily (with meals) with meals  Flaquito Rodriguez MD   atorvastatin (LIPITOR) 20 MG tablet TAKE ONE TABLET BY MOUTH DAILY  Flaquito Rodriguez MD   amiodarone (CORDARONE) 200 MG tablet Take 1

## 2024-01-26 ENCOUNTER — HOSPITAL ENCOUNTER (OUTPATIENT)
Dept: CT IMAGING | Age: 79
End: 2024-01-26
Attending: INTERNAL MEDICINE
Payer: MEDICARE

## 2024-01-26 DIAGNOSIS — I71.21 ANEURYSM OF ASCENDING AORTA WITHOUT RUPTURE (HCC): ICD-10-CM

## 2024-01-26 PROCEDURE — 71250 CT THORAX DX C-: CPT

## 2024-02-26 ENCOUNTER — HOSPITAL ENCOUNTER (OUTPATIENT)
Dept: OTHER | Age: 79
Setting detail: THERAPIES SERIES
Discharge: HOME | End: 2024-02-26
Payer: MEDICARE

## 2024-02-26 ENCOUNTER — HOSPITAL ENCOUNTER (OUTPATIENT)
Dept: OTHER | Age: 79
Setting detail: THERAPIES SERIES
Discharge: HOME | End: 2024-02-26

## 2024-02-26 VITALS
HEART RATE: 72 BPM | SYSTOLIC BLOOD PRESSURE: 138 MMHG | OXYGEN SATURATION: 97 % | DIASTOLIC BLOOD PRESSURE: 64 MMHG | RESPIRATION RATE: 18 BRPM | WEIGHT: 237 LBS | BODY MASS INDEX: 31.27 KG/M2

## 2024-02-26 LAB
ANION GAP SERPL CALCULATED.3IONS-SCNC: 9 MMOL/L (ref 7–16)
BNP SERPL-MCNC: 176 PG/ML (ref 0–450)
BUN SERPL-MCNC: 26 MG/DL (ref 6–23)
CALCIUM SERPL-MCNC: 9.7 MG/DL (ref 8.6–10.2)
CHLORIDE SERPL-SCNC: 102 MMOL/L (ref 98–107)
CO2 SERPL-SCNC: 25 MMOL/L (ref 22–29)
CREAT SERPL-MCNC: 1.3 MG/DL (ref 0.7–1.2)
GFR SERPL CREATININE-BSD FRML MDRD: 56 ML/MIN/1.73M2
GLUCOSE SERPL-MCNC: 94 MG/DL (ref 74–99)
POTASSIUM SERPL-SCNC: 4.5 MMOL/L (ref 3.5–5)
SODIUM SERPL-SCNC: 136 MMOL/L (ref 132–146)

## 2024-02-26 PROCEDURE — 36415 COLL VENOUS BLD VENIPUNCTURE: CPT

## 2024-02-26 PROCEDURE — 80048 BASIC METABOLIC PNL TOTAL CA: CPT

## 2024-02-26 PROCEDURE — 99214 OFFICE O/P EST MOD 30 MIN: CPT

## 2024-02-26 PROCEDURE — 83880 ASSAY OF NATRIURETIC PEPTIDE: CPT

## 2024-02-26 ASSESSMENT — PATIENT HEALTH QUESTIONNAIRE - PHQ9
1. LITTLE INTEREST OR PLEASURE IN DOING THINGS: NOT AT ALL
SUM OF ALL RESPONSES TO PHQ9 QUESTIONS 1 & 2: 0
2. FEELING DOWN, DEPRESSED OR HOPELESS: NOT AT ALL

## 2024-02-26 NOTE — PROGRESS NOTES
01/09/2024 24 (H)   11/17/2023 19     Creatinine (mg/dL)   Date Value   02/26/2024 1.3 (H)   01/09/2024 1.4 (H)   11/17/2023 1.4 (H)     Glucose (mg/dL)   Date Value   02/26/2024 94   01/09/2024 85   11/17/2023 111 (H)     Calcium (mg/dL)   Date Value   02/26/2024 9.7   01/09/2024 9.4   11/17/2023 9.0     BNP:  Pro-BNP (pg/mL)   Date Value   02/26/2024 176   01/09/2024 157   11/17/2023 110      CBC:  WBC (k/uL)   Date Value   08/01/2023 5.5     Hemoglobin (g/dL)   Date Value   08/01/2023 14.8     Hematocrit (%)   Date Value   08/01/2023 46.4     Platelets (k/uL)   Date Value   08/01/2023 253     Iron Studies:  No results found for: \"FERRITIN\", \"IRON\", \"TIBC\"  Hepatic:  AST (U/L)   Date Value   01/09/2024 21     ALT (U/L)   Date Value   01/09/2024 26     Total Bilirubin (mg/dL)   Date Value   01/09/2024 0.4     Alkaline Phosphatase (U/L)   Date Value   01/09/2024 92     INR:  INR (no units)   Date Value   03/25/2022 1.0       Wt Readings from Last 3 Encounters:   02/26/24 107.5 kg (237 lb)   01/09/24 105 kg (231 lb 6.4 oz)   12/12/23 106.5 kg (234 lb 14.4 oz)       ASSESSMENT/PLAN:    [x] Euvolemic          [] Hypervolemic, with increase from baseline:  [] Shortness of breath/CAREY  [] JVD  [] HJR  [] Abnormal lung assessment:   [] Orthopnea  [] PND  [] Decreased urinary response to oral diuretic   [] Scrotal swelling   [] Lower extremity edema  [] Compression stockings provided  [] Decline in functional capacity (unable to perform activities they had previously been able to do)  [] Weight gain     [] IV diuretics given   [] Provider notified of recurrent IV diuretic use    Additional Notes:    [x]Lab work obtained    [x] Patient/Family Educated On:  [] HF zones (Green, Yellow, Red) and aware of when to take action   [x] Daily weights  [] Scale provided   [x] Low sodium diet (2000 mg)  Barriers to compliance  [] Refuses to monitor diet  [] Socioeconomic difficulties  [] Unable to cook for self (use of frozen meals,

## 2024-03-18 ENCOUNTER — HOSPITAL ENCOUNTER (OUTPATIENT)
Age: 79
Discharge: HOME | End: 2024-03-18
Payer: MEDICARE

## 2024-03-18 LAB
ALBUMIN SERPL-MCNC: 4.3 G/DL (ref 3.5–5.2)
ALP SERPL-CCNC: 86 U/L (ref 40–129)
ALT SERPL-CCNC: 22 U/L (ref 0–40)
ANION GAP SERPL CALCULATED.3IONS-SCNC: 6 MMOL/L (ref 7–16)
AST SERPL-CCNC: 22 U/L (ref 0–39)
BASOPHILS # BLD: 0.04 K/UL (ref 0–0.2)
BASOPHILS NFR BLD: 1 % (ref 0–2)
BILIRUB SERPL-MCNC: 0.5 MG/DL (ref 0–1.2)
BUN SERPL-MCNC: 24 MG/DL (ref 6–23)
CALCIUM SERPL-MCNC: 9.3 MG/DL (ref 8.6–10.2)
CHLORIDE SERPL-SCNC: 102 MMOL/L (ref 98–107)
CO2 SERPL-SCNC: 26 MMOL/L (ref 22–29)
CREAT SERPL-MCNC: 1.2 MG/DL (ref 0.7–1.2)
CREAT UR-MCNC: 90.9 MG/DL (ref 40–278)
EOSINOPHIL # BLD: 0.13 K/UL (ref 0.05–0.5)
EOSINOPHILS RELATIVE PERCENT: 3 % (ref 0–6)
ERYTHROCYTE [DISTWIDTH] IN BLOOD BY AUTOMATED COUNT: 14.8 % (ref 11.5–15)
GFR SERPL CREATININE-BSD FRML MDRD: 60 ML/MIN/1.73M2
GLUCOSE SERPL-MCNC: 87 MG/DL (ref 74–99)
HCT VFR BLD AUTO: 44.3 % (ref 37–54)
HGB BLD-MCNC: 13.8 G/DL (ref 12.5–16.5)
IMM GRANULOCYTES # BLD AUTO: <0.03 K/UL (ref 0–0.58)
IMM GRANULOCYTES NFR BLD: 0 % (ref 0–5)
LYMPHOCYTES NFR BLD: 1.13 K/UL (ref 1.5–4)
LYMPHOCYTES RELATIVE PERCENT: 29 % (ref 20–42)
MAGNESIUM SERPL-MCNC: 2.2 MG/DL (ref 1.6–2.6)
MCH RBC QN AUTO: 30.7 PG (ref 26–35)
MCHC RBC AUTO-ENTMCNC: 31.2 G/DL (ref 32–34.5)
MCV RBC AUTO: 98.4 FL (ref 80–99.9)
MONOCYTES NFR BLD: 0.38 K/UL (ref 0.1–0.95)
MONOCYTES NFR BLD: 10 % (ref 2–12)
NEUTROPHILS NFR BLD: 57 % (ref 43–80)
NEUTS SEG NFR BLD: 2.26 K/UL (ref 1.8–7.3)
PHOSPHATE SERPL-MCNC: 3.1 MG/DL (ref 2.5–4.5)
PLATELET # BLD AUTO: 206 K/UL (ref 130–450)
PMV BLD AUTO: 9.5 FL (ref 7–12)
POTASSIUM SERPL-SCNC: 4.2 MMOL/L (ref 3.5–5)
PROT SERPL-MCNC: 7 G/DL (ref 6.4–8.3)
RBC # BLD AUTO: 4.5 M/UL (ref 3.8–5.8)
SODIUM SERPL-SCNC: 134 MMOL/L (ref 132–146)
TOTAL PROTEIN, URINE: 12 MG/DL (ref 0–12)
URATE SERPL-MCNC: 3.7 MG/DL (ref 3.4–7)
URINE TOTAL PROTEIN CREATININE RATIO: 0.13 (ref 0–0.2)
WBC OTHER # BLD: 4 K/UL (ref 4.5–11.5)

## 2024-03-18 PROCEDURE — 83735 ASSAY OF MAGNESIUM: CPT

## 2024-03-18 PROCEDURE — 80053 COMPREHEN METABOLIC PANEL: CPT

## 2024-03-18 PROCEDURE — 84156 ASSAY OF PROTEIN URINE: CPT

## 2024-03-18 PROCEDURE — 82570 ASSAY OF URINE CREATININE: CPT

## 2024-03-18 PROCEDURE — 85025 COMPLETE CBC W/AUTO DIFF WBC: CPT

## 2024-03-18 PROCEDURE — 84550 ASSAY OF BLOOD/URIC ACID: CPT

## 2024-03-18 PROCEDURE — 84100 ASSAY OF PHOSPHORUS: CPT

## 2024-03-18 PROCEDURE — 36415 COLL VENOUS BLD VENIPUNCTURE: CPT

## 2024-04-02 ENCOUNTER — TELEPHONE (OUTPATIENT)
Dept: CARDIOLOGY CLINIC | Age: 79
End: 2024-04-02

## 2024-04-02 NOTE — TELEPHONE ENCOUNTER
----- Message from Flaquito Rodriguez MD sent at 3/30/2024  9:10 PM EDT -----  CT chest showed stable enlargement of the aorta at 4.5 cm. Can repeat in 1 year. Thyroid and liver labs were normal. F/u as scheduled.

## 2024-04-22 RX ORDER — ATORVASTATIN CALCIUM 20 MG/1
20 TABLET, FILM COATED ORAL DAILY
Qty: 90 TABLET | Refills: 3 | Status: SHIPPED | OUTPATIENT
Start: 2024-04-22

## 2024-05-08 ENCOUNTER — HOSPITAL ENCOUNTER (OUTPATIENT)
Dept: OTHER | Age: 79
Setting detail: THERAPIES SERIES
Discharge: HOME | End: 2024-05-08
Payer: MEDICARE

## 2024-05-08 VITALS
WEIGHT: 233.6 LBS | BODY MASS INDEX: 30.82 KG/M2 | SYSTOLIC BLOOD PRESSURE: 138 MMHG | DIASTOLIC BLOOD PRESSURE: 76 MMHG | HEART RATE: 56 BPM | OXYGEN SATURATION: 97 % | RESPIRATION RATE: 18 BRPM

## 2024-05-08 LAB
ANION GAP SERPL CALCULATED.3IONS-SCNC: 9 MMOL/L (ref 7–16)
BNP SERPL-MCNC: 195 PG/ML (ref 0–450)
BUN SERPL-MCNC: 22 MG/DL (ref 6–23)
CALCIUM SERPL-MCNC: 9.1 MG/DL (ref 8.6–10.2)
CHLORIDE SERPL-SCNC: 105 MMOL/L (ref 98–107)
CO2 SERPL-SCNC: 24 MMOL/L (ref 22–29)
CREAT SERPL-MCNC: 1.3 MG/DL (ref 0.7–1.2)
GFR, ESTIMATED: 56 ML/MIN/1.73M2
GLUCOSE SERPL-MCNC: 94 MG/DL (ref 74–99)
POTASSIUM SERPL-SCNC: 4.3 MMOL/L (ref 3.5–5)
SODIUM SERPL-SCNC: 138 MMOL/L (ref 132–146)

## 2024-05-08 PROCEDURE — 99214 OFFICE O/P EST MOD 30 MIN: CPT

## 2024-05-08 PROCEDURE — 80048 BASIC METABOLIC PNL TOTAL CA: CPT

## 2024-05-08 PROCEDURE — 83880 ASSAY OF NATRIURETIC PEPTIDE: CPT

## 2024-05-08 PROCEDURE — 36415 COLL VENOUS BLD VENIPUNCTURE: CPT

## 2024-05-08 ASSESSMENT — PATIENT HEALTH QUESTIONNAIRE - PHQ9
SUM OF ALL RESPONSES TO PHQ QUESTIONS 1-9: 0
2. FEELING DOWN, DEPRESSED OR HOPELESS: NOT AT ALL
1. LITTLE INTEREST OR PLEASURE IN DOING THINGS: NOT AT ALL
SUM OF ALL RESPONSES TO PHQ QUESTIONS 1-9: 0
SUM OF ALL RESPONSES TO PHQ QUESTIONS 1-9: 0
SUM OF ALL RESPONSES TO PHQ9 QUESTIONS 1 & 2: 0
SUM OF ALL RESPONSES TO PHQ QUESTIONS 1-9: 0

## 2024-05-08 NOTE — PROGRESS NOTES
Congestive Heart Failure Clinic   [facility]    Referring Provider: Dr. Rodriguez  Primary Care Physician: Neri Ford MD   Cardiologist: Dr. Rodriguez  Nephrologist: Dr. Lima    HISTORY OF PRESENT ILLNESS:     Neri Fields is a 79 y.o. 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    In the CHF clinic today he denies any adverse symptoms except:  [] Dizziness or lightheadedness   [] Syncope or near syncope  [] Recent Fall  [] Shortness of breath at rest   [] Dyspnea with exertion  [] Decline in functional capacity (unable to perform activities they had previously been able to do)  [] Fatigue   [] Orthopnea  [] PND  [] Nocturnal cough  [] Hemoptysis  [] Chest pain, pressure, or discomfort  [] Palpitations  [] Abdominal distention  [] Abdominal bloating  [] Early satiety  [] Blood in stool   [] Diarrhea  [] Constipation  [] Nausea/Vomiting  [] Decreased urinary response to oral diuretic   [] Scrotal swelling   [] Lower extremity edema  [] Used PRN doses of oral diuretic   [] Weight gain    Wt Readings from Last 3 Encounters:   05/08/24 106 kg (233 lb 9.6 oz)   02/26/24 107.5 kg (237 lb)   01/09/24 105 kg (231 lb 6.4 oz)     SOCIAL HISTORY:  [x] Denies tobacco, alcohol or illicit drug abuse  [] Tobacco use:  [] ETOH use:  [] Illicit drug use:      MEDICATIONS:    No Known Allergies  Prior to Visit Medications    Medication Sig Taking? Authorizing Provider   atorvastatin (LIPITOR) 20 MG tablet Take 1 tablet by mouth daily  Flaquito Rodriguez MD   Multiple Vitamin (MULTIVITAMIN ADULT PO) Take 1 tablet by mouth daily  Joyce Tello MD   Misc Natural Products (BLACK CHERRY CONCENTRATE PO) Take 2 capsules by mouth daily  Joyce Tello MD   doxazosin (CARDURA) 2 MG tablet Take 1 tablet by mouth

## 2024-05-14 ENCOUNTER — TELEPHONE (OUTPATIENT)
Dept: CARDIOLOGY CLINIC | Age: 79
End: 2024-05-14

## 2024-05-14 NOTE — TELEPHONE ENCOUNTER
----- Message from Flaquito Rodriguez MD sent at 5/11/2024  6:54 PM EDT -----  Labs look good cont same f/u as scheduled

## 2024-05-14 NOTE — TELEPHONE ENCOUNTER
Patient notified of lab  results and will continue same medications will follow up as scheduled per Dr. Rodriguez's recommendations.

## 2024-05-28 NOTE — TELEPHONE ENCOUNTER
----- Message from JOSE May CNP sent at 6/3/2022 12:24 PM EDT -----  Labs and CHF clinic note reviewed  Vitals at CHF clinic: BP (!) 158/86   Pulse 62     Current GDMT:  Entresto 97/103 mg BID  Toprol 50 mg BID  Spironolactone 25 mg daily   Lasix 40 mg daily    He is back in town, would like to start SGLT2i (Jardiance vs Farxiga 10 mg, whichever is covered by insurance)  Consider changing toprol to coreg if increased BP control needed    Follow up labs one week after starting SGLT2i    Thank you Statement Selected

## 2024-06-04 ENCOUNTER — OFFICE VISIT (OUTPATIENT)
Dept: CARDIOLOGY CLINIC | Age: 79
End: 2024-06-04
Payer: MEDICARE

## 2024-06-04 VITALS
SYSTOLIC BLOOD PRESSURE: 120 MMHG | BODY MASS INDEX: 30.96 KG/M2 | HEART RATE: 60 BPM | WEIGHT: 233.6 LBS | HEIGHT: 73 IN | RESPIRATION RATE: 18 BRPM | DIASTOLIC BLOOD PRESSURE: 60 MMHG

## 2024-06-04 DIAGNOSIS — I71.21 ANEURYSM OF ASCENDING AORTA WITHOUT RUPTURE (HCC): ICD-10-CM

## 2024-06-04 DIAGNOSIS — Z79.01 ON APIXABAN THERAPY: ICD-10-CM

## 2024-06-04 DIAGNOSIS — I35.1 NONRHEUMATIC AORTIC VALVE INSUFFICIENCY: ICD-10-CM

## 2024-06-04 DIAGNOSIS — N18.9 CHRONIC KIDNEY DISEASE, UNSPECIFIED CKD STAGE: ICD-10-CM

## 2024-06-04 DIAGNOSIS — I10 PRIMARY HYPERTENSION: Primary | ICD-10-CM

## 2024-06-04 DIAGNOSIS — I48.0 PAF (PAROXYSMAL ATRIAL FIBRILLATION) (HCC): ICD-10-CM

## 2024-06-04 DIAGNOSIS — I50.42 CHRONIC COMBINED SYSTOLIC AND DIASTOLIC CONGESTIVE HEART FAILURE (HCC): ICD-10-CM

## 2024-06-04 DIAGNOSIS — Z79.899 LONG TERM CURRENT USE OF AMIODARONE: ICD-10-CM

## 2024-06-04 PROCEDURE — 99214 OFFICE O/P EST MOD 30 MIN: CPT | Performed by: INTERNAL MEDICINE

## 2024-06-04 PROCEDURE — 93000 ELECTROCARDIOGRAM COMPLETE: CPT | Performed by: INTERNAL MEDICINE

## 2024-06-04 PROCEDURE — 3078F DIAST BP <80 MM HG: CPT | Performed by: INTERNAL MEDICINE

## 2024-06-04 PROCEDURE — 1123F ACP DISCUSS/DSCN MKR DOCD: CPT | Performed by: INTERNAL MEDICINE

## 2024-06-04 PROCEDURE — 3074F SYST BP LT 130 MM HG: CPT | Performed by: INTERNAL MEDICINE

## 2024-06-04 NOTE — PROGRESS NOTES
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  Stable appearance from 02/06/2023 measurements     No acute pulmonary process  Orders Placed This Encounter   Procedures    TSH    Comprehensive Metabolic Panel    T4, Free    EKG 12 lead        Requested Prescriptions      No prescriptions requested or ordered in this encounter        ASSESSMENT / PLAN:  Paroxysmal atrial fibrillation.  Noted 3/2022.  Maintaining sinus, on apixaban  MERT/DCC 4/6/2022  Self-limited recurrence 8/2023 with spontaneous conversion; amiodarone initiated  Chronic heart failure with reduced ejection fraction. Euvolemic.  proBNP 195 recent CHF clinic 5/8/24, previously 2000s when decompensated.  Presumed nonischemic cardiomyopathy, EF 30% -> 40-45%.  Unclear etiology ischemic versus arrhythmia versus other  Mild aortic regurgitation, mild mitral regurgitation  Left anterior fascicular block  Mild nonobstructive CAD by remote heart cath.  SPECT MPS no ischemia 7/2022  Dilated ascending aorta, 4.5 cm by CT 8/2022, 2/6/2023, 1/2024 has been stable  CKD recent creatinine 1.4-1.6 follows with nephrology.  Recent 1.3  Hypertension, well-controlled  History of renal cell cancer status post right nephrectomy  POLINA, on CPAP    Recommendations:  Doing very well from cardiac standpoint, maintaining sinus rhythm and appears euvolemic, exercising regularly.  EF remains mildly depressed but clinically doing well.      Continue GDMT  Carvedilol 25 mg twice daily  Sacubitril/valsartan 24-26 mg twice daily, reduced from high-dose due to abnormal kidney function/hyperkalemia  Spironolactone discontinued due to hyperkalemia  Dapagliflozin 10 mg daily  Continue furosemide 20 mg daily  Continue statin  Repeat echo to monitor EF on GDMT and with maintaining sinus  TAA has shown no progression over the past couple years remains at 4.5 cm, consider repeat next year and if stable would abandon further surveillance given age  Continue amiodarone for

## 2024-06-20 DIAGNOSIS — I50.42 CHRONIC COMBINED SYSTOLIC AND DIASTOLIC CONGESTIVE HEART FAILURE (HCC): ICD-10-CM

## 2024-06-20 DIAGNOSIS — I42.8 NICM (NONISCHEMIC CARDIOMYOPATHY) (HCC): ICD-10-CM

## 2024-06-20 RX ORDER — AMIODARONE HYDROCHLORIDE 200 MG/1
200 TABLET ORAL DAILY
Qty: 90 TABLET | Refills: 3 | Status: SHIPPED | OUTPATIENT
Start: 2024-06-20

## 2024-06-20 RX ORDER — DAPAGLIFLOZIN 10 MG/1
10 TABLET, FILM COATED ORAL EVERY MORNING
Qty: 90 TABLET | Refills: 3 | Status: SHIPPED | OUTPATIENT
Start: 2024-06-20

## 2024-06-20 RX ORDER — CARVEDILOL 25 MG/1
25 TABLET ORAL 2 TIMES DAILY WITH MEALS
Qty: 180 TABLET | Refills: 3 | Status: SHIPPED | OUTPATIENT
Start: 2024-06-20

## 2024-08-13 ENCOUNTER — HOSPITAL ENCOUNTER (OUTPATIENT)
Dept: CARDIOLOGY | Age: 79
Discharge: HOME | End: 2024-08-15
Attending: INTERNAL MEDICINE
Payer: MEDICARE

## 2024-08-13 VITALS
HEIGHT: 73 IN | SYSTOLIC BLOOD PRESSURE: 120 MMHG | DIASTOLIC BLOOD PRESSURE: 60 MMHG | BODY MASS INDEX: 30.88 KG/M2 | WEIGHT: 233 LBS

## 2024-08-13 DIAGNOSIS — I50.42 CHRONIC COMBINED SYSTOLIC AND DIASTOLIC CONGESTIVE HEART FAILURE (HCC): ICD-10-CM

## 2024-08-13 LAB
LEFT VENTRICULAR EJECTION FRACTION HIGH VALUE: 45 %
LV EF: 40 %

## 2024-08-13 PROCEDURE — 93306 TTE W/DOPPLER COMPLETE: CPT

## 2024-08-14 LAB
ECHO AO ASC DIAM: 4.2 CM
ECHO AO ASCENDING AORTA INDEX: 1.83 CM/M2
ECHO AR MAX VEL PISA: 5 M/S
ECHO AV AREA PEAK VELOCITY: 2.1 CM2
ECHO AV AREA VTI: 2 CM2
ECHO AV AREA/BSA PEAK VELOCITY: 0.9 CM2/M2
ECHO AV AREA/BSA VTI: 0.9 CM2/M2
ECHO AV CUSP MM: 2.3 CM
ECHO AV MEAN GRADIENT: 5 MMHG
ECHO AV MEAN VELOCITY: 1.1 M/S
ECHO AV PEAK GRADIENT: 8 MMHG
ECHO AV PEAK VELOCITY: 1.5 M/S
ECHO AV REGURGITANT PHT: 719.7 MILLISECOND
ECHO AV VELOCITY RATIO: 0.47
ECHO AV VTI: 32.6 CM
ECHO BSA: 2.33 M2
ECHO EST RA PRESSURE: 3 MMHG
ECHO LA DIAMETER INDEX: 1.91 CM/M2
ECHO LA DIAMETER: 4.4 CM
ECHO LA VOL A-L A2C: 86 ML (ref 18–58)
ECHO LA VOL A-L A4C: 67 ML (ref 18–58)
ECHO LA VOL MOD A2C: 82 ML (ref 18–58)
ECHO LA VOL MOD A4C: 65 ML (ref 18–58)
ECHO LA VOLUME AREA LENGTH: 77 ML
ECHO LA VOLUME INDEX A-L A2C: 37 ML/M2 (ref 16–34)
ECHO LA VOLUME INDEX A-L A4C: 29 ML/M2 (ref 16–34)
ECHO LA VOLUME INDEX AREA LENGTH: 33 ML/M2 (ref 16–34)
ECHO LA VOLUME INDEX MOD A2C: 36 ML/M2 (ref 16–34)
ECHO LA VOLUME INDEX MOD A4C: 28 ML/M2 (ref 16–34)
ECHO LV EJECTION FRACTION A2C: 47 %
ECHO LV EJECTION FRACTION A4C: 44 %
ECHO LV FRACTIONAL SHORTENING: 21 % (ref 28–44)
ECHO LV INTERNAL DIMENSION DIASTOLE INDEX: 2.7 CM/M2
ECHO LV INTERNAL DIMENSION DIASTOLIC: 6.2 CM (ref 4.2–5.9)
ECHO LV INTERNAL DIMENSION SYSTOLIC INDEX: 2.13 CM/M2
ECHO LV INTERNAL DIMENSION SYSTOLIC: 4.9 CM
ECHO LV IVSD: 1.5 CM (ref 0.6–1)
ECHO LV IVSS: 1.7 CM
ECHO LV MASS 2D: 429.3 G (ref 88–224)
ECHO LV MASS INDEX 2D: 186.6 G/M2 (ref 49–115)
ECHO LV POSTERIOR WALL DIASTOLIC: 1.4 CM (ref 0.6–1)
ECHO LV POSTERIOR WALL SYSTOLIC: 1.7 CM
ECHO LV RELATIVE WALL THICKNESS RATIO: 0.45
ECHO LVOT AREA: 4.2 CM2
ECHO LVOT AV VTI INDEX: 0.49
ECHO LVOT DIAM: 2.3 CM
ECHO LVOT MEAN GRADIENT: 1 MMHG
ECHO LVOT PEAK GRADIENT: 2 MMHG
ECHO LVOT PEAK VELOCITY: 0.7 M/S
ECHO LVOT STROKE VOLUME INDEX: 28.7 ML/M2
ECHO LVOT SV: 66 ML
ECHO LVOT VTI: 15.9 CM
ECHO MV "A" WAVE DURATION: 138.4 MSEC
ECHO MV A VELOCITY: 0.86 M/S
ECHO MV AREA PHT: 2.1 CM2
ECHO MV AREA VTI: 2.6 CM2
ECHO MV E DECELERATION TIME (DT): 271.8 MS
ECHO MV E VELOCITY: 0.43 M/S
ECHO MV E/A RATIO: 0.5
ECHO MV LVOT VTI INDEX: 1.58
ECHO MV MAX VELOCITY: 1 M/S
ECHO MV MEAN GRADIENT: 1 MMHG
ECHO MV MEAN VELOCITY: 0.4 M/S
ECHO MV PEAK GRADIENT: 4 MMHG
ECHO MV PRESSURE HALF TIME (PHT): 102.8 MS
ECHO MV VTI: 25.2 CM
ECHO PV MAX VELOCITY: 1 M/S
ECHO PV MEAN GRADIENT: 2 MMHG
ECHO PV MEAN VELOCITY: 0.7 M/S
ECHO PV PEAK GRADIENT: 4 MMHG
ECHO PV VTI: 19.7 CM
ECHO PVEIN A DURATION: 133.8 MS
ECHO PVEIN A VELOCITY: 0.2 M/S
ECHO PVEIN PEAK D VELOCITY: 0.3 M/S
ECHO PVEIN PEAK S VELOCITY: 0.5 M/S
ECHO PVEIN S/D RATIO: 1.7
ECHO RV INTERNAL DIMENSION: 3.8 CM
ECHO RV TAPSE: 2.9 CM (ref 1.7–?)

## 2024-08-14 PROCEDURE — 93306 TTE W/DOPPLER COMPLETE: CPT | Performed by: INTERNAL MEDICINE

## 2024-08-15 NOTE — RESULT ENCOUNTER NOTE
Ejection fraction largely unchanged still about 40-45%.  The important thing is that he is feeling well and staying active.  Encouraged him to continue the same.    He does have some thickening of the heart muscle.  Would be reasonable to check for a condition called amyloidosis.  Please arrange a pyrophosphate scan.    Continue same medications.  Follow-up as scheduled.

## 2024-08-16 ENCOUNTER — TELEPHONE (OUTPATIENT)
Dept: CARDIOLOGY CLINIC | Age: 79
End: 2024-08-16

## 2024-08-16 DIAGNOSIS — E85.9 AMYLOIDOSIS, UNSPECIFIED TYPE (HCC): Primary | ICD-10-CM

## 2024-08-16 NOTE — TELEPHONE ENCOUNTER
----- Message from Dr. Flaquito Rodriguez MD sent at 8/15/2024  5:56 PM EDT -----  Ejection fraction largely unchanged still about 40-45%.  The important thing is that he is feeling well and staying active.  Encouraged him to continue the same.    He does have some thickening of the heart muscle.  Would be reasonable to check for a condition called amyloidosis.  Please arrange a pyrophosphate scan.    Continue same medications.  Follow-up as scheduled.

## 2024-08-16 NOTE — TELEPHONE ENCOUNTER
Patient notified of EF and heart muscle thickening order placed for PYP scan per Dr. Rodriguez's recommendations. Patient will continue same medications and stay active per Dr. Rodriguez.

## 2024-09-03 ENCOUNTER — TELEPHONE (OUTPATIENT)
Dept: CARDIOLOGY CLINIC | Age: 79
End: 2024-09-03

## 2024-09-17 ENCOUNTER — HOSPITAL ENCOUNTER (OUTPATIENT)
Dept: OTHER | Age: 79
Setting detail: THERAPIES SERIES
Discharge: HOME | End: 2024-09-17
Payer: MEDICARE

## 2024-09-17 VITALS
BODY MASS INDEX: 30.34 KG/M2 | RESPIRATION RATE: 18 BRPM | DIASTOLIC BLOOD PRESSURE: 74 MMHG | OXYGEN SATURATION: 95 % | SYSTOLIC BLOOD PRESSURE: 132 MMHG | WEIGHT: 230 LBS | HEART RATE: 57 BPM

## 2024-09-17 LAB
ANION GAP SERPL CALCULATED.3IONS-SCNC: 10 MMOL/L (ref 7–16)
BNP SERPL-MCNC: 195 PG/ML (ref 0–450)
BUN SERPL-MCNC: 20 MG/DL (ref 6–23)
CALCIUM SERPL-MCNC: 9.1 MG/DL (ref 8.6–10.2)
CHLORIDE SERPL-SCNC: 104 MMOL/L (ref 98–107)
CO2 SERPL-SCNC: 22 MMOL/L (ref 22–29)
CREAT SERPL-MCNC: 1.2 MG/DL (ref 0.7–1.2)
GFR, ESTIMATED: 60 ML/MIN/1.73M2
GLUCOSE SERPL-MCNC: 105 MG/DL (ref 74–99)
POTASSIUM SERPL-SCNC: 4.2 MMOL/L (ref 3.5–5)
SODIUM SERPL-SCNC: 136 MMOL/L (ref 132–146)

## 2024-09-17 PROCEDURE — 83880 ASSAY OF NATRIURETIC PEPTIDE: CPT

## 2024-09-17 PROCEDURE — 36415 COLL VENOUS BLD VENIPUNCTURE: CPT

## 2024-09-17 PROCEDURE — 99214 OFFICE O/P EST MOD 30 MIN: CPT

## 2024-09-17 PROCEDURE — 80048 BASIC METABOLIC PNL TOTAL CA: CPT

## 2024-09-17 ASSESSMENT — PATIENT HEALTH QUESTIONNAIRE - PHQ9
SUM OF ALL RESPONSES TO PHQ QUESTIONS 1-9: 0
2. FEELING DOWN, DEPRESSED OR HOPELESS: NOT AT ALL
SUM OF ALL RESPONSES TO PHQ QUESTIONS 1-9: 0
1. LITTLE INTEREST OR PLEASURE IN DOING THINGS: NOT AT ALL
SUM OF ALL RESPONSES TO PHQ QUESTIONS 1-9: 0
SUM OF ALL RESPONSES TO PHQ9 QUESTIONS 1 & 2: 0
SUM OF ALL RESPONSES TO PHQ QUESTIONS 1-9: 0

## 2024-11-06 RX ORDER — APIXABAN 5 MG/1
5 TABLET, FILM COATED ORAL 2 TIMES DAILY
Qty: 180 TABLET | Refills: 3 | Status: SHIPPED | OUTPATIENT
Start: 2024-11-06

## 2024-11-11 RX ORDER — SACUBITRIL AND VALSARTAN 24; 26 MG/1; MG/1
1 TABLET, FILM COATED ORAL 2 TIMES DAILY
Qty: 180 TABLET | Refills: 3 | Status: SHIPPED
Start: 2024-11-11 | End: 2024-11-14 | Stop reason: SDUPTHER

## 2024-11-14 RX ORDER — SACUBITRIL AND VALSARTAN 24; 26 MG/1; MG/1
1 TABLET, FILM COATED ORAL 2 TIMES DAILY
Qty: 180 TABLET | Refills: 3 | Status: SHIPPED | OUTPATIENT
Start: 2024-11-14

## 2024-11-25 ENCOUNTER — TELEPHONE (OUTPATIENT)
Dept: OTHER | Age: 79
End: 2024-11-25

## 2024-11-25 ENCOUNTER — HOSPITAL ENCOUNTER (OUTPATIENT)
Dept: OTHER | Age: 79
Setting detail: THERAPIES SERIES
Discharge: HOME | End: 2024-11-25
Payer: MEDICARE

## 2024-11-25 ENCOUNTER — TELEPHONE (OUTPATIENT)
Dept: CARDIOLOGY CLINIC | Age: 79
End: 2024-11-25

## 2024-11-25 VITALS
RESPIRATION RATE: 18 BRPM | BODY MASS INDEX: 30.87 KG/M2 | OXYGEN SATURATION: 95 % | DIASTOLIC BLOOD PRESSURE: 82 MMHG | WEIGHT: 234 LBS | SYSTOLIC BLOOD PRESSURE: 142 MMHG | HEART RATE: 60 BPM

## 2024-11-25 LAB
ANION GAP SERPL CALCULATED.3IONS-SCNC: 7 MMOL/L (ref 7–16)
BNP SERPL-MCNC: 191 PG/ML (ref 0–450)
BUN SERPL-MCNC: 20 MG/DL (ref 6–23)
CALCIUM SERPL-MCNC: 9.4 MG/DL (ref 8.6–10.2)
CHLORIDE SERPL-SCNC: 105 MMOL/L (ref 98–107)
CO2 SERPL-SCNC: 28 MMOL/L (ref 22–29)
CREAT SERPL-MCNC: 1.3 MG/DL (ref 0.7–1.2)
GFR, ESTIMATED: 55 ML/MIN/1.73M2
GLUCOSE SERPL-MCNC: 78 MG/DL (ref 74–99)
POTASSIUM SERPL-SCNC: 5.4 MMOL/L (ref 3.5–5)
SODIUM SERPL-SCNC: 140 MMOL/L (ref 132–146)

## 2024-11-25 PROCEDURE — 99214 OFFICE O/P EST MOD 30 MIN: CPT

## 2024-11-25 PROCEDURE — 83880 ASSAY OF NATRIURETIC PEPTIDE: CPT

## 2024-11-25 PROCEDURE — 36415 COLL VENOUS BLD VENIPUNCTURE: CPT

## 2024-11-25 PROCEDURE — 80048 BASIC METABOLIC PNL TOTAL CA: CPT

## 2024-11-25 RX ORDER — DOCUSATE SODIUM 100 MG/1
100 CAPSULE, LIQUID FILLED ORAL PRN
COMMUNITY

## 2024-11-25 NOTE — PROGRESS NOTES
Congestive Heart Failure Clinic   [facility]    Referring Provider: Dr. Rodriguez  Primary Care Physician: Neri Ford MD   Cardiologist: Dr. Rodriguez  Nephrologist: Dr. Lima    HISTORY OF PRESENT ILLNESS:     Neri Fields is a 79 y.o. 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    In the CHF clinic today he denies any adverse symptoms except:  [] Dizziness or lightheadedness   [] Syncope or near syncope  [] Recent Fall  [] Shortness of breath at rest   [x] Dyspnea with exertion  [] Decline in functional capacity (unable to perform activities they had previously been able to do)  [] Fatigue   [] Orthopnea  [] PND  [] Nocturnal cough  [] Hemoptysis  [] Chest pain, pressure, or discomfort  [] Palpitations  [] Abdominal distention  [] Abdominal bloating  [] Early satiety  [] Blood in stool   [] Diarrhea  [] Constipation  [] Nausea/Vomiting  [] Decreased urinary response to oral diuretic   [] Scrotal swelling   [x] Lower extremity edema trace  [] Used PRN doses of oral diuretic   [] Weight gain    Wt Readings from Last 3 Encounters:   11/25/24 106.1 kg (234 lb)   09/17/24 104.3 kg (230 lb)   08/13/24 105.7 kg (233 lb)     SOCIAL HISTORY:  [x] Denies tobacco, alcohol or illicit drug abuse  [] Tobacco use:  [] ETOH use:  [] Illicit drug use:      MEDICATIONS:    No Known Allergies  Prior to Visit Medications    Medication Sig Taking? Authorizing Provider   docusate sodium (COLACE) 100 MG capsule Take 1 capsule by mouth as needed for Constipation Yes Provider, MD Joyce   sacubitril-valsartan (ENTRESTO) 24-26 MG per tablet Take 1 tablet by mouth 2 times daily Yes Flaquito Rodriguez MD   ELIQUIS 5 MG TABS tablet TAKE 1 TABLET TWICE A DAY Yes Flaquito Rodriguez MD   amiodarone (CORDARONE) 200 MG tablet

## 2024-11-25 NOTE — TELEPHONE ENCOUNTER
Patient notified of lab results and is going to call Dr. Lima's office today for further instructions per Dr. Rodriguez's recommendations.

## 2024-11-25 NOTE — TELEPHONE ENCOUNTER
Spoke with Jelly in Dr. Lima's office and she will speak with Dr. Lima for further recommendations.

## 2024-11-25 NOTE — RESULT ENCOUNTER NOTE
Potassium mildly elevated on CHF clinic labs, please forward to Dr. Lima and have patient follow-up with Dr. Lima for further instructions

## 2024-11-25 NOTE — TELEPHONE ENCOUNTER
Arturo Sorensen at Dr. Rodriguez office labs were faxed to Dr. Lima;s office. K+ 5.4 today at the CHF Clinic.

## 2024-11-25 NOTE — TELEPHONE ENCOUNTER
----- Message from Dr. Flaquito Rodriguez MD sent at 11/25/2024 12:20 PM EST -----  Potassium mildly elevated on CHF clinic labs, please forward to Dr. Lima and have patient follow-up with Dr. Lima for further instructions

## 2024-12-05 ENCOUNTER — OFFICE VISIT (OUTPATIENT)
Dept: CARDIOLOGY CLINIC | Age: 79
End: 2024-12-05
Payer: MEDICARE

## 2024-12-05 VITALS
OXYGEN SATURATION: 96 % | SYSTOLIC BLOOD PRESSURE: 138 MMHG | RESPIRATION RATE: 18 BRPM | HEART RATE: 54 BPM | BODY MASS INDEX: 31.79 KG/M2 | WEIGHT: 234.7 LBS | TEMPERATURE: 96.9 F | DIASTOLIC BLOOD PRESSURE: 82 MMHG | HEIGHT: 72 IN

## 2024-12-05 DIAGNOSIS — I44.4 LAFB (LEFT ANTERIOR FASCICULAR BLOCK): ICD-10-CM

## 2024-12-05 DIAGNOSIS — Z79.899 LONG TERM CURRENT USE OF AMIODARONE: ICD-10-CM

## 2024-12-05 DIAGNOSIS — E87.5 HYPERKALEMIA: ICD-10-CM

## 2024-12-05 DIAGNOSIS — I50.42 CHRONIC COMBINED SYSTOLIC AND DIASTOLIC CONGESTIVE HEART FAILURE (HCC): ICD-10-CM

## 2024-12-05 DIAGNOSIS — Z79.01 ON APIXABAN THERAPY: ICD-10-CM

## 2024-12-05 DIAGNOSIS — I42.8 NICM (NONISCHEMIC CARDIOMYOPATHY) (HCC): ICD-10-CM

## 2024-12-05 DIAGNOSIS — I71.21 ANEURYSM OF ASCENDING AORTA WITHOUT RUPTURE (HCC): ICD-10-CM

## 2024-12-05 DIAGNOSIS — I48.0 PAF (PAROXYSMAL ATRIAL FIBRILLATION) (HCC): Primary | ICD-10-CM

## 2024-12-05 DIAGNOSIS — N18.9 CHRONIC KIDNEY DISEASE, UNSPECIFIED CKD STAGE: ICD-10-CM

## 2024-12-05 PROBLEM — I48.91 ATRIAL FIBRILLATION WITH RAPID VENTRICULAR RESPONSE (HCC): Status: RESOLVED | Noted: 2022-04-06 | Resolved: 2024-12-05

## 2024-12-05 PROCEDURE — 1123F ACP DISCUSS/DSCN MKR DOCD: CPT | Performed by: INTERNAL MEDICINE

## 2024-12-05 PROCEDURE — 1159F MED LIST DOCD IN RCRD: CPT | Performed by: INTERNAL MEDICINE

## 2024-12-05 PROCEDURE — 93000 ELECTROCARDIOGRAM COMPLETE: CPT | Performed by: INTERNAL MEDICINE

## 2024-12-05 PROCEDURE — 99214 OFFICE O/P EST MOD 30 MIN: CPT | Performed by: INTERNAL MEDICINE

## 2024-12-05 PROCEDURE — 3075F SYST BP GE 130 - 139MM HG: CPT | Performed by: INTERNAL MEDICINE

## 2024-12-05 PROCEDURE — 3079F DIAST BP 80-89 MM HG: CPT | Performed by: INTERNAL MEDICINE

## 2024-12-05 RX ORDER — AMIODARONE HYDROCHLORIDE 200 MG/1
100 TABLET ORAL DAILY
Qty: 90 TABLET | Refills: 3
Start: 2024-12-05

## 2024-12-05 NOTE — PROGRESS NOTES
OUTPATIENT CARDIOLOGY FOLLOW-UP    Name: Neri Fields    Age: 79 y.o.    Primary Care Physician: Neri Ford MD    Date of Service: 12/5/2024    Chief Complaint:   Chief Complaint   Patient presents with    Follow-up     6 month ov        Interim History:   Here for follow-up regarding atrial fibrillation and LV dysfunction.  Seen in initial outpatient consultation in 4/1/2022 with new A. fib.  Was scheduled for MERT cardioversion outpatient, but on 4/6/2022 presented to the ER and was admitted, treated for CHF and underwent MERT cardioversion.  EF was severely reduced at 30%.  GDMT has been uptitrated and has been maintaining sinus rhythm.  Most recent EF 4/2023 shows improvement to 40-45%.      In 6/2023, he developed recurrent atrial fibrillation in 8/2023 but then spontaneously converted to sinus.  We initiated oral amiodarone.  Subsequently had worsening kidney function and elevated potassium so spironolactone was discontinued, and we stopped sacubitril/valsartan eventually resumed at a lower dose which he has been tolerating.    Overall feeling well.  Going to the gym almost daily, using an exercise bike and other equipment.  Denies chest pain shortness of breath palpitations.  Does note some fatigue and has to take naps, thinks it is related to the amiodarone.    Review of Systems:   Negative except as described above    Past Medical History:  Past Medical History:   Diagnosis Date    Acute heart failure (HCC)     Arthritis     Environmental allergies     grass mold trees    Hypertension     Renal cell carcinoma (HCC) 07/01/2014    s/p right nephrectomy       Past Surgical History:  Past Surgical History:   Procedure Laterality Date    COLONOSCOPY      DIAGNOSTIC CARDIAC CATH LAB PROCEDURE      HERNIA REPAIR      JOINT REPLACEMENT      beto hips    KNEE CARTILAGE SURGERY  years ago    right    PILONIDAL CYST EXCISION      TESTICLE REMOVAL  1995    TOTAL NEPHRECTOMY  7/22/14    right radical nephrectomy

## 2024-12-06 ENCOUNTER — TELEPHONE (OUTPATIENT)
Dept: CARDIOLOGY CLINIC | Age: 79
End: 2024-12-06

## 2024-12-06 DIAGNOSIS — I50.42 CHRONIC COMBINED SYSTOLIC AND DIASTOLIC CONGESTIVE HEART FAILURE (HCC): ICD-10-CM

## 2024-12-06 DIAGNOSIS — I48.0 PAF (PAROXYSMAL ATRIAL FIBRILLATION) (HCC): Primary | ICD-10-CM

## 2024-12-06 DIAGNOSIS — N18.9 CHRONIC KIDNEY DISEASE, UNSPECIFIED CKD STAGE: ICD-10-CM

## 2024-12-06 NOTE — TELEPHONE ENCOUNTER
----- Message from Dr. Flaquito Rodriguez MD sent at 12/5/2024  4:53 PM EST -----  Patient will be getting blood work with Dr. Lima next week, can we have him get LFTs and TSH/free T4 when he goes for those labs.

## 2024-12-12 ENCOUNTER — HOSPITAL ENCOUNTER (OUTPATIENT)
Dept: ULTRASOUND IMAGING | Age: 79
Discharge: HOME | End: 2024-12-14
Attending: UROLOGY
Payer: MEDICARE

## 2024-12-12 DIAGNOSIS — C64.1 MALIGNANT NEOPLASM OF RIGHT KIDNEY, EXCEPT RENAL PELVIS (HCC): ICD-10-CM

## 2024-12-12 DIAGNOSIS — N40.1 BENIGN PROSTATIC HYPERPLASIA WITH LOWER URINARY TRACT SYMPTOMS, SYMPTOM DETAILS UNSPECIFIED: ICD-10-CM

## 2024-12-12 PROCEDURE — 76770 US EXAM ABDO BACK WALL COMP: CPT

## 2024-12-13 ENCOUNTER — TELEPHONE (OUTPATIENT)
Dept: CARDIOLOGY CLINIC | Age: 79
End: 2024-12-13

## 2025-01-29 ENCOUNTER — HOSPITAL ENCOUNTER (OUTPATIENT)
Age: 80
Discharge: HOME | End: 2025-01-29
Payer: MEDICARE

## 2025-01-29 LAB
ANION GAP SERPL CALCULATED.3IONS-SCNC: 11 MMOL/L (ref 7–16)
BUN SERPL-MCNC: 20 MG/DL (ref 6–23)
CALCIUM SERPL-MCNC: 9 MG/DL (ref 8.6–10.2)
CHLORIDE SERPL-SCNC: 105 MMOL/L (ref 98–107)
CO2 SERPL-SCNC: 25 MMOL/L (ref 22–29)
CREAT SERPL-MCNC: 1.2 MG/DL (ref 0.7–1.2)
GFR, ESTIMATED: 61 ML/MIN/1.73M2
GLUCOSE SERPL-MCNC: 73 MG/DL (ref 74–99)
POTASSIUM SERPL-SCNC: 4.3 MMOL/L (ref 3.5–5)
SODIUM SERPL-SCNC: 141 MMOL/L (ref 132–146)

## 2025-01-29 PROCEDURE — 36415 COLL VENOUS BLD VENIPUNCTURE: CPT

## 2025-01-29 PROCEDURE — 80048 BASIC METABOLIC PNL TOTAL CA: CPT

## 2025-03-03 ENCOUNTER — HOSPITAL ENCOUNTER (OUTPATIENT)
Dept: OTHER | Age: 80
Setting detail: THERAPIES SERIES
Discharge: HOME | End: 2025-03-03
Payer: MEDICARE

## 2025-03-03 VITALS
SYSTOLIC BLOOD PRESSURE: 138 MMHG | DIASTOLIC BLOOD PRESSURE: 78 MMHG | HEART RATE: 55 BPM | WEIGHT: 231 LBS | OXYGEN SATURATION: 96 % | RESPIRATION RATE: 18 BRPM | BODY MASS INDEX: 31.33 KG/M2

## 2025-03-03 LAB
ANION GAP SERPL CALCULATED.3IONS-SCNC: 7 MMOL/L (ref 7–16)
BNP SERPL-MCNC: 212 PG/ML (ref 0–450)
BUN SERPL-MCNC: 18 MG/DL (ref 6–23)
CALCIUM SERPL-MCNC: 9 MG/DL (ref 8.6–10.2)
CHLORIDE SERPL-SCNC: 102 MMOL/L (ref 98–107)
CO2 SERPL-SCNC: 29 MMOL/L (ref 22–29)
CREAT SERPL-MCNC: 1.3 MG/DL (ref 0.7–1.2)
GFR, ESTIMATED: 55 ML/MIN/1.73M2
GLUCOSE SERPL-MCNC: 91 MG/DL (ref 74–99)
POTASSIUM SERPL-SCNC: 3.5 MMOL/L (ref 3.5–5)
SODIUM SERPL-SCNC: 138 MMOL/L (ref 132–146)

## 2025-03-03 PROCEDURE — 36415 COLL VENOUS BLD VENIPUNCTURE: CPT

## 2025-03-03 PROCEDURE — 99214 OFFICE O/P EST MOD 30 MIN: CPT

## 2025-03-03 PROCEDURE — 80048 BASIC METABOLIC PNL TOTAL CA: CPT

## 2025-03-03 PROCEDURE — 83880 ASSAY OF NATRIURETIC PEPTIDE: CPT

## 2025-03-03 ASSESSMENT — PATIENT HEALTH QUESTIONNAIRE - PHQ9
1. LITTLE INTEREST OR PLEASURE IN DOING THINGS: NOT AT ALL
SUM OF ALL RESPONSES TO PHQ QUESTIONS 1-9: 0
SUM OF ALL RESPONSES TO PHQ QUESTIONS 1-9: 0
2. FEELING DOWN, DEPRESSED OR HOPELESS: NOT AT ALL
SUM OF ALL RESPONSES TO PHQ QUESTIONS 1-9: 0
SUM OF ALL RESPONSES TO PHQ QUESTIONS 1-9: 0

## 2025-03-03 NOTE — PROGRESS NOTES
tablet by mouth 2 times daily (with meals) with meals Yes Flaquito Rodriguez MD   dapagliflozin (FARXIGA) 10 MG tablet Take 1 tablet by mouth every morning Yes Flaquito Rodriguez MD   atorvastatin (LIPITOR) 20 MG tablet Take 1 tablet by mouth daily Yes Flaquito Rodriguez MD   doxazosin (CARDURA) 2 MG tablet Take 1 tablet by mouth nightly Yes Joyce Tello MD   amLODIPine (NORVASC) 2.5 MG tablet Take 1 tablet by mouth daily Yes Joyce Tello MD   allopurinol (ZYLOPRIM) 300 MG tablet Take 1 tablet by mouth daily Yes Joyce Tello MD   acetaminophen (TYLENOL) 500 MG tablet Take 1 tablet by mouth every 6 hours as needed for Pain Yes Joyce Tello MD   furosemide (LASIX) 20 MG tablet Take 1 tablet by mouth daily Yes Flaquito Rodriguez MD   sertraline (ZOLOFT) 25 MG tablet Take 1 tablet by mouth every morning Yes Joyce Tello MD   LORazepam (ATIVAN) 1 MG tablet Take 0.5-1 tablets by mouth every 12 hours as needed for Anxiety. Yes Joyce Tello MD   meloxicam (MOBIC) 15 MG tablet Take 0.5 tablets by mouth in the morning and 0.5 tablets in the evening. Yes Joyce Tello MD        GUIDELINE DIRECTED MEDICAL THERAPY for HFrEF/HFmrEF:  ARNI/ACE I/ARB: (1a indication)  Entresto 24/26 mg BID  Hydralazine/Nitrates (1a in symptomatic AA population, 2b if unable to tolerate ACE/ARB/ARNI)  No  Beta blocker: (1a indication)  Carvedilol (Coreg)  Aldosterone antagonist: (1a indication)  No  SGLT2i: (1a indication)  Farxiga 10 mg daily    If Channel inhibitor (2a indication if HR >70 on maximally tolerated beta blocker)  No  Cardiac glycoside (2b indication for symptomatic HFrEF)  No  Soluble Guanylate Cyclase (sGC) Stimulator (2b indication LVEF <45% with recent HFH, IV diuretics or elevated BNP)  No  Potassium binders (2b indication for hyperkalemia while taking RAASi)  No    HEART FAILURE FOCUSED PHYSICAL EXAMINATION:    Vitals:    03/03/25 0803   BP: 138/78   Pulse: 55   Resp:

## 2025-03-17 DIAGNOSIS — I42.8 NICM (NONISCHEMIC CARDIOMYOPATHY) (HCC): ICD-10-CM

## 2025-03-17 DIAGNOSIS — I50.42 CHRONIC COMBINED SYSTOLIC AND DIASTOLIC CONGESTIVE HEART FAILURE (HCC): ICD-10-CM

## 2025-03-17 RX ORDER — DAPAGLIFLOZIN 10 MG/1
10 TABLET, FILM COATED ORAL EVERY MORNING
Qty: 90 TABLET | Refills: 3 | Status: SHIPPED | OUTPATIENT
Start: 2025-03-17

## 2025-03-24 RX ORDER — CARVEDILOL 25 MG/1
25 TABLET ORAL 2 TIMES DAILY WITH MEALS
Qty: 180 TABLET | Refills: 3 | Status: SHIPPED | OUTPATIENT
Start: 2025-03-24

## 2025-05-09 RX ORDER — SACUBITRIL AND VALSARTAN 24; 26 MG/1; MG/1
1 TABLET, FILM COATED ORAL 2 TIMES DAILY
Qty: 180 TABLET | Refills: 3 | Status: SHIPPED | OUTPATIENT
Start: 2025-05-09

## 2025-05-28 ENCOUNTER — HOSPITAL ENCOUNTER (OUTPATIENT)
Dept: OTHER | Age: 80
Setting detail: THERAPIES SERIES
Discharge: HOME | End: 2025-05-28
Payer: MEDICARE

## 2025-05-28 VITALS
BODY MASS INDEX: 29.84 KG/M2 | RESPIRATION RATE: 16 BRPM | DIASTOLIC BLOOD PRESSURE: 80 MMHG | HEART RATE: 68 BPM | WEIGHT: 220 LBS | OXYGEN SATURATION: 97 % | SYSTOLIC BLOOD PRESSURE: 132 MMHG

## 2025-05-28 LAB
ANION GAP SERPL CALCULATED.3IONS-SCNC: 13 MMOL/L (ref 7–16)
BNP SERPL-MCNC: 249 PG/ML (ref 0–450)
BUN SERPL-MCNC: 27 MG/DL (ref 6–23)
CALCIUM SERPL-MCNC: 9.6 MG/DL (ref 8.6–10.2)
CHLORIDE SERPL-SCNC: 104 MMOL/L (ref 98–107)
CO2 SERPL-SCNC: 19 MMOL/L (ref 22–29)
CREAT SERPL-MCNC: 1.1 MG/DL (ref 0.7–1.2)
GFR, ESTIMATED: 69 ML/MIN/1.73M2
GLUCOSE SERPL-MCNC: 131 MG/DL (ref 74–99)
POTASSIUM SERPL-SCNC: 4.6 MMOL/L (ref 3.5–5)
SODIUM SERPL-SCNC: 136 MMOL/L (ref 132–146)

## 2025-05-28 PROCEDURE — 99214 OFFICE O/P EST MOD 30 MIN: CPT

## 2025-05-28 PROCEDURE — 80048 BASIC METABOLIC PNL TOTAL CA: CPT

## 2025-05-28 PROCEDURE — 36415 COLL VENOUS BLD VENIPUNCTURE: CPT

## 2025-05-28 PROCEDURE — 83880 ASSAY OF NATRIURETIC PEPTIDE: CPT

## 2025-05-28 NOTE — PROGRESS NOTES
Congestive Heart Failure Clinic   [facility]    Referring Provider: Dr. Rodriguez  Primary Care Physician: Neri Ford MD   Cardiologist: Dr. Rodriguez  Nephrologist: Dr. Lima    HISTORY OF PRESENT ILLNESS:    Neri Fields is a 80 y.o. 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    In the CHF clinic today he denies any adverse symptoms except:  [] Dizziness or lightheadedness   [] Syncope or near syncope  [] Recent Fall  [] Shortness of breath at rest   [x] Dyspnea with exertion-recovers with rest  [] Decline in functional capacity (unable to perform activities they had previously been able to do)  [] Fatigue   [] Orthopnea  [] PND  [] Nocturnal cough  [] Hemoptysis  [] Chest pain, pressure, or discomfort  [] Palpitations  [] Abdominal distention  [] Abdominal bloating  [] Early satiety  [] Blood in stool   [] Diarrhea  [] Constipation  [] Nausea/Vomiting  [] Decreased urinary response to oral diuretic   [] Scrotal swelling   [] Lower extremity edema trace  [] Used PRN doses of oral diuretic   [] Weight gain    Wt Readings from Last 3 Encounters:   05/28/25 99.8 kg (220 lb)   03/03/25 104.8 kg (231 lb)   12/05/24 106.5 kg (234 lb 11.2 oz)     SOCIAL HISTORY:  [x] Denies tobacco, alcohol or illicit drug abuse  [] Tobacco use:  [] ETOH use:  [] Illicit drug use:      MEDICATIONS:    No Known Allergies  Prior to Visit Medications    Medication Sig Taking? Authorizing Provider   sacubitril-valsartan (ENTRESTO) 24-26 MG per tablet Take 1 tablet by mouth 2 times daily Yes Flaquito Rodriguez MD   carvedilol (COREG) 25 MG tablet Take 1 tablet by mouth 2 times daily (with meals) with meals Yes Neri Anguiano MD   dapagliflozin (FARXIGA) 10 MG tablet Take 1 tablet by mouth every morning Yes Flaquito Rodriguez

## 2025-06-12 ENCOUNTER — OFFICE VISIT (OUTPATIENT)
Dept: CARDIOLOGY CLINIC | Age: 80
End: 2025-06-12
Payer: MEDICARE

## 2025-06-12 VITALS
WEIGHT: 225.1 LBS | RESPIRATION RATE: 18 BRPM | OXYGEN SATURATION: 95 % | TEMPERATURE: 97.3 F | HEART RATE: 68 BPM | BODY MASS INDEX: 30.49 KG/M2 | DIASTOLIC BLOOD PRESSURE: 62 MMHG | SYSTOLIC BLOOD PRESSURE: 138 MMHG | HEIGHT: 72 IN

## 2025-06-12 DIAGNOSIS — I48.0 PAF (PAROXYSMAL ATRIAL FIBRILLATION) (HCC): Chronic | ICD-10-CM

## 2025-06-12 DIAGNOSIS — I42.8 NICM (NONISCHEMIC CARDIOMYOPATHY) (HCC): Chronic | ICD-10-CM

## 2025-06-12 DIAGNOSIS — I10 PRIMARY HYPERTENSION: ICD-10-CM

## 2025-06-12 DIAGNOSIS — I71.21 ANEURYSM OF ASCENDING AORTA WITHOUT RUPTURE: ICD-10-CM

## 2025-06-12 DIAGNOSIS — Z79.899 LONG TERM CURRENT USE OF AMIODARONE: Chronic | ICD-10-CM

## 2025-06-12 DIAGNOSIS — I50.42 CHRONIC COMBINED SYSTOLIC AND DIASTOLIC CONGESTIVE HEART FAILURE (HCC): Primary | Chronic | ICD-10-CM

## 2025-06-12 PROCEDURE — 3078F DIAST BP <80 MM HG: CPT | Performed by: INTERNAL MEDICINE

## 2025-06-12 PROCEDURE — 1159F MED LIST DOCD IN RCRD: CPT | Performed by: INTERNAL MEDICINE

## 2025-06-12 PROCEDURE — 93000 ELECTROCARDIOGRAM COMPLETE: CPT | Performed by: INTERNAL MEDICINE

## 2025-06-12 PROCEDURE — 1123F ACP DISCUSS/DSCN MKR DOCD: CPT | Performed by: INTERNAL MEDICINE

## 2025-06-12 PROCEDURE — 99214 OFFICE O/P EST MOD 30 MIN: CPT | Performed by: INTERNAL MEDICINE

## 2025-06-12 PROCEDURE — 3075F SYST BP GE 130 - 139MM HG: CPT | Performed by: INTERNAL MEDICINE

## 2025-06-12 NOTE — PROGRESS NOTES
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, will repeat and if stable would abandon further surveillance given age  Continue apixaban for stroke risk reduction; if creatinine consistently 1.5 or greater needs dose adjusted apixaban  If truly with significant LVH, consider evaluation for amyloidosis or other infiltrative cardiac -I ordered a PYP study which was turned down by insurance company for unknown reasons  Recommend discontinuation of meloxicam  Continue to follow with nephrology  Continue exercise regimen  Aggressive risk factor modification  Follow-up in 6 months or sooner if need arises    The patient's current medication list, allergies, problem list and results of all previously ordered testing were reviewed at today's visit.    Cash Rodriguez MD, [facility]

## 2025-06-24 ENCOUNTER — HOSPITAL ENCOUNTER (OUTPATIENT)
Age: 80
Discharge: HOME | End: 2025-06-24
Payer: MEDICARE

## 2025-06-24 DIAGNOSIS — I10 PRIMARY HYPERTENSION: ICD-10-CM

## 2025-06-24 LAB
ALBUMIN SERPL-MCNC: 4.3 G/DL (ref 3.5–5.2)
ALP SERPL-CCNC: 107 U/L (ref 40–129)
ALT SERPL-CCNC: 28 U/L (ref 0–50)
ANION GAP SERPL CALCULATED.3IONS-SCNC: 9 MMOL/L (ref 7–16)
AST SERPL-CCNC: 24 U/L (ref 0–50)
BILIRUB SERPL-MCNC: 0.7 MG/DL (ref 0–1.2)
BUN SERPL-MCNC: 21 MG/DL (ref 8–23)
CALCIUM SERPL-MCNC: 9.3 MG/DL (ref 8.8–10.2)
CHLORIDE SERPL-SCNC: 104 MMOL/L (ref 98–107)
CO2 SERPL-SCNC: 27 MMOL/L (ref 22–29)
CREAT SERPL-MCNC: 1.2 MG/DL (ref 0.7–1.2)
GFR, ESTIMATED: 58 ML/MIN/1.73M2
GLUCOSE SERPL-MCNC: 98 MG/DL (ref 74–99)
POTASSIUM SERPL-SCNC: 4.2 MMOL/L (ref 3.5–5.1)
PROT SERPL-MCNC: 6.8 G/DL (ref 6.4–8.3)
SODIUM SERPL-SCNC: 140 MMOL/L (ref 136–145)
TSH SERPL DL<=0.05 MIU/L-ACNC: 0.03 UIU/ML (ref 0.27–4.2)

## 2025-06-24 PROCEDURE — 80053 COMPREHEN METABOLIC PANEL: CPT

## 2025-06-24 PROCEDURE — 36415 COLL VENOUS BLD VENIPUNCTURE: CPT

## 2025-06-24 PROCEDURE — 84443 ASSAY THYROID STIM HORMONE: CPT

## 2025-06-27 ENCOUNTER — RESULTS FOLLOW-UP (OUTPATIENT)
Dept: CARDIOLOGY CLINIC | Age: 80
End: 2025-06-27

## 2025-06-27 DIAGNOSIS — I50.42 CHRONIC COMBINED SYSTOLIC AND DIASTOLIC CONGESTIVE HEART FAILURE (HCC): Primary | ICD-10-CM

## 2025-06-27 NOTE — RESULT ENCOUNTER NOTE
Let us know if not feeling well or ER if any significant issues.  If lab can't add on tests, please order along with a repeat TSH.

## 2025-06-30 LAB — T4 FREE SERPL-MCNC: 1.9 NG/DL (ref 0.9–1.7)

## 2025-07-01 DIAGNOSIS — E05.90 HYPERTHYROIDISM: Primary | ICD-10-CM

## 2025-07-01 LAB — T3FREE SERPL-MCNC: 2.06 PG/ML (ref 2–4.4)

## 2025-07-10 ENCOUNTER — HOSPITAL ENCOUNTER (OUTPATIENT)
Dept: CT IMAGING | Age: 80
Discharge: HOME | End: 2025-07-12
Attending: INTERNAL MEDICINE
Payer: MEDICARE

## 2025-07-10 DIAGNOSIS — I71.21 ANEURYSM OF ASCENDING AORTA WITHOUT RUPTURE: ICD-10-CM

## 2025-07-10 PROCEDURE — 71250 CT THORAX DX C-: CPT

## 2025-07-21 ENCOUNTER — TELEPHONE (OUTPATIENT)
Dept: CARDIOLOGY CLINIC | Age: 80
End: 2025-07-21

## 2025-07-21 NOTE — TELEPHONE ENCOUNTER
Pt called in to [facility] a 6 mo f/u in Dec, there are no available appts. Please, advise. Neri can be reached at [phone].